# Patient Record
Sex: FEMALE | Race: BLACK OR AFRICAN AMERICAN | NOT HISPANIC OR LATINO | Employment: OTHER | URBAN - METROPOLITAN AREA
[De-identification: names, ages, dates, MRNs, and addresses within clinical notes are randomized per-mention and may not be internally consistent; named-entity substitution may affect disease eponyms.]

---

## 2017-01-06 ENCOUNTER — ANESTHESIA EVENT (OUTPATIENT)
Dept: SURGERY | Facility: HOSPITAL | Age: 82
DRG: 857 | End: 2017-01-06
Payer: MEDICARE

## 2017-01-06 ENCOUNTER — HOSPITAL ENCOUNTER (INPATIENT)
Facility: HOSPITAL | Age: 82
LOS: 3 days | Discharge: NURSING, REHAB OR LONG-TERM CARE | DRG: 857 | End: 2017-01-09
Attending: FAMILY MEDICINE | Admitting: FAMILY MEDICINE
Payer: MEDICARE

## 2017-01-06 DIAGNOSIS — S72.492A OTHER CLOSED FRACTURE OF DISTAL END OF LEFT FEMUR, INITIAL ENCOUNTER: ICD-10-CM

## 2017-01-06 DIAGNOSIS — T81.40XD POSTOPERATIVE INFECTION, SUBSEQUENT ENCOUNTER: ICD-10-CM

## 2017-01-06 DIAGNOSIS — I15.2 HYPERTENSION ASSOCIATED WITH DIABETES: ICD-10-CM

## 2017-01-06 DIAGNOSIS — T81.40XA POST OP INFECTION: Primary | ICD-10-CM

## 2017-01-06 DIAGNOSIS — N18.32 CHRONIC KIDNEY DISEASE (CKD) STAGE G3B/A1, MODERATELY DECREASED GLOMERULAR FILTRATION RATE (GFR) BETWEEN 30-44 ML/MIN/1.73 SQUARE METER AND ALBUMINURIA CREATININE RATIO LESS THAN 30 MG/G: ICD-10-CM

## 2017-01-06 DIAGNOSIS — T81.40XA POSTOPERATIVE INFECTION, INITIAL ENCOUNTER: ICD-10-CM

## 2017-01-06 DIAGNOSIS — I10 ESSENTIAL HYPERTENSION: ICD-10-CM

## 2017-01-06 DIAGNOSIS — I50.30 DIASTOLIC HEART FAILURE, UNSPECIFIED HEART FAILURE CHRONICITY: ICD-10-CM

## 2017-01-06 DIAGNOSIS — E11.59 HYPERTENSION ASSOCIATED WITH DIABETES: ICD-10-CM

## 2017-01-06 LAB
APTT BLDCRRT: 33.8 SEC
INR PPP: 1.1
PROTHROMBIN TIME: 11.7 SEC

## 2017-01-06 PROCEDURE — 85610 PROTHROMBIN TIME: CPT

## 2017-01-06 PROCEDURE — 11000001 HC ACUTE MED/SURG PRIVATE ROOM

## 2017-01-06 PROCEDURE — 85730 THROMBOPLASTIN TIME PARTIAL: CPT

## 2017-01-06 PROCEDURE — 25000003 PHARM REV CODE 250: Performed by: FAMILY MEDICINE

## 2017-01-06 RX ORDER — AMLODIPINE BESYLATE 5 MG/1
10 TABLET ORAL NIGHTLY
Status: DISCONTINUED | OUTPATIENT
Start: 2017-01-06 | End: 2017-01-09 | Stop reason: HOSPADM

## 2017-01-06 RX ORDER — IBUPROFEN 200 MG
24 TABLET ORAL
Status: DISCONTINUED | OUTPATIENT
Start: 2017-01-06 | End: 2017-01-09 | Stop reason: HOSPADM

## 2017-01-06 RX ORDER — PANTOPRAZOLE SODIUM 40 MG/1
40 TABLET, DELAYED RELEASE ORAL 2 TIMES DAILY
Status: DISCONTINUED | OUTPATIENT
Start: 2017-01-06 | End: 2017-01-09 | Stop reason: HOSPADM

## 2017-01-06 RX ORDER — CHOLECALCIFEROL (VITAMIN D3) 25 MCG
2000 TABLET ORAL DAILY
Status: DISCONTINUED | OUTPATIENT
Start: 2017-01-07 | End: 2017-01-09 | Stop reason: HOSPADM

## 2017-01-06 RX ORDER — MIRTAZAPINE 15 MG/1
15 TABLET, FILM COATED ORAL NIGHTLY
Status: DISCONTINUED | OUTPATIENT
Start: 2017-01-06 | End: 2017-01-09 | Stop reason: HOSPADM

## 2017-01-06 RX ORDER — ENOXAPARIN SODIUM 100 MG/ML
30 INJECTION SUBCUTANEOUS EVERY 24 HOURS
Status: DISCONTINUED | OUTPATIENT
Start: 2017-01-06 | End: 2017-01-09 | Stop reason: HOSPADM

## 2017-01-06 RX ORDER — FERROUS GLUCONATE 324(38)MG
325 TABLET ORAL
Status: DISCONTINUED | OUTPATIENT
Start: 2017-01-07 | End: 2017-01-09 | Stop reason: HOSPADM

## 2017-01-06 RX ORDER — ENOXAPARIN SODIUM 100 MG/ML
40 INJECTION SUBCUTANEOUS EVERY 24 HOURS
Status: DISCONTINUED | OUTPATIENT
Start: 2017-01-06 | End: 2017-01-06 | Stop reason: DRUGHIGH

## 2017-01-06 RX ORDER — ACETAMINOPHEN 325 MG/1
650 TABLET ORAL EVERY 8 HOURS PRN
Status: DISCONTINUED | OUTPATIENT
Start: 2017-01-06 | End: 2017-01-09 | Stop reason: HOSPADM

## 2017-01-06 RX ORDER — LUBIPROSTONE 24 UG/1
24 CAPSULE ORAL 2 TIMES DAILY WITH MEALS
Status: DISCONTINUED | OUTPATIENT
Start: 2017-01-06 | End: 2017-01-09 | Stop reason: HOSPADM

## 2017-01-06 RX ORDER — METOPROLOL TARTRATE 50 MG/1
50 TABLET ORAL 2 TIMES DAILY
Status: DISCONTINUED | OUTPATIENT
Start: 2017-01-06 | End: 2017-01-09 | Stop reason: HOSPADM

## 2017-01-06 RX ORDER — ALBUTEROL SULFATE 90 UG/1
2 AEROSOL, METERED RESPIRATORY (INHALATION) EVERY 4 HOURS PRN
Status: DISCONTINUED | OUTPATIENT
Start: 2017-01-06 | End: 2017-01-09 | Stop reason: HOSPADM

## 2017-01-06 RX ORDER — GLUCAGON 1 MG
1 KIT INJECTION
Status: DISCONTINUED | OUTPATIENT
Start: 2017-01-06 | End: 2017-01-09 | Stop reason: HOSPADM

## 2017-01-06 RX ORDER — IBUPROFEN 200 MG
16 TABLET ORAL
Status: DISCONTINUED | OUTPATIENT
Start: 2017-01-06 | End: 2017-01-09 | Stop reason: HOSPADM

## 2017-01-06 RX ORDER — ATORVASTATIN CALCIUM 40 MG/1
80 TABLET, FILM COATED ORAL DAILY
Status: DISCONTINUED | OUTPATIENT
Start: 2017-01-07 | End: 2017-01-09 | Stop reason: HOSPADM

## 2017-01-06 RX ORDER — CLOPIDOGREL BISULFATE 75 MG/1
75 TABLET ORAL DAILY
Status: DISCONTINUED | OUTPATIENT
Start: 2017-01-07 | End: 2017-01-09 | Stop reason: HOSPADM

## 2017-01-06 RX ORDER — SODIUM CHLORIDE 9 MG/ML
INJECTION, SOLUTION INTRAVENOUS CONTINUOUS
Status: ACTIVE | OUTPATIENT
Start: 2017-01-07 | End: 2017-01-07

## 2017-01-06 RX ORDER — AMOXICILLIN 250 MG
1 CAPSULE ORAL DAILY
Status: DISCONTINUED | OUTPATIENT
Start: 2017-01-07 | End: 2017-01-09 | Stop reason: HOSPADM

## 2017-01-06 RX ORDER — BENAZEPRIL HYDROCHLORIDE 20 MG/1
20 TABLET ORAL DAILY
Status: DISCONTINUED | OUTPATIENT
Start: 2017-01-07 | End: 2017-01-06

## 2017-01-06 RX ADMIN — PANTOPRAZOLE SODIUM 40 MG: 40 TABLET, DELAYED RELEASE ORAL at 08:01

## 2017-01-06 RX ADMIN — METOPROLOL TARTRATE 50 MG: 50 TABLET ORAL at 08:01

## 2017-01-06 RX ADMIN — MIRTAZAPINE 15 MG: 15 TABLET, FILM COATED ORAL at 08:01

## 2017-01-06 RX ADMIN — ACETAMINOPHEN 650 MG: 325 TABLET ORAL at 05:01

## 2017-01-06 NOTE — PROGRESS NOTES
01/06/17 1656   Vital Signs   Temp 98.6 °F (37 °C)   Temp src Oral   Pulse 69   Heart Rate Source Monitor   Resp 16   SpO2 99 %   O2 Device (Oxygen Therapy) room air   /67   BP Location Left arm   BP Method Automatic   Patient Position Lying       Patient is AAOx3, NAD. No complaints of nausea or vomiting.  Patient complains of pain in left leg.  Doctor was called to place orders.  Family is at the bedside.  Will continue to monitor.

## 2017-01-06 NOTE — PROGRESS NOTES
Estimated Creatinine Clearance: 27.1 mL/min (based on Cr of 1.6).  Lovenox 40 mg q24h renal dose adjusted to  Lovenox 30 mg q24h

## 2017-01-06 NOTE — H&P
[facility]  History & Physical    SUBJECTIVE:     Chief Complaint/Reason for Admission: Medical Management for Irrigation/Debridement    History of Present Illness:    Ms. Rosas is a 81 year old female with HTN, GERD, arthritis, Grade I diastolic heart failure, CKD Grade 3B who was transferred from [facility] to the floor for medical management and irrigation/debridement of wound infection s/p ORIF. Patient originally fell on 12/07 and received ORIF on 12/9. She was transferred to [place] on 12/12 and was subsequently found to have drainage, odor and erytehmia consistent with infection, was sent to [facility] on 12/26 and had an irrigation/debridement done at that time. Patient was found to have Staph, started on Vanc for six weeks and transferred to [facility] for long term antibiotics. After Ortho consult and evaluation, it was decided to admit the patient to the floor for washout/debridement in the morning. Patient reports pain in her left leg that is diffuse, non-radiating, constant and throbbing. It is somewhat relieved with Norco. Patient denies f/chills. No N/v/abdominal pain. Denies CP/SOB. No other complaints at this time.     Sees Dr. Dubois at the end of every month, but missed this past month due to LTAC placement.     PTA Medications   Medication Sig    albuterol 90 mcg/actuation inhaler Inhale 2 puffs into the lungs every 4 (four) hours as needed for Wheezing.    amlodipine (NORVASC) 10 MG tablet Take 10 mg by mouth every evening.    ascorbic acid, vitamin C, (VITAMIN C) 500 MG tablet Take 500 mg by mouth once daily.    atorvastatin (LIPITOR) 80 MG tablet Take 80 mg by mouth once daily.    benazepril (LOTENSIN) 20 MG tablet Take 20 mg by mouth once daily.    bisacodyl (DULCOLAX) 10 mg Supp Place 1 suppository (10 mg total) rectally daily as needed (Until bowel movement if patient has no bowel movement for 2 days).    clopidogrel (PLAVIX) 75 mg tablet Take 75 mg by mouth once daily.     ferrous gluconate (FERGON) 325 MG Tab Take 325 mg by mouth daily with breakfast.    hydrocodone-acetaminophen 5-325mg (NORCO) 5-325 mg per tablet Take 2 tablets by mouth every 4 (four) hours as needed.    lubiprostone (AMITIZA) 24 MCG Cap Take 24 mcg by mouth 2 (two) times daily with meals.    magnesium 200 mg Tab Take 400 mg by mouth 2 (two) times daily.    metoprolol tartrate (LOPRESSOR) 50 MG tablet Take 50 mg by mouth 2 (two) times daily.    mirtazapine (REMERON) 15 MG tablet Take 15 mg by mouth every evening.    pantoprazole (PROTONIX) 40 MG tablet Take 40 mg by mouth 2 (two) times daily.    prednisoLONE acetate (PRED FORTE) 1 % DrpS 1 drop 4 (four) times daily.    senna-docusate 8.6-50 mg (PERICOLACE) 8.6-50 mg per tablet Take 1 tablet by mouth once daily.    simethicone 180 mg Cap Take 180 mg by mouth 4 (four) times daily as needed (gas pain).    VANCOMYCIN HCL (VANCOMYCIN 1 G/250 ML D5W, READY TO MIX SYSTEM,) Inject 258.4 mLs (1,033.6 mg total) into the vein once daily.    vitamin D 1000 units Tab Take 370 mg by mouth once daily.       Review of patient's allergies indicates:   Allergen Reactions    Celebrex [celecoxib] Other (See Comments)     Pain down her spine       Past Medical History   Diagnosis Date    Arthritis     Elevated cholesterol     GERD (gastroesophageal reflux disease)     Hypertension      Past Surgical History   Procedure Laterality Date    Hysterectomy      Hernia repair      Blood clot removal       History reviewed. No pertinent family history.  Social History   Substance Use Topics    Smoking status: Never Smoker    Smokeless tobacco: None    Alcohol use No        Review of Systems:  Constitutional: no fever or chills  Eyes: no visual changes  ENT: no nasal congestion or sore throat  Respiratory: no cough or shortness of breath  Cardiovascular: no chest pain or palpitations  Gastrointestinal: no nausea or vomiting, no abdominal pain or change in bowel  habits  Genitourinary: no hematuria or dysuria  Musculoskeletal: no arthralgias or myalgias  Neurological: no seizures or tremors  Behavioral/Psych: no auditory or visual hallucinations  Endocrine: no heat or cold intolerance    OBJECTIVE:     Vital Signs (Most Recent):  Temp: 98.6 °F (37 °C) (01/06/17 1656)  Pulse: 69 (01/06/17 1656)  Resp: 16 (01/06/17 1656)  BP: 115/67 (01/06/17 1656)  SpO2: 99 % (01/06/17 1656)    Physical Exam:  General: well developed, well nourished  Eyes: conjunctivae/corneas clear. PERRL..  HENT: Head:normocephalic, atraumatic. Nose: Nares normal. Septum midline. Mucosa normal. No drainage or sinus tenderness., no discharge. Throat: lips, mucosa, and tongue normal; teeth and gums normal and no throat erythema.  Neck: supple, symmetrical, trachea midline, no JVD and thyroid not enlarged, symmetric, no tenderness/mass/nodules  Lungs:  clear to auscultation bilaterally and normal respiratory effort  Cardiovascular: Heart: regular rate and rhythm, S1, S2 normal, no murmur, click, rub or gallop. Chest Wall: no tenderness. Extremities: no cyanosis or edema, or clubbing. Pulses: 2+ and symmetric.  Abdomen/Rectal: Abdomen: soft, non-tender non-distented; bowel sounds normal; no masses,  no organomegaly. Rectal: normal tone, no masses or tenderness  Skin: Skin color, texture, turgor normal. No rashes or lesions  Musculoskeletal:no clubbing, cyanosis; Some pitting edema in bilateral LE's. Fixation in place over left lower extremity with dressings in place.   Neurologic: Normal strength and tone. No focal numbness or weakness  Psych/Behavioral:  Normal.    Laboratory:  CBC:   Recent Labs  Lab 01/06/17  0934   WBC 11.10   RBC 3.80*   HGB 11.1@RBC.*   HCT 36.0*   *   MCV 95   MCH 29.2   MCHC 30.8*     BMP:   Recent Labs  Lab 01/05/17  0650   GLU 72      K 4.7   *   CO2 25   BUN 45*   CREATININE 1.6*   CALCIUM 10.2   MG 1.7     CMP:   Recent Labs  Lab 01/05/17  0650   GLU 72   CALCIUM  10.2      K 4.7   CO2 25   *   BUN 45*   CREATININE 1.6*       Diagnostic Results:  No new imaging.     ASSESSMENT/PLAN:     Ms. Rosas is a 81 year old female with PMH HTN well-controlled on medications, grade I diastolic HF, CKD Grade 3B, GERD, and arthritis who presents for medical management for irrigation/debridement of wound infection s/p ORIF:    Wound Infection S/P ORIF  -NPO at midnight, hold Lovenox until after surgery  -Irrigation/debridement tomorrow around 12    HTN  -Continue Amlodipine.  -Hold Benazepril for SUMI risk during surgery.     Grade I Diastolic HF  -No systolic dysfunction, only minor diastolic failure  -Revised Cardiac Risk Index between 1-6% for major cardiac event based on patients history    CKD Grade 3B  -GFR has been stable around mid 30's placing her in grade 3B  -Cr<2.0, does not add to revised Cardiac Risk Index    GERD  -Continue PPI    Arthritis  -Tylenol PRN, patient non-ambulatory currently.     DVT Ppx: holding Lovenox for surgery tomorrow; not a candidate for SCD's due to fixation device.Start Lovenox after surgery.     Plan: monitor clinical stability and basic labs. F/U ortho recs s/p irrigation/debridement tomorrow.    1/6/2017 6:11 PM Kade Mejia M.D.

## 2017-01-06 NOTE — H&P
CC: L distal femur surgical site infection     HPI:  81yF w/ L distal femur fx s/p ORIF 12/9 complicated by a persistent surgical sit infection.  Underwent I&D on 12/26/16 followed by I&D, HWR, L knee spanning ex fix placement and abx bead placement on 12/27/16. Intraoperative cultures positive for MRSA.  She was placed on vancomycin and discharged to LTAC.  Over the past two days, she has experienced increasing purulent drainage from proximal extent of L thigh incision. Afebrile and vitals stable in LTAC.    PMH:        Past Medical History   Diagnosis Date    Arthritis      Elevated cholesterol      GERD (gastroesophageal reflux disease)      Hypertension           PSH:    has a past surgical history that includes Hysterectomy; Hernia repair; and blood clot removal.     SOCIAL:    reports that she has never smoked. She does not have any smokeless tobacco history on file. She reports that she does not drink alcohol or use illicit drugs.     MEDS:   No current facility-administered medications on file prior to encounter.              Current Outpatient Prescriptions on File Prior to Encounter   Medication Sig Dispense Refill    albuterol 90 mcg/actuation inhaler Inhale 2 puffs into the lungs every 4 (four) hours as needed for Wheezing.        amlodipine (NORVASC) 10 MG tablet Take 10 mg by mouth every evening.        atorvastatin (LIPITOR) 80 MG tablet Take 80 mg by mouth once daily.        benazepril (LOTENSIN) 20 MG tablet Take 20 mg by mouth once daily.        clopidogrel (PLAVIX) 75 mg tablet Take 75 mg by mouth once daily.        hydrocodone-acetaminophen 5-325mg (NORCO) 5-325 mg per tablet Take 2 tablets by mouth every 4 (four) hours as needed. 50 tablet 0    lubiprostone (AMITIZA) 24 MCG Cap Take 24 mcg by mouth 2 (two) times daily with meals.        metoprolol tartrate (LOPRESSOR) 50 MG tablet Take 50 mg by mouth 2 (two) times daily.        mirtazapine (REMERON) 15 MG tablet Take 15 mg by mouth  every evening.        pantoprazole (PROTONIX) 40 MG tablet Take 40 mg by mouth 2 (two) times daily.        simethicone 180 mg Cap Take 180 mg by mouth 4 (four) times daily as needed (gas pain).             ALLERGY:         Allergies as of 12/26/2016 - Braxton as Reviewed 12/26/2016   Allergen Reaction Noted    Celebrex [celecoxib] Other (See Comments) 12/08/2016         Temp:  [98.6 °F (37 °C)] 98.6 °F (37 °C)  Pulse:  [69] 69  Resp:  [16] 16  BP: (115)/(67) 115/67    PE:  NAD, A+Ox3  RRR  No increased WOB    LLE:  No significant erythema  Sutures in place  Purulent drainage saturating dressings  Purulent drainage expressible from proximal extent of wound  Ex fix in place, pin sites clean  Motor: + ehl, fhl, ta, gs  SILT: t/s/s/sp/dp  Bcr, wwp, 2+ DP    WBC 11.1  H/H 11.1/36  ESR >120  Cr 1.6  BUN 45  Vanc 19.3    Impression:  81yF w/ L distal femur surgical site infection    Plan:  - NPO/IVF at midnight  - To OR in morning, I&D, abx bead exchange  - Admitted to medicine, appreciate assistance with medical management  - Cont. IV vanc  - pain control  - NWB LLE     I agree with findings outlined by the resident. We will proceed with I&D, bead exchange today.

## 2017-01-06 NOTE — ANESTHESIA PREPROCEDURE EVALUATION
01/06/2017  Adilia Rosas is a 81 y.o., female for repeat I&D femur.    PRIOR ANES  2016-12-27 i/d leg and placement of abx spacer.  2016-12-26 I&D_Leg GA sevo&N2O NAAC   - IND: ent 75, prop 90; -90; MAINT: total phenylephrine 350   - A/W: easy mask, ETT 7.0 Mac#3,  Grade I, Atraumatic, 1 attempt  2019-12-09 ORIF_L_Femur GA Iso NAAC   - IND: fent 100, etom10+prop50, ->90; MAINT:  total phenylephrine 600   - A/W: no mask vent; ETT 7.5 to 21cm, Smith#2, 1 attempt atraumatic Grade I    Patient Active Problem List   Diagnosis    Closed fracture of distal end of left femur    Postoperative infection    Hypertension    Staphylococcus aureus infection    Hypomagnesemia    Hypophosphatemia    Anemia    Post op infection       Past Medical History   Diagnosis Date    Arthritis     Elevated cholesterol     GERD (gastroesophageal reflux disease)     Hypertension      *  CAD, MI 2006, medical management    Past Surgical History   Procedure Laterality Date    Hysterectomy      Hernia repair      Blood clot removal       Review of patient's allergies indicates:   Allergen Reactions    Celebrex [celecoxib] Other (See Comments)     Pain down her spine     ANES-RELATED MAR 2016-12-26  amlodipine pantoprazole pip-tazo 4.5g  benazepril  metoprolol  atorastatin  enoxaparin to srart 12/27      [facility] Anesthesia Evaluation    I have reviewed the Patient Summary Reports.    I have reviewed the Nursing Notes.   I have reviewed the Medications.     Review of Systems  Anesthesia Hx:  No problems with previous Anesthesia  Denies Family Hx of Anesthesia complications.   Denies Personal Hx of Anesthesia complications.   Social:  Non-Smoker, No Alcohol Use    Hematology/Oncology:     Oncology Normal   Hematology Comments: 2u prbc 1/5/16   EENT/Dental:EENT/Dental Normal   Cardiovascular:   Exercise tolerance:  good Past MI  ECG has been reviewed. Able to perform ADL and house work without chest pain ,dyspnea or dizziness   Pulmonary:   Asthma mild Reports no asthma attacks in several month-years   Renal/:   Chronic Renal Disease, CRI Baseline Cr. 1.7   Hepatic/GI:   GERD    Musculoskeletal:   Arthritis     Neurological:  Neurology Normal    Endocrine:  Endocrine Normal    Dermatological:  Skin Normal    Psych:  Psychiatric Normal         Wt Readings from Last 3 Encounters:   01/06/17 75 kg (165 lb 5.5 oz)   12/26/16 68.9 kg (152 lb)   12/08/16 72.9 kg (160 lb 11.5 oz)     Temp Readings from Last 3 Encounters:   01/06/17 37 °C (98.6 °F) (Oral)   12/29/16 36.7 °C (98.1 °F) (Oral)   12/12/16 37 °C (98.6 °F) (Oral)     BP Readings from Last 3 Encounters:   01/06/17 115/67   12/29/16 136/84   12/12/16 113/71     Pulse Readings from Last 3 Encounters:   01/06/17 69   12/29/16 81   12/12/16 98       Physical Exam  General:  Well nourished    Airway/Jaw/Neck:  Airway Findings: Mouth Opening: Normal Tongue: Normal  Mallampati: II  Improves to II with phonation.  TM Distance: Normal, at least 6 cm        Eyes/Ears/Nose:  EYES/EARS/NOSE FINDINGS: Normal   Dental:  Dental Findings:    Chest/Lungs:  Chest/Lungs Findings: Clear to auscultation, Normal Respiratory Rate     Heart/Vascular:  Heart Findings: Rate: Normal  Rhythm: Regular Rhythm  Heart Murmur  Systolic Heart Murmur Description: L Upper Sternal Border  Systolic Heart Murmur Grade: Grade III        Mental Status:  Mental Status Findings: Normal      Lab Results   Component Value Date    WBC 11.10 01/06/2017    HGB 11.1@RBC. (L) 01/06/2017    HCT 36.0 (L) 01/06/2017    MCV 95 01/06/2017     (H) 01/06/2017       Chemistry        Component Value Date/Time     01/05/2017 0650    K 4.7 01/05/2017 0650     (H) 01/05/2017 0650    CO2 25 01/05/2017 0650    BUN 45 (H) 01/05/2017 0650    CREATININE 1.6 (H) 01/05/2017 0650    GLU 72 01/05/2017 0650        Component  Value Date/Time    CALCIUM 10.2 01/05/2017 0650    ALKPHOS 94 12/29/2016 0557    AST 27 12/29/2016 0557    ALT <5 (L) 12/29/2016 0557    BILITOT 0.3 12/29/2016 0557        Lab Results   Component Value Date    ALBUMIN 1.6 (L) 12/29/2016     CXR 2016-12-07  Stable appearance of the chest a mild increase in interstitial lung markings.    EKG 2016-12-07  Sinus bradycardia with sinus arrhythmia  Possible Inferior infarct (cited on or before 11-MAY-2006)  Abnormal ECG  When compared with ECG of 19-SEP-2016 06:17,  Nonspecific T wave abnormality, improved in Anterior leads  Confirmed by Gus    ECHO 2016-09-19    1 - Mil d left atrial enlargement.     2 - Normal left ventricular systolic function (EF 55-60%).     3 - Normal right ventricular systolic function .     4 - The estimated PA systolic pressure is 38 mmHg.     5 - Trivial aortic regurgitation.     6 - Mild mitral regurgitation.     7 - Trivial tricuspid regurgitation.     8 - Grade 1 diastolic dysfunction .     Anesthesia Plan  Type of Anesthesia, risks & benefits discussed:  Anesthesia Type:  general  Patient's Preference:   Intra-op Monitoring Plan: arterial line and central line  Intra-op Monitoring Plan Comments:   Post Op Pain Control Plan:   Post Op Pain Control Plan Comments:   Induction:   IV  Beta Blocker:  Patient is on a Beta-Blocker and has received one dose within the past 24 hours (No further documentation required).       Informed Consent: Patient representative understands risks and agrees with Anesthesia plan.  Questions answered. Anesthesia consent signed with patient representative.  ASA Score: 3     Day of Surgery Review of History & Physical:            Ready For Surgery From Anesthesia Perspective.

## 2017-01-07 ENCOUNTER — ANESTHESIA (OUTPATIENT)
Dept: SURGERY | Facility: HOSPITAL | Age: 82
DRG: 857 | End: 2017-01-07
Payer: MEDICARE

## 2017-01-07 LAB
ALBUMIN SERPL BCP-MCNC: 1.7 G/DL
ALP SERPL-CCNC: 80 U/L
ALT SERPL W/O P-5'-P-CCNC: 10 U/L
ANION GAP SERPL CALC-SCNC: 5 MMOL/L
AST SERPL-CCNC: 26 U/L
BASOPHILS # BLD AUTO: 0.04 K/UL
BASOPHILS NFR BLD: 0.4 %
BILIRUB SERPL-MCNC: 0.4 MG/DL
BUN SERPL-MCNC: 37 MG/DL
CALCIUM SERPL-MCNC: 10.3 MG/DL
CHLORIDE SERPL-SCNC: 111 MMOL/L
CO2 SERPL-SCNC: 24 MMOL/L
CREAT SERPL-MCNC: 1.6 MG/DL
DIFFERENTIAL METHOD: ABNORMAL
EOSINOPHIL # BLD AUTO: 0.2 K/UL
EOSINOPHIL NFR BLD: 1.6 %
ERYTHROCYTE [DISTWIDTH] IN BLOOD BY AUTOMATED COUNT: 15.8 %
EST. GFR  (AFRICAN AMERICAN): 35 ML/MIN/1.73 M^2
EST. GFR  (NON AFRICAN AMERICAN): 30 ML/MIN/1.73 M^2
GLUCOSE SERPL-MCNC: 82 MG/DL
HCT VFR BLD AUTO: 32.7 %
HGB BLD-MCNC: 10.2 G/DL
LYMPHOCYTES # BLD AUTO: 1.4 K/UL
LYMPHOCYTES NFR BLD: 13.5 %
MAGNESIUM SERPL-MCNC: 1.8 MG/DL
MCH RBC QN AUTO: 29.4 PG
MCHC RBC AUTO-ENTMCNC: 31.2 %
MCV RBC AUTO: 94 FL
MONOCYTES # BLD AUTO: 0.7 K/UL
MONOCYTES NFR BLD: 7 %
NEUTROPHILS # BLD AUTO: 8.1 K/UL
NEUTROPHILS NFR BLD: 77.2 %
PHOSPHATE SERPL-MCNC: 3.1 MG/DL
PLATELET # BLD AUTO: 433 K/UL
PMV BLD AUTO: 9.8 FL
POCT GLUCOSE: 101 MG/DL (ref 70–110)
POTASSIUM SERPL-SCNC: 4.6 MMOL/L
PROT SERPL-MCNC: 5.7 G/DL
RBC # BLD AUTO: 3.47 M/UL
SODIUM SERPL-SCNC: 140 MMOL/L
VANCOMYCIN SERPL-MCNC: 25.9 UG/ML
WBC # BLD AUTO: 10.49 K/UL

## 2017-01-07 PROCEDURE — 25000003 PHARM REV CODE 250: Performed by: ANESTHESIOLOGY

## 2017-01-07 PROCEDURE — 3E0U029 INTRODUCTION OF OTHER ANTI-INFECTIVE INTO JOINTS, OPEN APPROACH: ICD-10-PCS | Performed by: ORTHOPAEDIC SURGERY

## 2017-01-07 PROCEDURE — 85025 COMPLETE CBC W/AUTO DIFF WBC: CPT

## 2017-01-07 PROCEDURE — 25000003 PHARM REV CODE 250: Performed by: STUDENT IN AN ORGANIZED HEALTH CARE EDUCATION/TRAINING PROGRAM

## 2017-01-07 PROCEDURE — 97161 PT EVAL LOW COMPLEX 20 MIN: CPT | Performed by: PHYSICAL THERAPIST

## 2017-01-07 PROCEDURE — 71000033 HC RECOVERY, INTIAL HOUR: Performed by: ORTHOPAEDIC SURGERY

## 2017-01-07 PROCEDURE — 97530 THERAPEUTIC ACTIVITIES: CPT | Performed by: PHYSICAL THERAPIST

## 2017-01-07 PROCEDURE — 11000001 HC ACUTE MED/SURG PRIVATE ROOM

## 2017-01-07 PROCEDURE — 80053 COMPREHEN METABOLIC PANEL: CPT

## 2017-01-07 PROCEDURE — 63600175 PHARM REV CODE 636 W HCPCS: Performed by: FAMILY MEDICINE

## 2017-01-07 PROCEDURE — 36000704 HC OR TIME LEV I 1ST 15 MIN: Performed by: ORTHOPAEDIC SURGERY

## 2017-01-07 PROCEDURE — 83735 ASSAY OF MAGNESIUM: CPT

## 2017-01-07 PROCEDURE — 63600175 PHARM REV CODE 636 W HCPCS: Performed by: STUDENT IN AN ORGANIZED HEALTH CARE EDUCATION/TRAINING PROGRAM

## 2017-01-07 PROCEDURE — 63600175 PHARM REV CODE 636 W HCPCS: Performed by: ANESTHESIOLOGY

## 2017-01-07 PROCEDURE — 36000705 HC OR TIME LEV I EA ADD 15 MIN: Performed by: ORTHOPAEDIC SURGERY

## 2017-01-07 PROCEDURE — 94761 N-INVAS EAR/PLS OXIMETRY MLT: CPT

## 2017-01-07 PROCEDURE — 25000003 PHARM REV CODE 250: Performed by: FAMILY MEDICINE

## 2017-01-07 PROCEDURE — 37000009 HC ANESTHESIA EA ADD 15 MINS: Performed by: ORTHOPAEDIC SURGERY

## 2017-01-07 PROCEDURE — G8980 MOBILITY D/C STATUS: HCPCS | Mod: CJ | Performed by: PHYSICAL THERAPIST

## 2017-01-07 PROCEDURE — G8979 MOBILITY GOAL STATUS: HCPCS | Mod: CK | Performed by: PHYSICAL THERAPIST

## 2017-01-07 PROCEDURE — 63600175 PHARM REV CODE 636 W HCPCS: Performed by: ORTHOPAEDIC SURGERY

## 2017-01-07 PROCEDURE — 37000008 HC ANESTHESIA 1ST 15 MINUTES: Performed by: ORTHOPAEDIC SURGERY

## 2017-01-07 PROCEDURE — C1713 ANCHOR/SCREW BN/BN,TIS/BN: HCPCS | Performed by: ORTHOPAEDIC SURGERY

## 2017-01-07 PROCEDURE — 0SPD08Z REMOVAL OF SPACER FROM LEFT KNEE JOINT, OPEN APPROACH: ICD-10-PCS | Performed by: ORTHOPAEDIC SURGERY

## 2017-01-07 PROCEDURE — 84100 ASSAY OF PHOSPHORUS: CPT

## 2017-01-07 PROCEDURE — 0JBM0ZZ EXCISION OF LEFT UPPER LEG SUBCUTANEOUS TISSUE AND FASCIA, OPEN APPROACH: ICD-10-PCS | Performed by: ORTHOPAEDIC SURGERY

## 2017-01-07 PROCEDURE — 80202 ASSAY OF VANCOMYCIN: CPT

## 2017-01-07 DEVICE — CEMENT BONE SIMPLE P INDIVID: Type: IMPLANTABLE DEVICE | Site: KNEE | Status: FUNCTIONAL

## 2017-01-07 RX ORDER — MEPERIDINE HYDROCHLORIDE 50 MG/ML
12.5 INJECTION INTRAMUSCULAR; INTRAVENOUS; SUBCUTANEOUS ONCE AS NEEDED
Status: ACTIVE | OUTPATIENT
Start: 2017-01-07 | End: 2017-01-07

## 2017-01-07 RX ORDER — HYDROMORPHONE HYDROCHLORIDE 2 MG/ML
0.2 INJECTION, SOLUTION INTRAMUSCULAR; INTRAVENOUS; SUBCUTANEOUS EVERY 5 MIN PRN
Status: DISCONTINUED | OUTPATIENT
Start: 2017-01-07 | End: 2017-01-09 | Stop reason: HOSPADM

## 2017-01-07 RX ORDER — OXYCODONE AND ACETAMINOPHEN 7.5; 325 MG/1; MG/1
1 TABLET ORAL EVERY 4 HOURS PRN
Status: DISCONTINUED | OUTPATIENT
Start: 2017-01-07 | End: 2017-01-09 | Stop reason: HOSPADM

## 2017-01-07 RX ORDER — FENTANYL CITRATE 50 UG/ML
INJECTION, SOLUTION INTRAMUSCULAR; INTRAVENOUS
Status: DISCONTINUED | OUTPATIENT
Start: 2017-01-07 | End: 2017-01-07

## 2017-01-07 RX ORDER — PROPOFOL 10 MG/ML
VIAL (ML) INTRAVENOUS
Status: DISCONTINUED | OUTPATIENT
Start: 2017-01-07 | End: 2017-01-07

## 2017-01-07 RX ORDER — HYDROCODONE BITARTRATE AND ACETAMINOPHEN 5; 325 MG/1; MG/1
1 TABLET ORAL
Status: DISCONTINUED | OUTPATIENT
Start: 2017-01-07 | End: 2017-01-09 | Stop reason: HOSPADM

## 2017-01-07 RX ORDER — OXYCODONE AND ACETAMINOPHEN 5; 325 MG/1; MG/1
1 TABLET ORAL EVERY 4 HOURS PRN
Status: DISCONTINUED | OUTPATIENT
Start: 2017-01-07 | End: 2017-01-09 | Stop reason: HOSPADM

## 2017-01-07 RX ORDER — DIPHENHYDRAMINE HYDROCHLORIDE 50 MG/ML
25 INJECTION INTRAMUSCULAR; INTRAVENOUS EVERY 6 HOURS PRN
Status: DISCONTINUED | OUTPATIENT
Start: 2017-01-07 | End: 2017-01-09 | Stop reason: HOSPADM

## 2017-01-07 RX ORDER — LIDOCAINE HYDROCHLORIDE 20 MG/ML
INJECTION, SOLUTION EPIDURAL; INFILTRATION; INTRACAUDAL; PERINEURAL
Status: DISCONTINUED | OUTPATIENT
Start: 2017-01-07 | End: 2017-01-07

## 2017-01-07 RX ORDER — MORPHINE SULFATE 2 MG/ML
4 INJECTION, SOLUTION INTRAMUSCULAR; INTRAVENOUS EVERY 4 HOURS PRN
Status: DISCONTINUED | OUTPATIENT
Start: 2017-01-07 | End: 2017-01-09 | Stop reason: HOSPADM

## 2017-01-07 RX ORDER — SUCCINYLCHOLINE CHLORIDE 20 MG/ML
INJECTION INTRAMUSCULAR; INTRAVENOUS
Status: DISCONTINUED | OUTPATIENT
Start: 2017-01-07 | End: 2017-01-07

## 2017-01-07 RX ORDER — SODIUM CHLORIDE 0.9 % (FLUSH) 0.9 %
3 SYRINGE (ML) INJECTION
Status: DISCONTINUED | OUTPATIENT
Start: 2017-01-07 | End: 2017-01-09 | Stop reason: HOSPADM

## 2017-01-07 RX ORDER — ONDANSETRON HYDROCHLORIDE 2 MG/ML
INJECTION, SOLUTION INTRAMUSCULAR; INTRAVENOUS
Status: DISCONTINUED | OUTPATIENT
Start: 2017-01-07 | End: 2017-01-07

## 2017-01-07 RX ORDER — SODIUM CHLORIDE 0.9 % (FLUSH) 0.9 %
3 SYRINGE (ML) INJECTION EVERY 8 HOURS
Status: DISCONTINUED | OUTPATIENT
Start: 2017-01-07 | End: 2017-01-09 | Stop reason: HOSPADM

## 2017-01-07 RX ORDER — VANCOMYCIN HYDROCHLORIDE 1 G/20ML
INJECTION, POWDER, LYOPHILIZED, FOR SOLUTION INTRAVENOUS
Status: DISCONTINUED | OUTPATIENT
Start: 2017-01-07 | End: 2017-01-07 | Stop reason: HOSPADM

## 2017-01-07 RX ORDER — ROCURONIUM BROMIDE 10 MG/ML
INJECTION, SOLUTION INTRAVENOUS
Status: DISCONTINUED | OUTPATIENT
Start: 2017-01-07 | End: 2017-01-07

## 2017-01-07 RX ORDER — GLYCOPYRROLATE 0.2 MG/ML
INJECTION INTRAMUSCULAR; INTRAVENOUS
Status: DISCONTINUED | OUTPATIENT
Start: 2017-01-07 | End: 2017-01-07

## 2017-01-07 RX ORDER — TOBRAMYCIN 1.2 G/30ML
INJECTION, POWDER, LYOPHILIZED, FOR SOLUTION INTRAVENOUS
Status: DISCONTINUED | OUTPATIENT
Start: 2017-01-07 | End: 2017-01-07 | Stop reason: HOSPADM

## 2017-01-07 RX ORDER — PHENYLEPHRINE HYDROCHLORIDE 10 MG/ML
INJECTION INTRAVENOUS
Status: DISCONTINUED | OUTPATIENT
Start: 2017-01-07 | End: 2017-01-07

## 2017-01-07 RX ADMIN — MORPHINE SULFATE 4 MG: 2 INJECTION, SOLUTION INTRAMUSCULAR; INTRAVENOUS at 03:01

## 2017-01-07 RX ADMIN — PANTOPRAZOLE SODIUM 40 MG: 40 TABLET, DELAYED RELEASE ORAL at 01:01

## 2017-01-07 RX ADMIN — FENTANYL CITRATE 25 MCG: 50 INJECTION, SOLUTION INTRAMUSCULAR; INTRAVENOUS at 10:01

## 2017-01-07 RX ADMIN — PANTOPRAZOLE SODIUM 40 MG: 40 TABLET, DELAYED RELEASE ORAL at 08:01

## 2017-01-07 RX ADMIN — FENTANYL CITRATE 50 MCG: 50 INJECTION, SOLUTION INTRAMUSCULAR; INTRAVENOUS at 09:01

## 2017-01-07 RX ADMIN — VANCOMYCIN HYDROCHLORIDE 1 G: 1 INJECTION, POWDER, LYOPHILIZED, FOR SOLUTION INTRAVENOUS at 09:01

## 2017-01-07 RX ADMIN — OXYCODONE AND ACETAMINOPHEN 1 TABLET: 7.5; 325 TABLET ORAL at 01:01

## 2017-01-07 RX ADMIN — HYDROMORPHONE HYDROCHLORIDE 0.2 MG: 2 INJECTION, SOLUTION INTRAMUSCULAR; INTRAVENOUS; SUBCUTANEOUS at 12:01

## 2017-01-07 RX ADMIN — MIRTAZAPINE 15 MG: 15 TABLET, FILM COATED ORAL at 08:01

## 2017-01-07 RX ADMIN — LIDOCAINE HYDROCHLORIDE 100 MG: 20 INJECTION, SOLUTION EPIDURAL; INFILTRATION; INTRACAUDAL; PERINEURAL at 09:01

## 2017-01-07 RX ADMIN — PROPOFOL 100 MG: 10 INJECTION, EMULSION INTRAVENOUS at 09:01

## 2017-01-07 RX ADMIN — PHENYLEPHRINE HYDROCHLORIDE 50 MCG: 10 INJECTION INTRAVENOUS at 10:01

## 2017-01-07 RX ADMIN — SODIUM CHLORIDE, PRESERVATIVE FREE 3 ML: 5 INJECTION INTRAVENOUS at 10:01

## 2017-01-07 RX ADMIN — PHENYLEPHRINE HYDROCHLORIDE 50 MCG: 10 INJECTION INTRAVENOUS at 11:01

## 2017-01-07 RX ADMIN — ATORVASTATIN CALCIUM 80 MG: 40 TABLET, FILM COATED ORAL at 01:01

## 2017-01-07 RX ADMIN — ONDANSETRON 4 MG: 2 INJECTION, SOLUTION INTRAMUSCULAR; INTRAVENOUS at 10:01

## 2017-01-07 RX ADMIN — STANDARDIZED SENNA CONCENTRATE AND DOCUSATE SODIUM 1 TABLET: 8.6; 5 TABLET, FILM COATED ORAL at 01:01

## 2017-01-07 RX ADMIN — ENOXAPARIN SODIUM 30 MG: 40 INJECTION, SOLUTION INTRAVENOUS; SUBCUTANEOUS at 01:01

## 2017-01-07 RX ADMIN — ROCURONIUM BROMIDE 10 MG: 10 INJECTION, SOLUTION INTRAVENOUS at 09:01

## 2017-01-07 RX ADMIN — FENTANYL CITRATE 25 MCG: 50 INJECTION, SOLUTION INTRAMUSCULAR; INTRAVENOUS at 11:01

## 2017-01-07 RX ADMIN — GLYCOPYRROLATE 0.8 MG: 0.2 INJECTION, SOLUTION INTRAMUSCULAR; INTRAVENOUS at 10:01

## 2017-01-07 RX ADMIN — SODIUM CHLORIDE, PRESERVATIVE FREE 3 ML: 5 INJECTION INTRAVENOUS at 02:01

## 2017-01-07 RX ADMIN — VITAMIN D, TAB 1000IU (100/BT) 2000 UNITS: 25 TAB at 01:01

## 2017-01-07 RX ADMIN — SUCCINYLCHOLINE CHLORIDE 100 MG: 20 INJECTION, SOLUTION INTRAMUSCULAR; INTRAVENOUS at 09:01

## 2017-01-07 RX ADMIN — METOPROLOL TARTRATE 50 MG: 50 TABLET ORAL at 01:01

## 2017-01-07 RX ADMIN — SODIUM CHLORIDE: 0.9 INJECTION, SOLUTION INTRAVENOUS at 12:01

## 2017-01-07 RX ADMIN — SODIUM CHLORIDE: 0.9 INJECTION, SOLUTION INTRAVENOUS at 09:01

## 2017-01-07 NOTE — ANESTHESIA RELEASE NOTE
"Anesthesia Release from PACU Note    Patient: Adilia Rosas    Procedure(s) Performed: Procedure(s) (LRB):  IRRIGATION AND DEBRIDEMENT LOWER EXTREMITY (Left)  PLACEMENT SPACER-ANTIBIOTIC (Left)    Anesthesia type: general    Post pain: Adequate analgesia    Post assessment: no apparent anesthetic complications, tolerated procedure well and no evidence of recall    Last Vitals:   Visit Vitals    /70 (BP Location: Left arm, Patient Position: Lying, BP Method: Automatic)    Pulse 71    Temp 36.4 °C (97.5 °F) (Oral)    Resp 16    Ht 5' 2" (1.575 m)    Wt 75 kg (165 lb 5.5 oz)    LMP  (LMP Unknown)    SpO2 97%    Breastfeeding No    BMI 30.24 kg/m2       Post vital signs: stable    Level of consciousness: awake, alert  and oriented    Nausea/Vomiting: no nausea/no vomiting    Complications: none    Airway Patency: patent    Respiratory: unassisted    Cardiovascular: stable and blood pressure at baseline    Hydration: euvolemic  "

## 2017-01-07 NOTE — PLAN OF CARE
"VSS. Denies pain or discomfort @ this time. "Ok to release to room", per Dr Cornelius. REport called to pt's nurse Bria Portillo RN, with time allotted for questions.  "

## 2017-01-07 NOTE — PLAN OF CARE
01/07/17 1637   Readmission Questionnaire   At the time of your discharge, did someone talk to you about what your health problems were? Yes   At the time of discharge, did someone talk to you about what to watch out for regarding worsening of your health problem? Yes   At the time of discharge, did someone talk to you about what to do if you experienced worsening of your health problem? Yes   At the time of discharge, did someone talk to you about when and where to follow up with a doctor after you left the hospital? Yes   What do you believe caused you to be sick enough to be re-admitted? post op wound infection   How often do you need to have someone help you when you read instructions, pamphlets, or other written material from your doctor or pharmacy? Often   Do you have problems taking your medications as prescribed? No   Do you have any problems affording any of  your prescribed medications? No   Do you have problems obtaining/receiving your medications? No   Does the patient have transportation to healthcare appointments? Yes   Lives With child(francy), adult   Living Arrangements house   Does the patient have family/friends to help with healtcare needs after discharge? yes   Does your caregiver provide all the help you need? Yes   Are you currently feeling confused? No   Are you currently having problems thinking? No   Are you currently having memory problems? No   Have you felt down, depressed, or hopeless? 0   Have you felt little interest or pleasure in doing things? 0   In the last 7 days, my sleep quality was: jordi Alvarez RN Transitional Navigator  [phone]

## 2017-01-07 NOTE — BRIEF OP NOTE
Brief Operative Note    Adilia Rossa    1/7/2016    Pre-op Diagnosis: L distal femur fracture complicated by surgical site infection       Post-op Diagnosis: L distal femur fracture complicated by surgical site infection    Procedure(s):   Irrigation and debridement with antibiotic bead exchange      Anesthesia: General    Surgeon(s):   MD Quentin Ruelas MD    Staff Attending:  Jose Antonio Franklin MD    Estimated Blood Loss: 25c           Drain: None            Total IV Fluids: 300 mL crystalloid           Specimens: None    Implants: 20 antibiotic impregnated cement beads    Complications: none    Findings: small quantity of purulent material distally, internal dehiscence           Disposition: awakened from anesthesia, extubated and taken to the recovery room in a stable condition, having suffered no apparent untoward event.           Condition: doing well without problems      Technique: See operative report    I agree with findings outlined by the resident.

## 2017-01-07 NOTE — TRANSFER OF CARE
"Anesthesia Transfer of Care Note    Patient: Adilia Rosas    Procedure(s) Performed: Procedure(s) (LRB):  IRRIGATION AND DEBRIDEMENT LOWER EXTREMITY (Left)  PLACEMENT SPACER-ANTIBIOTIC (Left)    Patient location: OPS    Anesthesia Type: general    Transport from OR: Transported from OR on 6-10 L/min O2 by face mask with adequate spontaneous ventilation    Post pain: adequate analgesia    Post assessment: no apparent anesthetic complications    Post vital signs: stable    Level of consciousness: awake, alert and oriented    Nausea/Vomiting: no nausea/vomiting    Complications: none          Last vitals:   Visit Vitals    /74    Pulse 82    Temp 36.6 °C (97.9 °F) (Oral)    Resp (!) 24    Ht 5' 2" (1.575 m)    Wt 75 kg (165 lb 5.5 oz)    LMP  (LMP Unknown)    SpO2 99%    Breastfeeding No    BMI 30.24 kg/m2     "

## 2017-01-07 NOTE — H&P
The patient has been examined and the H&P has been reviewed:  I concur with the findings and no changes have occurred since H&P was written.      Anesthesia/Surgery risks, benefits and alternative options discussed and understood by patient/family.    I agree with findings outlined by the resident.

## 2017-01-07 NOTE — OP NOTE
"1/7/2016     Orthopaedic Surgery Service      Attending Physician:   Jose Antonio Franklin MD      Assistants:   Quentin Kay MD      Pre-Op Diagnosis:   Wound Infection After ORIF Left Distal Femur Fracture     Post-Op Diagnosis:   Same      Procedure:   Irrigation and Debridement Left Thigh Wound  Antibiotic bead exchange     Anesthesia:   GETA      Implants:   1antibiotic bead strings - 20 beads     Estimated Blood Loss:   25cc     IVF:  300mL NS     Complications:   None      Indications:   The patient has a wound infection after ORIF of a distal femur fracture on 12/9/16. She had an irrigation and debridement 12/26, as well as an I&D with HWR, L knee spanning ex fix application and antibiotic bead placement. Over the past several days, increasing purulent drainage was noted from the wound despite IV abx therapy.  The patient has been indicated for repeat I&D of L thigh surgical site as well as antibiotic bead exchange. The risks, benefits, alternatives, were discussed with the patient and family who agreed to proceed.     Procedure Details:   The patient was taken to the operating room, identified as Adilia Rosas and the procedure verified. Following the successful induction of anesthesia the patient was placed supine on the standard OR table. Sutures were removed. The left limb was prepped and draped in the usual sterile fashion. A "time out" was held and the above information confirmed.    The incision was re-opened and the wound was explored. Internal dehiscence of the layered closure from the prior procedure was noted. The 2 antibiotic bead strings were identified and removed, all 23 antibiotic-impregnated cement beads were accounted for and removed.  A small quantity of purulent material was noted in the distal extent of the wound. There was a small about of necrotic-appearing tissue present in the wound.  No large collection of purulent fluid was identified. The lateral arthrotomy to the knee was noted and " explored, no purulent material in the knee joint was appreciated. A large cavitary defect was noted in the distal femoral fracture site.  The fracture site was thoroughly cleaned, no obvious infection of the bone was appreciable. Necrotic-appearing tissue was debrided from the site with the use of curettes and sharply with a 10 blade scalpel. The wound was then thoroughly irrigated with 3L of normal saline, including the L knee joint.  Antibiotic-impregnated beads were prepared on the back table using 3g of vancomycin as well as 3.6g of tobramycin.  20 beads were placed on a single PDS string which was then placed in the cleaned wound.    Next, layered closure of the wound was performed as follows: number 1 PDS closure of the IT band and capsular remnants, 2-0 PDS closure of the deep subcutanteous fat layer, 2-0 monocril closure for subcutaneous closure, and 2-0 nylon closure of the skin in interrupted, vertical mattress fashion.  The wound was dressed with xeroform, 4x4 gauze, ABD pads and metapore tape.  Pin sites were dressed with xeroform, 4x4 gauze, and kerlex.    Instrument, sponge, and needle counts were correct prior to wound closure and at the conclusion of the case.     Plan:  She will return to the floor to continue IV abx therapy. Wound will be careful monitored for increasing drainage.

## 2017-01-07 NOTE — PLAN OF CARE
Problem: Patient Care Overview  Goal: Plan of Care Review  Outcome: Ongoing (interventions implemented as appropriate)  No respiratory distress noted at this time. Will continue to monitor pt.

## 2017-01-07 NOTE — PLAN OF CARE
Problem: Patient Care Overview  Goal: Plan of Care Review  Outcome: Ongoing (interventions implemented as appropriate)  Patient is AAOx3, NAD noted, no complaints of nausea, vomiting, or SOB.  PRN pain medications given per MAR.  External fixator on left leg still in place.  Patient went for I&D today.  Family at the bedside.  PT at the bedside now working with patient.  Medications taken without difficulty.  NS infusing at 50cc/hr.  Bed is in lowest position, wheels locked, call light within reach.  Safety has been maintained.  Will continue to monitor.

## 2017-01-07 NOTE — ANESTHESIA POSTPROCEDURE EVALUATION
"Anesthesia Post Evaluation    Patient: Adilai Rosas    Procedure(s) Performed: Procedure(s) (LRB):  IRRIGATION AND DEBRIDEMENT LOWER EXTREMITY (Left)  PLACEMENT SPACER-ANTIBIOTIC (Left)    Final Anesthesia Type: general  Patient location during evaluation: PACU  Patient participation: Yes- Able to Participate  Level of consciousness: awake and alert  Post-procedure vital signs: reviewed and stable  Pain management: adequate  Airway patency: patent  PONV status at discharge: No PONV  Anesthetic complications: no      Cardiovascular status: hemodynamically stable  Respiratory status: unassisted  Hydration status: euvolemic  Follow-up not needed.        Visit Vitals    /70 (BP Location: Left arm, Patient Position: Lying, BP Method: Automatic)    Pulse 71    Temp 36.4 °C (97.5 °F) (Oral)    Resp 16    Ht 5' 2" (1.575 m)    Wt 75 kg (165 lb 5.5 oz)    LMP  (LMP Unknown)    SpO2 97%    Breastfeeding No    BMI 30.24 kg/m2       Pain/Kristie Score: Pain Assessment Performed: Yes (1/7/2017 12:25 PM)  Presence of Pain: denies (1/7/2017 12:25 PM)  Pain Rating Prior to Med Admin: 6 (1/7/2017  1:03 PM)  Kristie Score: 10 (1/7/2017 12:25 PM)      "

## 2017-01-07 NOTE — PROGRESS NOTES
Progress Note    Admit Date: 1/6/2017   LOS: 1 day     SUBJECTIVE:     NAEON. Patient currently in irrigation/debridement and abx bead exchange with ortho. Will follow-up with patient upon return.     Scheduled Meds:   amlodipine  10 mg Oral QHS    atorvastatin  80 mg Oral Daily    clopidogrel  75 mg Oral Daily    enoxaparin  30 mg Subcutaneous Daily    ferrous gluconate  324 mg Oral Daily with breakfast    lubiprostone  24 mcg Oral BID WM    metoprolol tartrate  50 mg Oral BID    mirtazapine  15 mg Oral QHS    pantoprazole  40 mg Oral BID    senna-docusate 8.6-50 mg  1 tablet Oral Daily    vancomycin 1 g in dextrose 5 % 250 mL IVPB (ready to mix system)  1 g Intravenous Daily    vitamin D  2,000 Units Oral Daily     Continuous Infusions:   sodium chloride 0.9% 50 mL/hr at 01/07/17 0045     PRN Meds:acetaminophen, albuterol, dextrose 50%, dextrose 50%, glucagon (human recombinant), glucose, glucose    Review of patient's allergies indicates:   Allergen Reactions    Celebrex [celecoxib] Other (See Comments)     Pain down her spine       OBJECTIVE:     Vital Signs (Most Recent)  Temp: 98.3 °F (36.8 °C) (01/07/17 0800)  Pulse: 65 (01/07/17 0800)  Resp: 16 (01/07/17 0800)  BP: 133/74 (01/07/17 0800)  SpO2: 95 % (01/07/17 0839)    Vital Signs Range (Last 24H):  Temp:  [97.6 °F (36.4 °C)-98.6 °F (37 °C)]   Pulse:  [62-83]   Resp:  [16-18]   BP: (111-139)/(61-76)   SpO2:  [95 %-99 %]     I & O (Last 24H):  Intake/Output Summary (Last 24 hours) at 01/07/17 0940  Last data filed at 01/07/17 0130   Gross per 24 hour   Intake                0 ml   Output              350 ml   Net             -350 ml     Physical Exam:  General: well developed, well nourished  Eyes: conjunctivae/corneas clear. PERRL..  HENT: Head:normocephalic, atraumatic. Nose: Nares normal. Septum midline. Mucosa normal. No drainage or sinus tenderness., no discharge. Throat: lips, mucosa, and tongue normal; teeth and gums normal and no throat  erythema.  Neck: supple, symmetrical, trachea midline, no JVD and thyroid not enlarged, symmetric, no tenderness/mass/nodules  Lungs: clear to auscultation bilaterally and normal respiratory effort  Cardiovascular: Heart: regular rate and rhythm, S1, S2 normal, no murmur, click, rub or gallop. Chest Wall: no tenderness. Extremities: no cyanosis or edema, or clubbing. Pulses: 2+ and symmetric.  Abdomen/Rectal: Abdomen: soft, non-tender non-distented; bowel sounds normal; no masses, no organomegaly. Rectal: normal tone, no masses or tenderness  Skin: Skin color, texture, turgor normal. No rashes or lesions  Musculoskeletal:no clubbing, cyanosis; Some pitting edema in bilateral LE's. Fixation in place over left lower extremity with dressings in place.   Neurologic: Normal strength and tone. No focal numbness or weakness  Psych/Behavioral: Normal.    Laboratory:  CBC:   Recent Labs  Lab 01/07/17  0602   WBC 10.49   RBC 3.47*   HGB 10.2*   HCT 32.7*   *   MCV 94   MCH 29.4   MCHC 31.2*     BMP:   Recent Labs  Lab 01/07/17  0602   GLU 82      K 4.6   *   CO2 24   BUN 37*   CREATININE 1.6*   CALCIUM 10.3   MG 1.8     CMP:   Recent Labs  Lab 01/07/17  0602   GLU 82   CALCIUM 10.3   ALBUMIN 1.7*   PROT 5.7*      K 4.6   CO2 24   *   BUN 37*   CREATININE 1.6*   ALKPHOS 80   ALT 10   AST 26   BILITOT 0.4     LFTs:   Recent Labs  Lab 01/07/17  0602   ALT 10   AST 26   ALKPHOS 80   BILITOT 0.4   PROT 5.7*   ALBUMIN 1.7*     Coagulation:   Recent Labs  Lab 01/06/17  1925   INR 1.1   APTT 33.8*         Diagnostic Results:  No new images.     ASSESSMENT/PLAN:     Ms. Rosas is a 81 year old female with PMH HTN well-controlled on medications, grade I diastolic HF, CKD Grade 3B, GERD, and arthritis who presents for medical management for irrigation/debridement of wound infection s/p ORIF:     Wound Infection S/P ORIF  -Irrigation/debridement with abx bead replacement currently.  -F/U with patient  afterward.      HTN  -Continue Amlodipine.  -Hold Benazepril for SUMI risk during surgery.      Grade I Diastolic HF  -No systolic dysfunction, only minor diastolic failure  -Revised Cardiac Risk Index between 1-6% for major cardiac event based on patients history     CKD Grade 3B  -GFR has been stable around mid 30's placing her in grade 3B  -Cr<2.0, does not add to revised Cardiac Risk Index     GERD  -Continue PPI     Arthritis  -Tylenol PRN, patient non-ambulatory currently.      DVT Ppx: holding Lovenox for surgery tomorrow; not a candidate for SCD's due to fixation device.Start Lovenox after surgery.      Plan: Labs stable, patient in surgery. F/U with patient s/p irrigation/debridement and abx bead replacement.     1/7/2017 9:48 AM Kade Mejia M.D.

## 2017-01-07 NOTE — PLAN OF CARE
Problem: Physical Therapy Goal  Goal: Physical Therapy Goal  1. Pt. Supine to sit with Mod I  2. Pt. Bed to/from w/c with NWB LLE with CG  3. Tolerate Sitting 1 1/2 hours  Outcome: Ongoing (interventions implemented as appropriate)  Pt would benefit from PT to improve functional mobility.

## 2017-01-07 NOTE — PLAN OF CARE
01/07/17 1631   Discharge Assessment   Assessment Type Discharge Planning Assessment   Assessment information obtained from? Medical Record   Prior to hospitilization cognitive status: Alert/Oriented   Prior to hospitalization functional status: Assistive Equipment   Current cognitive status: Unable to Assess  (pt out of rm for debridement of surgical wound)   Current Functional Status: Completely Dependent   Arrived From skilled nursing facility   Lives With child(francy), adult   Able to Return to Prior Arrangements unable to determine at this time (comments)   Is patient able to care for self after discharge? Unable to determine at this time (comments)   Does the patient have family/friends to help with healtcare needs after discharge? yes   How many people do you have in your home that can help with your care after discharge? 1   Patient's perception of discharge disposition skilled nursing facility   Readmission Within The Last 30 Days other (see comments)  (return from SNF with post op infection)   Patient currently being followed by outpatient case management? No   Patient currently receives home health services? No   Does the patient currently use HME? Yes   Patient currently receives private duty nursing? No   Patient currently receives any other outside agency services? No   Equipment Currently Used at Home walker, rolling;bedside commode   Do you have any problems affording any of your prescribed medications? No   Is the patient taking medications as prescribed? yes   Do you have any financial concerns preventing you from receiving the healthcare you need? No   Does the patient have transportation to healthcare appointments? Yes   Transportation Available family or friend will provide   On Dialysis? No   Does the patient receive services at the [facility]mad[facility]? No   Are there any open cases? No   Discharge Plan A Skilled Nursing Facility   Discharge Plan B Home Health   Patient/Family In Agreement With  Plan yes     Iqra Alvarez RN Transitional Navigator  [phone]

## 2017-01-07 NOTE — PLAN OF CARE
Problem: Patient Care Overview  Goal: Plan of Care Review  Outcome: Ongoing (interventions implemented as appropriate)  RA ST 97%; no distress noted.

## 2017-01-07 NOTE — PROGRESS NOTES
Interval:   NAEON.  Pain controlled.  NPO overnight.  Ready for surgery.  All questions addressed.    Vitals:  Temp:  [97.6 °F (36.4 °C)-98.6 °F (37 °C)] 97.9 °F (36.6 °C)  Pulse:  [62-83] 70  Resp:  [13-24] 13  BP: (109-139)/(60-76) 114/68    Scheduled Meds:    amlodipine  10 mg Oral QHS    atorvastatin  80 mg Oral Daily    clopidogrel  75 mg Oral Daily    enoxaparin  30 mg Subcutaneous Daily    ferrous gluconate  324 mg Oral Daily with breakfast    lubiprostone  24 mcg Oral BID WM    metoprolol tartrate  50 mg Oral BID    mirtazapine  15 mg Oral QHS    pantoprazole  40 mg Oral BID    senna-docusate 8.6-50 mg  1 tablet Oral Daily    sodium chloride 0.9%  3 mL Intravenous Q8H    vancomycin 1 g in dextrose 5 % 250 mL IVPB (ready to mix system)  1 g Intravenous Daily    vitamin D  2,000 Units Oral Daily     Continuous Infusions:    sodium chloride 0.9% 50 mL/hr at 01/07/17 0045     PRN Meds: acetaminophen, albuterol, dextrose 50%, dextrose 50%, diphenhydrAMINE, glucagon (human recombinant), glucose, glucose, hydrocodone-acetaminophen 5-325mg, HYDROmorphone, HYDROmorphone, meperidine, morphine, oxycodone-acetaminophen, oxycodone-acetaminophen, promethazine (PHENERGAN) IVPB, sodium chloride 0.9%    Diet: Diet Adult Regular    Trended Lab Data:    Recent Labs  Lab 01/02/17  0512 01/05/17  0650 01/06/17  0934 01/07/17  0602   WBC 10.16 9.89 11.10 10.49   HGB 8.1* 6.9* 11.1@RBC.* 10.2*   HCT 27.0* 22.7* 36.0* 32.7*    387* 363* 433*   MCV 94 95 95 94   RDW 16.6* 16.4* 16.2* 15.8*    140  --  140   K 5.4* 4.7  --  4.6   * 111*  --  111*   CO2 22* 25  --  24   BUN 51* 45*  --  37*   CREATININE 1.6* 1.6*  --  1.6*   GLU 87 72  --  82   PROT  --   --   --  5.7*   ALBUMIN  --   --   --  1.7*   BILITOT  --   --   --  0.4   AST  --   --   --  26   ALKPHOS  --   --   --  80   ALT  --   --   --  10       I/O last 3 completed shifts:  In: -   Out: 350 [Urine:350]    Exam:  NAD, A+Ox3  RRR  No  increased WOB    LLE:  Ex fix in place  Dressings, saturated with purulent fluid  Motor: + ehl, fhl, ta, gs  SILT: t/s/s/sp/dp  2+ DP/PT, bcr, wwp    Impression:  81yF with SSI after ORIF of L distal femur fracture.    Plan:  - To OR today for I&D, antibiotic bead exchange  - Pain control  - IV vanc  DVT/VTE prophylaxis: lonovex  Weight Bearing Status: NWB LLE  Dispo: continue inpatient care through weekend, will need to return to LTAC sometime next week

## 2017-01-07 NOTE — PROGRESS NOTES
[facility]  Post-op Note    Pain controlled.  Denies numbness/paresthesias.    Vitals:    01/07/17 1225   BP: 114/68   Pulse: 70   Resp: 13   Temp:      NAD, A+Ox3  RRR  No increased WOB    LLE:  Ex fix in place  Dressings c/d/i  Motor: + ehl, fhl, ta, gs  SILT: t/s/s/sp/dp  2+ DP/PT, bcr, wwp    A/P:  81yF with L distal femur fracture complicated by SSI  - pain control  - regular diet  - IV vanc, dosage per primary  - defer medical management to primary team  - NWB LLE, PT/OT  - DVT ppx - lovenox

## 2017-01-07 NOTE — PT/OT/SLP EVAL
Physical Therapy  Evaluation    Adilia Rosas   MRN: [id number]   Admitting Diagnosis: Post op infection    PT Received On: 17  PT Start Time: 1450     PT Stop Time: 1520    PT Total Time (min): 30 min       Billable Minutes:  Evaluation 15 and Therapeutic Activity 15    Diagnosis: Post op infection  The primary encounter diagnosis was Post op infection. A diagnosis of Other closed fracture of distal end of left femur, initial encounter was also pertinent to this visit.      Past Medical History   Diagnosis Date    Arthritis     Elevated cholesterol     GERD (gastroesophageal reflux disease)     Hypertension       Past Surgical History   Procedure Laterality Date    Hysterectomy      Hernia repair      Blood clot removal         Referring physician: Dr. Mustafa  Date referred to PT: 2017      General Precautions: Standard, fall  Orthopedic Precautions: LLE non weight bearing   Braces:         Do you have any cultural, spiritual, Uatsdin conflicts, given your current situation?: none    Patient History:  Lives With: child(francy), adult  Living Arrangements: house  Home Layout: Able to live on 1st floor  Transportation Available: family or friend will provide  Equipment Currently Used at Home: bedside commode, walker, rolling  DME owned (not currently used): rolling walker, standard walker, bedside commode and wheelchair    Previous Level of Function:  Ambulation Skills: needs device  Transfer Skills: needs device  ADL Skills: needs device  Work/Leisure Activity: needs device    Subjective:  Communicated with nurse prior to session.  Family in room   Chief Complaint: Just had surgery  Patient goals: spend 3 days here then go to LTAC    Pain Ratin/10      Location - Orientation:  (LLE)     Pain Addressed: Pre-medicate for activity, Reposition, Distraction, Nurse notified  Pain Rating Post-Intervention: 5/10    Objective:   Patient found with: peripheral IV     Cognitive Exam:  Oriented to: Person,  "Place, Time and Situation    Follows Commands/attention: Follows two-step commands  Communication: clear/fluent  Safety awareness/insight to disability: intact    Physical Exam:  Postural examination/scapula alignment: Rounded shoulder, Head forward and Posterior pelvic tilt    Skin integrity: Visible skin intact  Edema: Mild Left foot    Sensation:   Intact    Upper Extremity Range of Motion:  Right Upper Extremity: WFL  Left Upper Extremity: WFL    Upper Extremity Strength:  Right Upper Extremity: WFL  Left Upper Extremity: WFL    Lower Extremity Range of Motion:  Right Lower Extremity: WFL  Left Lower Extremity: NT    Lower Extremity Strength:  Right Lower Extremity: WFL except 3+/5 at hip  Left Lower Extremity: Not able to assess     Fine motor coordination:  Intact    Gross motor coordination: WFL    Functional Mobility:  Bed Mobility:  Rolling/Turning to Left: Minimum assistance (for LLE)  Rolling/Turning Right: Minimum assistance (for LLE)  Scooting/Bridging: Minimum Assistance (for LLE)  Supine to Sit: Minimum Assistance (for LLE)  Sit to Supine: Minimum Assistance (for LLE)    Transfers:       Gait:        Stairs:  NT    Balance:   Static Sit: GOOD-: Takes MODERATE challenges from all directions but inconsistently  Dynamic Sit: FAIR+: Maintains balance through MINIMAL excursions of active trunk motion    Therapeutic Activities and Exercises:  Pt sat at EOB 14 min at scooted 15" to HOB with Supervision.    AM-PAC 6 CLICK MOBILITY  How much help from another person does this patient currently need?   1 = Unable, Total/Dependent Assistance  2 = A lot, Maximum/Moderate Assistance  3 = A little, Minimum/Contact Guard/Supervision  4 = None, Modified [place]/Independent    Turning over in bed (including adjusting bedclothes, sheets and blankets)?: 3  Sitting down on and standing up from a chair with arms (e.g., wheelchair, bedside commode, etc.): 3  Moving from lying on back to sitting on the side of the " bed?: 3  Moving to and from a bed to a chair (including a wheelchair)?: 3  Need to walk in hospital room?: 2  Climbing 3-5 steps with a railing?: 2  Total Score: 16     AM-PAC Raw Score CMS G-Code Modifier Level of Impairment Assistance   6 % Total / Unable   7 - 9 CM 80 - 100% Maximal Assist   10 - 14 CL 60 - 80% Moderate Assist   15 - 19 CK 40 - 60% Moderate Assist   20 - 22 CJ 20 - 40% Minimal Assist   23 CI 1-20% SBA / CGA   24 CH 0% Independent/ Mod I     Patient left HOB elevated with all lines intact, call button in reach, bed alarm on, nurse notified and family present.    Assessment:   Adilia Rosas is a 81 y.o. female with a medical diagnosis of Post op infection.    Rehab identified problem list/impairments: Rehab identified problem list/impairments: weakness, gait instability, decreased ROM, impaired endurance, impaired balance, decreased lower extremity function, impaired self care skills, pain, impaired functional mobilty, decreased coordination    Rehab potential is fair.    Activity tolerance: Fair    Discharge recommendations: Discharge Facility/Level Of Care Needs: LTACH (long term acute care hospital)     Barriers to discharge: Barriers to Discharge: None    Equipment recommendations:       GOALS:   Physical Therapy Goals        Problem: Physical Therapy Goal    Goal Priority Disciplines Outcome Goal Variances Interventions   Physical Therapy Goal     PT/OT, PT Ongoing (interventions implemented as appropriate)     Description:  1.  Pt. Supine to sit with Mod I  2.  Pt. Bed to/from w/c with NWB LLE with CG  3.  Tolerate Sitting 1 1/2 hours              PLAN:    Patient to be seen daily to address the above listed problems via gait training, therapeutic activities, therapeutic exercises, neuromuscular re-education  Plan of Care expires: 02/07/17  Plan of Care reviewed with: patient    Functional Assessment Tool Used: [facility]  Score: 16  Functional Limitation: Mobility: Walking and moving  around  Mobility: Walking and Moving Around Goal Status (): CK  Mobility: Walking and Moving Around Discharge Status (): XAVIER Rose, PT  01/07/2017

## 2017-01-07 NOTE — PROGRESS NOTES
01/07/17 1301   Vital Signs   Temp 97.5 °F (36.4 °C)   Temp src Oral   Pulse 71   Heart Rate Source Monitor   Resp 16   SpO2 97 %   O2 Device (Oxygen Therapy) room air   /70   BP Location Left arm   BP Method Automatic   Patient Position Lying       Patient arrived back on floor from surgery.  VSS.  NAD noted.  Pain medication given.  Family at the bedside.  Will continue to monitor.

## 2017-01-08 LAB
ALBUMIN SERPL BCP-MCNC: 1.5 G/DL
ALP SERPL-CCNC: 67 U/L
ALT SERPL W/O P-5'-P-CCNC: 7 U/L
ANION GAP SERPL CALC-SCNC: 4 MMOL/L
ANISOCYTOSIS BLD QL SMEAR: SLIGHT
AST SERPL-CCNC: 20 U/L
BASOPHILS # BLD AUTO: 0.03 K/UL
BASOPHILS # BLD AUTO: ABNORMAL K/UL
BASOPHILS NFR BLD: 0 %
BASOPHILS NFR BLD: 0.3 %
BILIRUB SERPL-MCNC: 0.3 MG/DL
BUN SERPL-MCNC: 33 MG/DL
CALCIUM SERPL-MCNC: 9.4 MG/DL
CHLORIDE SERPL-SCNC: 112 MMOL/L
CO2 SERPL-SCNC: 24 MMOL/L
CREAT SERPL-MCNC: 1.7 MG/DL
CRP SERPL-MCNC: 90.2 MG/L
DIFFERENTIAL METHOD: ABNORMAL
DIFFERENTIAL METHOD: ABNORMAL
EOSINOPHIL # BLD AUTO: 0.2 K/UL
EOSINOPHIL # BLD AUTO: ABNORMAL K/UL
EOSINOPHIL NFR BLD: 1 %
EOSINOPHIL NFR BLD: 2.1 %
ERYTHROCYTE [DISTWIDTH] IN BLOOD BY AUTOMATED COUNT: 15.9 %
ERYTHROCYTE [DISTWIDTH] IN BLOOD BY AUTOMATED COUNT: 15.9 %
ERYTHROCYTE [SEDIMENTATION RATE] IN BLOOD BY WESTERGREN METHOD: 82 MM/HR
EST. GFR  (AFRICAN AMERICAN): 33 ML/MIN/1.73 M^2
EST. GFR  (NON AFRICAN AMERICAN): 28 ML/MIN/1.73 M^2
GLUCOSE SERPL-MCNC: 84 MG/DL
HCT VFR BLD AUTO: 26.8 %
HCT VFR BLD AUTO: 27.2 %
HGB BLD-MCNC: 8.2 G/DL
HGB BLD-MCNC: 8.4 G/DL
HYPOCHROMIA BLD QL SMEAR: ABNORMAL
LYMPHOCYTES # BLD AUTO: 1.9 K/UL
LYMPHOCYTES # BLD AUTO: ABNORMAL K/UL
LYMPHOCYTES NFR BLD: 14 %
LYMPHOCYTES NFR BLD: 18.3 %
MAGNESIUM SERPL-MCNC: 1.7 MG/DL
MCH RBC QN AUTO: 29.2 PG
MCH RBC QN AUTO: 29.4 PG
MCHC RBC AUTO-ENTMCNC: 30.6 %
MCHC RBC AUTO-ENTMCNC: 30.9 %
MCV RBC AUTO: 95 FL
MCV RBC AUTO: 95 FL
MONOCYTES # BLD AUTO: 0.9 K/UL
MONOCYTES # BLD AUTO: ABNORMAL K/UL
MONOCYTES NFR BLD: 8 %
MONOCYTES NFR BLD: 8.9 %
NEUTROPHILS # BLD AUTO: 7.4 K/UL
NEUTROPHILS NFR BLD: 70.4 %
NEUTROPHILS NFR BLD: 76 %
NEUTS BAND NFR BLD MANUAL: 1 %
OVALOCYTES BLD QL SMEAR: ABNORMAL
PHOSPHATE SERPL-MCNC: 3.2 MG/DL
PLATELET # BLD AUTO: 283 K/UL
PLATELET # BLD AUTO: 371 K/UL
PLATELET BLD QL SMEAR: ABNORMAL
PMV BLD AUTO: 9.5 FL
PMV BLD AUTO: 9.8 FL
POCT GLUCOSE: 95 MG/DL (ref 70–110)
POIKILOCYTOSIS BLD QL SMEAR: SLIGHT
POLYCHROMASIA BLD QL SMEAR: ABNORMAL
POTASSIUM SERPL-SCNC: 4.5 MMOL/L
PROT SERPL-MCNC: 4.8 G/DL
RBC # BLD AUTO: 2.81 M/UL
RBC # BLD AUTO: 2.86 M/UL
SODIUM SERPL-SCNC: 140 MMOL/L
WBC # BLD AUTO: 10.57 K/UL
WBC # BLD AUTO: 10.72 K/UL

## 2017-01-08 PROCEDURE — 97166 OT EVAL MOD COMPLEX 45 MIN: CPT

## 2017-01-08 PROCEDURE — 63600175 PHARM REV CODE 636 W HCPCS: Performed by: FAMILY MEDICINE

## 2017-01-08 PROCEDURE — 25000003 PHARM REV CODE 250: Performed by: FAMILY MEDICINE

## 2017-01-08 PROCEDURE — 85007 BL SMEAR W/DIFF WBC COUNT: CPT

## 2017-01-08 PROCEDURE — 85027 COMPLETE CBC AUTOMATED: CPT

## 2017-01-08 PROCEDURE — 85025 COMPLETE CBC W/AUTO DIFF WBC: CPT

## 2017-01-08 PROCEDURE — 83735 ASSAY OF MAGNESIUM: CPT

## 2017-01-08 PROCEDURE — 84100 ASSAY OF PHOSPHORUS: CPT

## 2017-01-08 PROCEDURE — 25000003 PHARM REV CODE 250: Performed by: ANESTHESIOLOGY

## 2017-01-08 PROCEDURE — G8987 SELF CARE CURRENT STATUS: HCPCS | Mod: CL

## 2017-01-08 PROCEDURE — 85652 RBC SED RATE AUTOMATED: CPT

## 2017-01-08 PROCEDURE — 11000001 HC ACUTE MED/SURG PRIVATE ROOM

## 2017-01-08 PROCEDURE — 86140 C-REACTIVE PROTEIN: CPT

## 2017-01-08 PROCEDURE — 97535 SELF CARE MNGMENT TRAINING: CPT

## 2017-01-08 PROCEDURE — 25000003 PHARM REV CODE 250: Performed by: STUDENT IN AN ORGANIZED HEALTH CARE EDUCATION/TRAINING PROGRAM

## 2017-01-08 PROCEDURE — G8988 SELF CARE GOAL STATUS: HCPCS | Mod: CJ

## 2017-01-08 PROCEDURE — 80053 COMPREHEN METABOLIC PANEL: CPT

## 2017-01-08 PROCEDURE — 94761 N-INVAS EAR/PLS OXIMETRY MLT: CPT

## 2017-01-08 RX ORDER — HYDROCORTISONE 25 MG/G
CREAM TOPICAL 2 TIMES DAILY
Status: DISCONTINUED | OUTPATIENT
Start: 2017-01-08 | End: 2017-01-09 | Stop reason: HOSPADM

## 2017-01-08 RX ADMIN — PANTOPRAZOLE SODIUM 40 MG: 40 TABLET, DELAYED RELEASE ORAL at 09:01

## 2017-01-08 RX ADMIN — OXYCODONE AND ACETAMINOPHEN 1 TABLET: 7.5; 325 TABLET ORAL at 12:01

## 2017-01-08 RX ADMIN — ENOXAPARIN SODIUM 30 MG: 40 INJECTION, SOLUTION INTRAVENOUS; SUBCUTANEOUS at 12:01

## 2017-01-08 RX ADMIN — HYDROCORTISONE: 25 CREAM TOPICAL at 09:01

## 2017-01-08 RX ADMIN — SODIUM CHLORIDE, PRESERVATIVE FREE 3 ML: 5 INJECTION INTRAVENOUS at 10:01

## 2017-01-08 RX ADMIN — OXYCODONE AND ACETAMINOPHEN 1 TABLET: 7.5; 325 TABLET ORAL at 05:01

## 2017-01-08 RX ADMIN — SODIUM CHLORIDE, PRESERVATIVE FREE 3 ML: 5 INJECTION INTRAVENOUS at 02:01

## 2017-01-08 RX ADMIN — METOPROLOL TARTRATE 50 MG: 50 TABLET ORAL at 09:01

## 2017-01-08 RX ADMIN — SODIUM CHLORIDE, PRESERVATIVE FREE 3 ML: 5 INJECTION INTRAVENOUS at 05:01

## 2017-01-08 RX ADMIN — FERROUS GLUCONATE 324 MG: 324 TABLET ORAL at 09:01

## 2017-01-08 RX ADMIN — ATORVASTATIN CALCIUM 80 MG: 40 TABLET, FILM COATED ORAL at 09:01

## 2017-01-08 RX ADMIN — MIRTAZAPINE 15 MG: 15 TABLET, FILM COATED ORAL at 09:01

## 2017-01-08 RX ADMIN — CLOPIDOGREL BISULFATE 75 MG: 75 TABLET ORAL at 09:01

## 2017-01-08 RX ADMIN — VITAMIN D, TAB 1000IU (100/BT) 2000 UNITS: 25 TAB at 09:01

## 2017-01-08 RX ADMIN — VANCOMYCIN HYDROCHLORIDE 1 G: 1 INJECTION, POWDER, LYOPHILIZED, FOR SOLUTION INTRAVENOUS at 09:01

## 2017-01-08 NOTE — PROGRESS NOTES
Notified Dr. Pritchard of patient's BP of 101/55.  MD verbalized understanding. Verbal telephone order received to hold BP meds tonight (amlodipine and metoprolol).  NAD noted. Will continue to monitor.

## 2017-01-08 NOTE — PT/OT/SLP PROGRESS
Physical Therapy      Adilia Rosas  MRN: [id number]    Patient not seen today secondary to Patient unwilling to participate (i would appreciate it if i could rest until tomorrow.). Will follow-up 1/9/17.    Manuel Cuevas, PT

## 2017-01-08 NOTE — PLAN OF CARE
Problem: Patient Care Overview  Goal: Plan of Care Review  Outcome: Ongoing (interventions implemented as appropriate)  No drainage to pin site  Anti foot drop boot maintained  Safety maintained  Contact isolation in progress   Afebrile     Left leg swelling continues  Pt self repositions  Pt requested any medication that causes bowel movements be held at present    Complied with pt wishes    Vancomycin held due to elevated level

## 2017-01-08 NOTE — PROGRESS NOTES
Progress Note    Admit Date: 1/6/2017   LOS: 2 days     SUBJECTIVE:     MAC.  Reports significant improvement in leg pain since going to the OR.    Scheduled Meds:   amlodipine  10 mg Oral QHS    atorvastatin  80 mg Oral Daily    clopidogrel  75 mg Oral Daily    enoxaparin  30 mg Subcutaneous Daily    ferrous gluconate  324 mg Oral Daily with breakfast    lubiprostone  24 mcg Oral BID WM    metoprolol tartrate  50 mg Oral BID    mirtazapine  15 mg Oral QHS    pantoprazole  40 mg Oral BID    senna-docusate 8.6-50 mg  1 tablet Oral Daily    sodium chloride 0.9%  3 mL Intravenous Q8H    vancomycin 1 g in dextrose 5 % 250 mL IVPB (ready to mix system)  1 g Intravenous Daily    vitamin D  2,000 Units Oral Daily     Continuous Infusions:     PRN Meds:acetaminophen, albuterol, dextrose 50%, dextrose 50%, diphenhydrAMINE, glucagon (human recombinant), glucose, glucose, hydrocodone-acetaminophen 5-325mg, HYDROmorphone, HYDROmorphone, morphine, oxycodone-acetaminophen, oxycodone-acetaminophen, promethazine (PHENERGAN) IVPB, sodium chloride 0.9%    Review of patient's allergies indicates:   Allergen Reactions    Celebrex [celecoxib] Other (See Comments)     Pain down her spine     General: No fever/chills or appetite changes  Cardio: denies chest pain/palpitations  Resp: denies shortness of breath  Abd: denies abdominal pain, nausea/vomiting  Extr: mild lower extremity pain   Neuro: denies confusion    OBJECTIVE:     Vital Signs (Most Recent)  Temp: 98 °F (36.7 °C) (01/08/17 0800)  Pulse: 70 (01/08/17 0800)  Resp: 17 (01/08/17 0800)  BP: (!) 109/59 (01/08/17 0800)  SpO2: 98 % (01/08/17 0856)    Vital Signs Range (Last 24H):  Temp:  [97.4 °F (36.3 °C)-98.4 °F (36.9 °C)]   Pulse:  [64-89]   Resp:  [13-24]   BP: ()/(52-74)   SpO2:  [94 %-99 %]     I & O (Last 24H):    Intake/Output Summary (Last 24 hours) at 01/08/17 0940  Last data filed at 01/08/17 0009   Gross per 24 hour   Intake           749.17 ml    Output               25 ml   Net           724.17 ml     Physical Exam:  General: well developed, well nourished, resting comfortably  Eyes: conjunctivae/corneas clear. PERRL..  Neck: supple, symmetrical, trachea midline, no JVD and thyroid not enlarged, symmetric, no tenderness/mass/nodules  Lungs: clear to auscultation bilaterally and normal respiratory effort  Cardiovascular: Heart: regular rate and rhythm, S1, S2 normal, no murmur, click, rub or gallop. Chest Wall: no tenderness. Extremities: no cyanosis or edema, or clubbing. Pulses: 2+ and symmetric.  Abdomen Abdomen: soft, non-tender non-distented; bowel sounds normal; no masses, no organomegaly.  Skin: Skin color, texture, turgor normal. No rashes or lesions  Musculoskeletal:no clubbing, cyanosis; Some pitting edema in bilateral LE's. Fixation in place over left lower extremity with dressings in place.   Neurologic: Normal strength and tone. No focal numbness or weakness  Psych/Behavioral: Normal.    Laboratory:  CBC:     Recent Labs  Lab 01/08/17  0520   WBC 10.72   RBC 2.86*   HGB 8.4*   HCT 27.2*   *   MCV 95   MCH 29.4   MCHC 30.9*     BMP:     Recent Labs  Lab 01/07/17  0602   GLU 82      K 4.6   *   CO2 24   BUN 37*   CREATININE 1.6*   CALCIUM 10.3   MG 1.8     CMP:     Recent Labs  Lab 01/07/17  0602   GLU 82   CALCIUM 10.3   ALBUMIN 1.7*   PROT 5.7*      K 4.6   CO2 24   *   BUN 37*   CREATININE 1.6*   ALKPHOS 80   ALT 10   AST 26   BILITOT 0.4     LFTs:     Recent Labs  Lab 01/07/17  0602   ALT 10   AST 26   ALKPHOS 80   BILITOT 0.4   PROT 5.7*   ALBUMIN 1.7*     Coagulation:     Recent Labs  Lab 01/06/17  1925   INR 1.1   APTT 33.8*     Awaiting AM labs.    ASSESSMENT/PLAN:     Ms. Rosas is a 81 year old female with PMH HTN well-controlled on medications, grade I diastolic HF, CKD Grade 3B, GERD, and arthritis who presents for medical management for irrigation/debridement of wound infection s/p ORIF:     Wound  Infection S/P ORIF  -left femur s/p wash out and abx spacers  -adequate pain control  -abx: vancomycin      HTN  -Continue Amlodipine, Lopressor  -Benazepril currently on hold     Grade I Diastolic HF  -No systolic dysfunction, only minor diastolic failure  -Revised Cardiac Risk Index between 1-6% for major cardiac event based on patients history     CKD Grade 3B  -GFR has been stable around mid 30's placing her in grade 3B  -Cr<2.0, does not add to revised Cardiac Risk Index     GERD  -Continue PPI     Arthritis  -Tylenol PRN, patient non-ambulatory currently.      Ppx:  Lovenox 30 mg  -pantoprazole     Dispo: F/u AM labs.    Jamaal Millan MD  [facility],  2  1/8/2017  9:51 AM

## 2017-01-08 NOTE — NURSING
Dr small notified  Stopped vancomycin in progress due to elevated vanc level noted on 01/07   Received approx 92 ml of 250 volume

## 2017-01-08 NOTE — PROGRESS NOTES
Interval:   Pt doing well.  Pain controlled.  No issues with SOB, CP, N/V, numbness/paresthesias.  No new complaints.    Vitals:  Temp:  [97.4 °F (36.3 °C)-98.4 °F (36.9 °C)] 98 °F (36.7 °C)  Pulse:  [64-89] 70  Resp:  [13-24] 17  BP: ()/(52-74) 109/59    Scheduled Meds:    amlodipine  10 mg Oral QHS    atorvastatin  80 mg Oral Daily    clopidogrel  75 mg Oral Daily    enoxaparin  30 mg Subcutaneous Daily    ferrous gluconate  324 mg Oral Daily with breakfast    lubiprostone  24 mcg Oral BID WM    metoprolol tartrate  50 mg Oral BID    mirtazapine  15 mg Oral QHS    pantoprazole  40 mg Oral BID    senna-docusate 8.6-50 mg  1 tablet Oral Daily    sodium chloride 0.9%  3 mL Intravenous Q8H    vancomycin 1 g in dextrose 5 % 250 mL IVPB (ready to mix system)  1 g Intravenous Daily    vitamin D  2,000 Units Oral Daily     Continuous Infusions:    PRN Meds: acetaminophen, albuterol, dextrose 50%, dextrose 50%, diphenhydrAMINE, glucagon (human recombinant), glucose, glucose, hydrocodone-acetaminophen 5-325mg, HYDROmorphone, HYDROmorphone, morphine, oxycodone-acetaminophen, oxycodone-acetaminophen, promethazine (PHENERGAN) IVPB, sodium chloride 0.9%    Diet: Diet Adult Regular    Trended Lab Data:    Recent Labs  Lab 01/02/17  0512 01/05/17  0650 01/06/17  0934 01/07/17  0602 01/08/17  0520   WBC 10.16 9.89 11.10 10.49 10.72   HGB 8.1* 6.9* 11.1@RBC.* 10.2* 8.4*   HCT 27.0* 22.7* 36.0* 32.7* 27.2*    387* 363* 433* 371*   MCV 94 95 95 94 95   RDW 16.6* 16.4* 16.2* 15.8* 15.9*    140  --  140  --    K 5.4* 4.7  --  4.6  --    * 111*  --  111*  --    CO2 22* 25  --  24  --    BUN 51* 45*  --  37*  --    CREATININE 1.6* 1.6*  --  1.6*  --    GLU 87 72  --  82  --    PROT  --   --   --  5.7*  --    ALBUMIN  --   --   --  1.7*  --    BILITOT  --   --   --  0.4  --    AST  --   --   --  26  --    ALKPHOS  --   --   --  80  --    ALT  --   --   --  10  --        I/O last 3 completed  shifts:  In: 749.2 [P.O.:120; I.V.:629.2]  Out: 375 [Urine:350; Blood:25]    Exam:  NAD, A+Ox3  RRR  No increased WOB    LLE:  Dressing c/d/i  Scan serosanguinous drainage on dressing  Motor: + ehl, fhl, ta, gs  SILT: t/s/s/sp/dp  2+ DP, bcr, wwp    Impression:   81yF with L distal femur fracture complicated by SSI    Plan:  - Incision looks good this morning.  - Ordered CRP, ESR  - pain control  - IV vanc, dosage per primary  - defer medical management to primary team  - NWB LLE, PT/OT  - DVT ppx - lovenox  - Ok from ortho standpoint to return to LTAC early this upcoming week.

## 2017-01-08 NOTE — PLAN OF CARE
Problem: Patient Care Overview  Goal: Plan of Care Review  Outcome: Ongoing (interventions implemented as appropriate)  No distress noted at this time.

## 2017-01-09 VITALS
HEIGHT: 62 IN | TEMPERATURE: 100 F | OXYGEN SATURATION: 96 % | HEART RATE: 76 BPM | RESPIRATION RATE: 16 BRPM | DIASTOLIC BLOOD PRESSURE: 55 MMHG | WEIGHT: 165.38 LBS | BODY MASS INDEX: 30.44 KG/M2 | SYSTOLIC BLOOD PRESSURE: 96 MMHG

## 2017-01-09 LAB
ALBUMIN SERPL BCP-MCNC: 1.5 G/DL
ALP SERPL-CCNC: 67 U/L
ALT SERPL W/O P-5'-P-CCNC: 7 U/L
ANION GAP SERPL CALC-SCNC: 5 MMOL/L
AST SERPL-CCNC: 21 U/L
BASOPHILS # BLD AUTO: 0.03 K/UL
BASOPHILS # BLD AUTO: 0.03 K/UL
BASOPHILS NFR BLD: 0.3 %
BASOPHILS NFR BLD: 0.3 %
BILIRUB SERPL-MCNC: 0.3 MG/DL
BUN SERPL-MCNC: 29 MG/DL
CALCIUM SERPL-MCNC: 9.2 MG/DL
CHLORIDE SERPL-SCNC: 113 MMOL/L
CO2 SERPL-SCNC: 22 MMOL/L
CREAT SERPL-MCNC: 1.7 MG/DL
DIFFERENTIAL METHOD: ABNORMAL
DIFFERENTIAL METHOD: ABNORMAL
EOSINOPHIL # BLD AUTO: 0.3 K/UL
EOSINOPHIL # BLD AUTO: 0.3 K/UL
EOSINOPHIL NFR BLD: 2.8 %
EOSINOPHIL NFR BLD: 2.8 %
ERYTHROCYTE [DISTWIDTH] IN BLOOD BY AUTOMATED COUNT: 16 %
ERYTHROCYTE [DISTWIDTH] IN BLOOD BY AUTOMATED COUNT: 16 %
EST. GFR  (AFRICAN AMERICAN): 33 ML/MIN/1.73 M^2
EST. GFR  (NON AFRICAN AMERICAN): 28 ML/MIN/1.73 M^2
GLUCOSE SERPL-MCNC: 71 MG/DL
HCT VFR BLD AUTO: 25.9 %
HCT VFR BLD AUTO: 25.9 %
HGB BLD-MCNC: 8 G/DL
HGB BLD-MCNC: 8 G/DL
LYMPHOCYTES # BLD AUTO: 2 K/UL
LYMPHOCYTES # BLD AUTO: 2 K/UL
LYMPHOCYTES NFR BLD: 19.9 %
LYMPHOCYTES NFR BLD: 19.9 %
MAGNESIUM SERPL-MCNC: 1.6 MG/DL
MCH RBC QN AUTO: 29.4 PG
MCH RBC QN AUTO: 29.4 PG
MCHC RBC AUTO-ENTMCNC: 30.9 %
MCHC RBC AUTO-ENTMCNC: 30.9 %
MCV RBC AUTO: 95 FL
MCV RBC AUTO: 95 FL
MONOCYTES # BLD AUTO: 1 K/UL
MONOCYTES # BLD AUTO: 1 K/UL
MONOCYTES NFR BLD: 9.8 %
MONOCYTES NFR BLD: 9.8 %
NEUTROPHILS # BLD AUTO: 6.6 K/UL
NEUTROPHILS # BLD AUTO: 6.6 K/UL
NEUTROPHILS NFR BLD: 66.8 %
NEUTROPHILS NFR BLD: 66.8 %
PHOSPHATE SERPL-MCNC: 3 MG/DL
PLATELET # BLD AUTO: 361 K/UL
PLATELET # BLD AUTO: 361 K/UL
PMV BLD AUTO: 9.7 FL
PMV BLD AUTO: 9.7 FL
POTASSIUM SERPL-SCNC: 4.3 MMOL/L
PROT SERPL-MCNC: 4.8 G/DL
RBC # BLD AUTO: 2.72 M/UL
RBC # BLD AUTO: 2.72 M/UL
SODIUM SERPL-SCNC: 140 MMOL/L
WBC # BLD AUTO: 9.85 K/UL
WBC # BLD AUTO: 9.85 K/UL

## 2017-01-09 PROCEDURE — 63600175 PHARM REV CODE 636 W HCPCS: Performed by: FAMILY MEDICINE

## 2017-01-09 PROCEDURE — 25000003 PHARM REV CODE 250: Performed by: STUDENT IN AN ORGANIZED HEALTH CARE EDUCATION/TRAINING PROGRAM

## 2017-01-09 PROCEDURE — 80053 COMPREHEN METABOLIC PANEL: CPT

## 2017-01-09 PROCEDURE — 97535 SELF CARE MNGMENT TRAINING: CPT

## 2017-01-09 PROCEDURE — 25000003 PHARM REV CODE 250: Performed by: ANESTHESIOLOGY

## 2017-01-09 PROCEDURE — 25000003 PHARM REV CODE 250: Performed by: FAMILY MEDICINE

## 2017-01-09 PROCEDURE — 94761 N-INVAS EAR/PLS OXIMETRY MLT: CPT

## 2017-01-09 PROCEDURE — 63600175 PHARM REV CODE 636 W HCPCS: Performed by: ANESTHESIOLOGY

## 2017-01-09 PROCEDURE — 85025 COMPLETE CBC W/AUTO DIFF WBC: CPT

## 2017-01-09 PROCEDURE — 84100 ASSAY OF PHOSPHORUS: CPT

## 2017-01-09 PROCEDURE — 83735 ASSAY OF MAGNESIUM: CPT

## 2017-01-09 RX ADMIN — STANDARDIZED SENNA CONCENTRATE AND DOCUSATE SODIUM 1 TABLET: 8.6; 5 TABLET, FILM COATED ORAL at 10:01

## 2017-01-09 RX ADMIN — HYDROCORTISONE: 25 CREAM TOPICAL at 10:01

## 2017-01-09 RX ADMIN — SODIUM CHLORIDE, PRESERVATIVE FREE 3 ML: 5 INJECTION INTRAVENOUS at 02:01

## 2017-01-09 RX ADMIN — OXYCODONE AND ACETAMINOPHEN 1 TABLET: 7.5; 325 TABLET ORAL at 10:01

## 2017-01-09 RX ADMIN — HYDROMORPHONE HYDROCHLORIDE 0.2 MG: 2 INJECTION, SOLUTION INTRAMUSCULAR; INTRAVENOUS; SUBCUTANEOUS at 02:01

## 2017-01-09 RX ADMIN — ATORVASTATIN CALCIUM 80 MG: 40 TABLET, FILM COATED ORAL at 10:01

## 2017-01-09 RX ADMIN — LUBIPROSTONE 24 MCG: 24 CAPSULE, GELATIN COATED ORAL at 10:01

## 2017-01-09 RX ADMIN — METOPROLOL TARTRATE 50 MG: 50 TABLET ORAL at 10:01

## 2017-01-09 RX ADMIN — PANTOPRAZOLE SODIUM 40 MG: 40 TABLET, DELAYED RELEASE ORAL at 10:01

## 2017-01-09 RX ADMIN — ENOXAPARIN SODIUM 30 MG: 40 INJECTION, SOLUTION INTRAVENOUS; SUBCUTANEOUS at 12:01

## 2017-01-09 RX ADMIN — CLOPIDOGREL BISULFATE 75 MG: 75 TABLET ORAL at 10:01

## 2017-01-09 RX ADMIN — VITAMIN D, TAB 1000IU (100/BT) 2000 UNITS: 25 TAB at 10:01

## 2017-01-09 RX ADMIN — FERROUS GLUCONATE 324 MG: 324 TABLET ORAL at 10:01

## 2017-01-09 RX ADMIN — SODIUM CHLORIDE, PRESERVATIVE FREE 3 ML: 5 INJECTION INTRAVENOUS at 05:01

## 2017-01-09 NOTE — SUBJECTIVE & OBJECTIVE
Past Medical History   Diagnosis Date    Arthritis     Elevated cholesterol     GERD (gastroesophageal reflux disease)     Hypertension        Past Surgical History   Procedure Laterality Date    Hysterectomy      Hernia repair      Blood clot removal         Review of patient's allergies indicates:   Allergen Reactions    Celebrex [celecoxib] Other (See Comments)     Pain down her spine       Medications:  Prescriptions Prior to Admission   Medication Sig    albuterol 90 mcg/actuation inhaler Inhale 2 puffs into the lungs every 4 (four) hours as needed for Wheezing.    amlodipine (NORVASC) 10 MG tablet Take 10 mg by mouth every evening.    ascorbic acid, vitamin C, (VITAMIN C) 500 MG tablet Take 500 mg by mouth once daily.    atorvastatin (LIPITOR) 80 MG tablet Take 80 mg by mouth once daily.    benazepril (LOTENSIN) 20 MG tablet Take 20 mg by mouth once daily.    bisacodyl (DULCOLAX) 10 mg Supp Place 1 suppository (10 mg total) rectally daily as needed (Until bowel movement if patient has no bowel movement for 2 days).    clopidogrel (PLAVIX) 75 mg tablet Take 75 mg by mouth once daily.    ferrous gluconate (FERGON) 325 MG Tab Take 325 mg by mouth daily with breakfast.    hydrocodone-acetaminophen 5-325mg (NORCO) 5-325 mg per tablet Take 2 tablets by mouth every 4 (four) hours as needed.    lubiprostone (AMITIZA) 24 MCG Cap Take 24 mcg by mouth 2 (two) times daily with meals.    magnesium 200 mg Tab Take 400 mg by mouth 2 (two) times daily.    metoprolol tartrate (LOPRESSOR) 50 MG tablet Take 50 mg by mouth 2 (two) times daily.    mirtazapine (REMERON) 15 MG tablet Take 15 mg by mouth every evening.    pantoprazole (PROTONIX) 40 MG tablet Take 40 mg by mouth 2 (two) times daily.    prednisoLONE acetate (PRED FORTE) 1 % DrpS 1 drop 4 (four) times daily.    senna-docusate 8.6-50 mg (PERICOLACE) 8.6-50 mg per tablet Take 1 tablet by mouth once daily.    simethicone 180 mg Cap Take 180 mg by  mouth 4 (four) times daily as needed (gas pain).    vitamin D 1000 units Tab Take 370 mg by mouth once daily.    [DISCONTINUED] VANCOMYCIN HCL (VANCOMYCIN 1 G/250 ML D5W, READY TO MIX SYSTEM,) Inject 258.4 mLs (1,033.6 mg total) into the vein once daily.     Antibiotics     Start     Stop Route Frequency Ordered    01/10/17 0900  vancomycin 1 g in dextrose 5 % 250 mL IVPB (ready to mix system)      -- IV Daily 01/08/17 1058        Antifungals     None        Antivirals     None           Immunization History   Administered Date(s) Administered    Influenza - High Dose 09/21/2016    PPD Test 12/10/2016       Family History     None        Social History     Social History    Marital status:      Spouse name: N/A    Number of children: N/A    Years of education: N/A     Social History Main Topics    Smoking status: Never Smoker    Smokeless tobacco: None    Alcohol use No    Drug use: No    Sexual activity: Not Currently     Other Topics Concern    None     Social History Narrative     Travel History:   Has patient traveled outside of the [place]?  No  Has patient traveled outside of [place]? No      Review of Systems   All other systems reviewed and are negative.    Objective:     Vital Signs (Most Recent):  Temp: 99.6 °F (37.6 °C) (01/09/17 1237)  Pulse: 76 (01/09/17 1237)  Resp: 16 (01/09/17 1237)  BP: (!) 96/55 (01/09/17 1237)  SpO2: 96 % (01/09/17 1147) Vital Signs (24h Range):  Temp:  [98.2 °F (36.8 °C)-99.6 °F (37.6 °C)] 99.6 °F (37.6 °C)  Pulse:  [71-76] 76  Resp:  [16-18] 16  BP: ()/(55-67) 96/55     Weight: 75 kg (165 lb 5.5 oz)  Body mass index is 30.24 kg/(m^2).    Estimated Creatinine Clearance: 25.5 mL/min (based on Cr of 1.7).    Physical Exam   Constitutional: She is oriented to person, place, and time. She appears well-developed and well-nourished. No distress.   HENT:   Head: Normocephalic and atraumatic.   Mouth/Throat: Oropharynx is clear and moist.   Eyes:  Conjunctivae and EOM are normal. Pupils are equal, round, and reactive to light.   Neck: Normal range of motion. Neck supple. No JVD present.   Cardiovascular: Normal rate, regular rhythm and normal heart sounds.    Faint pedal pulses   Pulmonary/Chest: Effort normal and breath sounds normal.   Abdominal: Soft. Bowel sounds are normal. She exhibits no distension and no mass. There is no tenderness.   Genitourinary:   Genitourinary Comments: No gardner   Musculoskeletal:   LLE 2-3+ edema; wound draining moderate amount of serosang fluid; ext fix thigh to tibia in place. RLE 2+; UE normal; RUE PICC in place   Lymphadenopathy:     She has no cervical adenopathy.   Neurological: She is alert and oriented to person, place, and time. She exhibits normal muscle tone. Coordination normal.   Skin:   As above   Psychiatric:   Cooperative and appropriate   Nursing note and vitals reviewed.    Significant Labs:   BMP:   Recent Labs  Lab 01/09/17  0500   GLU 71      K 4.3   *   CO2 22*   BUN 29*   CREATININE 1.7*   CALCIUM 9.2   MG 1.6     CBC:   Recent Labs  Lab 01/08/17  0520 01/08/17  1053 01/09/17  0500   WBC 10.72 10.57 9.85  9.85   HGB 8.4* 8.2* 8.0*  8.0*   HCT 27.2* 26.8* 25.9*  25.9*   * 283 361*  361*       Significant Imaging: I have reviewed all pertinent imaging results/findings within the past 24 hours.

## 2017-01-09 NOTE — PLAN OF CARE
Problem: Patient Care Overview  Goal: Plan of Care Review  Pt on prn therapy and doesn't require respiratory tx at this time .Pt on RA SPO2 95%.  No apparent distress.  Will continue to monitor.

## 2017-01-09 NOTE — PLAN OF CARE
Pt ready to transfer back ton [facility].Facility transfer orders sent to [facility].  Pt will be going to room 556. Okay for nurse to call report at 3pm to [phone].   Glenda , bedside nurse updated of above.     Family at  Bedside updated on d/c status, family and pt agree with d/c plan.         01/09/17 1445   Final Note   Assessment Type Final Discharge Note   Discharge Disposition Home   Discharge planning education complete? Yes   What phone number can be called within the next 1-3 days to see how you are doing after discharge? [phone]   Hospital Follow Up  Appt(s) scheduled? No  (pt transfering to [facility])   Discharge plans and expectations educations in teach back method with documentation complete? Yes   Offered [facility] -- Bedside Delivery? n/a   Discharge/Hospital Encounter Summary to (non-[facility]) PCP n/a   Referral to Outpatient Case Management complete? n/a   Referral to / orders for Home Health Complete? n/a   30 day supply of medicines given at discharge, if documented non-compliance / non-adherence? n/a   Any social issues identified prior to discharge? No   Did you assess the readiness or willingness of the family or caregiver to support self management of care? Yes   Right Care Referral Info   Post Acute Recommendation Other   Referral Type LTAC   Facility Name [facility]

## 2017-01-09 NOTE — ASSESSMENT & PLAN NOTE
MRSA infection of ORIF; s/p hardware removal and now with debridement and abx bead placement.    Rec:  ---cont vancomycin for total 6 weeks (start date 12/27/16); target trough 15  ---wound care  ---ortho to do abx bead exchange in future

## 2017-01-09 NOTE — PT/OT/SLP PROGRESS
Physical Therapy      Adilia Rosas  MRN: [id number]  Time 1340    Patient not seen today secondary to 2/2 pain rated ( 9/10) primary nurse notified Will follow-up as able.    Rylan Membreno, PT

## 2017-01-09 NOTE — PLAN OF CARE
[facility]    FACILITY TRANSFER ORDERS      Patient Name: Adilia Rosas  YOB: 1935    PCP: Ely Dubois MD   PCP Address: [address] [place] [address] [address] [facility] / [facility] *  PCP Phone Number: [phone]  PCP Fax: [phone]    Encounter Date: 01/09/2017    Admit to: [facility]    Vital Signs:  Routine    Diagnoses:   Active Hospital Problems    Diagnosis  POA    *Post op infection [T81.4XXA]  Yes    Fracture of femur [S72.90XA]  Unknown      Resolved Hospital Problems    Diagnosis Date Resolved POA   No resolved problems to display.       Allergies:  Review of patient's allergies indicates:   Allergen Reactions    Celebrex [celecoxib] Other (See Comments)     Pain down her spine       Diet: cardiac diet    Activities: Ambulate with assistance    CONSULTS:    [facility]  [facility]    Medications: Review discharge medications with patient and family and provide education.      Current Discharge Medication List      CONTINUE these medications which have CHANGED    Details   VANCOMYCIN HCL (VANCOMYCIN 1 G/250 ML D5W, READY TO MIX SYSTEM,) Inject 250 mLs (1 g total) into the vein once daily.         CONTINUE these medications which have NOT CHANGED    Details   albuterol 90 mcg/actuation inhaler Inhale 2 puffs into the lungs every 4 (four) hours as needed for Wheezing.      amlodipine (NORVASC) 10 MG tablet Take 10 mg by mouth every evening.      ascorbic acid, vitamin C, (VITAMIN C) 500 MG tablet Take 500 mg by mouth once daily.      atorvastatin (LIPITOR) 80 MG tablet Take 80 mg by mouth once daily.      benazepril (LOTENSIN) 20 MG tablet Take 20 mg by mouth once daily.      bisacodyl (DULCOLAX) 10 mg Supp Place 1 suppository (10 mg total) rectally daily as needed (Until bowel movement if patient has no bowel movement for 2 days).  Refills: 0      clopidogrel (PLAVIX) 75 mg tablet Take 75 mg by mouth once daily.      ferrous gluconate (FERGON) 325 MG  Tab Take 325 mg by mouth daily with breakfast.      hydrocodone-acetaminophen 5-325mg (NORCO) 5-325 mg per tablet Take 2 tablets by mouth every 4 (four) hours as needed.  Qty: 50 tablet, Refills: 0      lubiprostone (AMITIZA) 24 MCG Cap Take 24 mcg by mouth 2 (two) times daily with meals.      magnesium 200 mg Tab Take 400 mg by mouth 2 (two) times daily.      metoprolol tartrate (LOPRESSOR) 50 MG tablet Take 50 mg by mouth 2 (two) times daily.      mirtazapine (REMERON) 15 MG tablet Take 15 mg by mouth every evening.      pantoprazole (PROTONIX) 40 MG tablet Take 40 mg by mouth 2 (two) times daily.      prednisoLONE acetate (PRED FORTE) 1 % DrpS 1 drop 4 (four) times daily.      senna-docusate 8.6-50 mg (PERICOLACE) 8.6-50 mg per tablet Take 1 tablet by mouth once daily.      simethicone 180 mg Cap Take 180 mg by mouth 4 (four) times daily as needed (gas pain).      vitamin D 1000 units Tab Take 370 mg by mouth once daily.                  _________________________________  Tucker Palacios MD  01/09/2017

## 2017-01-09 NOTE — PT/OT/SLP EVAL
Occupational Therapy  Evaluation/Treatment    Adilia Rosas   MRN: [id number]   Admitting Diagnosis: Post op infection    OT Date of Treatment: 01/08/17   OT Start Time: 1628  OT Stop Time: 1659  OT Total Time (min): 31 min    Billable Minutes:  Evaluation 10  Self Care/Home Management 20    Diagnosis: Post op infection       Past Medical History   Diagnosis Date    Arthritis     Elevated cholesterol     GERD (gastroesophageal reflux disease)     Hypertension       Past Surgical History   Procedure Laterality Date    Hysterectomy      Hernia repair      Blood clot removal             General Precautions: Standard, fall, contact  Orthopedic Precautions: LUE non weight bearing  Braces:      Do you have any cultural, spiritual, Christianity conflicts, given your current situation?: None     Patient History:  Living Environment  Lives With: child(francy), adult  Living Arrangements: house  Home Accessibility: stairs within home  Home Layout: Able to live on 1st floor  Stair Railings at Home: outside, present at both sides  Transportation Available: family or friend will provide  Living Environment Comment: Pt. lives in 2 story condo with granddaughters with tub/shower combo, half bath downstairs.  Pt. has shower chair, RW, BSC.    Prior level of function:   Bed Mobility/Transfers: independent  Grooming: independent  Bathing: independent  Upper Body Dressing: independent  Lower Body Dressing: independent  Toileting: independent  Home Management Skills: independent  Homemaking Responsibilities: Yes  Meal Prep Responsibility: Primary  Laundry Responsibility: Primary  Cleaning Responsibility: Primary  Bill Paying/Finance Responsibility: Primary  Shopping Responsibility: Primary   Responsibility: Primary  Homemaking Comments: Pt. & family report independence with all home making tasks.  Driving License: Yes  Mode of Transportation: Car, Family  Occupation: Retired  IADL Comments: Pt. & family report independence with  all IADL tasks.     Dominant hand: right    Subjective:  Communicated with nursing prior to session.  Pt. Agreed to initial OT eval.    Chief Complaint: Pt. C/o 8/10 pain to LLE.  Patient/Family stated goals: Return to PLOF.    Pain Ratin/10  Location - Side: Left     Location: thigh     Pain Rating Post-Intervention: 8/10    Objective:  Patient found with:  (Ex fix to LLE femur area)    Cognitive Exam:  Oriented to: Person, Place, Time and Situation  Follows Commands/attention: Follows multistep  commands  Communication: clear/fluent  Memory:  No Deficits noted  Safety awareness/insight to disability: intact  Coping skills/emotional control: Appropriate to situation    Visual/perceptual:  Intact; wears glasses    Physical Exam:  Postural examination/scapula alignment: Rounded shoulder and Head forward  Skin integrity: Visible skin intact  Edema: Edema to L-thigh    Sensation:   Intact    Upper Extremity Range of Motion:  Right Upper Extremity: WFL  Left Upper Extremity: WFL    Upper Extremity Strength:  Right Upper Extremity: WFL  Left Upper Extremity: WFL   Strength: WFL    Fine motor coordination:   Intact    Gross motor coordination: WFL    Functional Mobility:  Bed Mobility:  Rolling/Turning to Left: Contact guard assistance  Rolling/Turning Right:  (for bed pan placement)  Scooting/Bridging: Contact Guard Assistance  Supine to Sit: Minimum Assistance (assist with LLE management; used bed rails)  Sit to Supine: Minimum Assistance (assist with LLE management; used bed rails)    Transfers:       Functional Ambulation: Did not occur this day.    Activities of Daily Living:     Feeding adaptive equipment: None     UE adaptive equipment: None     LE adaptive equipment: None  Grooming Position: EOB  Grooming Level of Assistance: Stand by assistance, Supervision  Toileting Where Assessed: Bed level (assisted Pt. onto bed pan via L-rolling)           Bathing adaptive equipment: no assistive device    Balance:  "  Static Sit: FAIR: Maintains without assist, but unable to take any challenges   Dynamic Sit: FAIR+: Maintains balance through MINIMAL excursions of active trunk motion  Static Stand: 0: Needs MAXIMAL assist to maintain   Dynamic stand: 0: N/A    Therapeutic Activities and Exercises:  Performed supine -> sit with Shannan for LLE management & sit -> supine modA for BLE management all with use of bed rails. Upward scooting in bed with supervision for positioning in bed. Performed G/H task seated EOB with set-up for all grooming items 2* limited mobility. To benefit from continued OT services to increase independence in self-care. OT to follow.     AM-PAC 6 CLICK ADL  How much help from another person does this patient currently need?  1 = Unable, Total/Dependent Assistance  2 = A lot, Maximum/Moderate Assistance  3 = A little, Minimum/Contact Guard/Supervision  4 = None, Modified [place]/Independent    Putting on and taking off regular lower body clothing? : 1  Bathing (including washing, rinsing, drying)?: 1  Toileting, which includes using toilet, bedpan, or urinal? : 2  Putting on and taking off regular upper body clothing?: 3  Taking care of personal grooming such as brushing teeth?: 3  Eating meals?: 4  Total Score: 14    AM-PAC Raw Score CMS "G-Code Modifier Level of Impairment Assistance   6 % Total / Unable   7 - 9 CM 80 - 100% Maximal Assist   10 - 14 CL 60 - 80% Moderate Assist   15 - 19 CK 40 - 60% Moderate Assist   20 - 22 CJ 20 - 40% Minimal Assist   23 CI 1-20% SBA / CGA   24 CH 0% Independent/ Mod I       Patient left supine with all lines intact, call button in reach, nursing notified and family present    Assessment:  Adilia Rosas is a 81 y.o. female with a medical diagnosis of Post op infection and presents with impaired balance/stability, pain, decreased endurance, orthopedic precautions all limiting independence in self-care tasks.    Rehab identified problem list/impairments: Rehab " identified problem list/impairments: weakness, impaired endurance, impaired self care skills, impaired functional mobilty, gait instability, impaired balance, pain    Rehab potential is good.    Activity tolerance: Good    Discharge recommendations: Discharge Facility/Level Of Care Needs: LTACH (long term acute care hospital)     Barriers to discharge: Barriers to Discharge: None    Equipment recommendations: none     GOALS:   Occupational Therapy Goals        Problem: Occupational Therapy Goal    Goal Priority Disciplines Outcome Interventions   Occupational Therapy Goal     OT, PT/OT Ongoing (interventions implemented as appropriate)    Description:  Goals to be met by: 02/08/2017     Patient will increase functional independence with ADLs by performing:    UE Dressing with Set-up Assistance.  LE Dressing with Minimal Assistance.  Grooming while seated at sink with Set-up Assistance.  Toileting from bedside commode with Minimal Assistance for hygiene and clothing management.   Bathing from  edge of bed with Stand-by Assistance.  Supine to sit with Stand-by Assistance.  Toilet transfer to bedside commode with Minimal Assistance.    Recs:  Pt. With no AD/DME needs at this time.  To benefit from LTAC hospital placement.                PLAN:  Patient to be seen 5 x/week to address the above listed problems via self-care/home management, therapeutic activities, therapeutic exercises  Plan of Care expires: 02/08/17  Plan of Care reviewed with: patient, daughter, grandchild(francy)    OT G-codes  Functional [facility] Used: [facility]  Score: 14  Functional Limitation: Self care  Self Care Current Status (): CL  Self Care Goal Status (): XAVIER Benitez OT  01/08/2017

## 2017-01-09 NOTE — DISCHARGE SUMMARY
[facility]  Discharge Summary      Admit Date: 1/6/2017    Discharge Date and Time: 01/09/16    Attending Physician: Oksana Sow M.D.    Reason for Admission: Medical management for ortho - repeat irrigation/washout and antibiotic bead replacement    Procedures Performed: Procedure(s) (LRB):  IRRIGATION AND DEBRIDEMENT LOWER EXTREMITY (Left)  PLACEMENT SPACER-ANTIBIOTIC (Left)    Hospital Course (synopsis of major diagnoses, care, treatment, and services provided during the course of the hospital stay):   Adilia Rosas is a 81 year old female with a past medical history of grade I diastolic HF, CKD 3B, HTN, and wound infection s/p ORIF with history of washouts. Transferred from LT. Patient underwent irrigation/ debridement with antibiotic bead replacement in the left femur due to cultures being positive for staph aureus. Patient remained afebrile and vitals stable. ID was consulted and will follow up with her at LTAC. Patient deemed stable for discharge and will return to LTAC facility.      Consults: orthopedic surgery    Significant Diagnostic Studies: Labs:   BMP:   Recent Labs  Lab 01/09/17  0500   GLU 71      K 4.3   *   CO2 22*   BUN 29*   CREATININE 1.7*   CALCIUM 9.2   MG 1.6   , CMP   Recent Labs  Lab 01/09/17  0500      K 4.3   *   CO2 22*   GLU 71   BUN 29*   CREATININE 1.7*   CALCIUM 9.2   PROT 4.8*   ALBUMIN 1.5*   BILITOT 0.3   ALKPHOS 67   AST 21   ALT 7*   ANIONGAP 5*   ESTGFRAFRICA 33*   EGFRNONAA 28*   , CBC   Recent Labs  Lab 01/09/17  0500   WBC 9.85  9.85   HGB 8.0*  8.0*   HCT 25.9*  25.9*   *  361*   , INR   Lab Results   Component Value Date    INR 1.1 01/06/2017    INR 1.0 12/07/2016    INR 2.0 (H) 03/07/2007   , Lipid Panel   Lab Results   Component Value Date    CHOL 129 11/29/2006    HDL 38.0 (L) 11/29/2006    LDLCALC 72.4 11/29/2006    TRIG 93 11/29/2006    CHOLHDL 29.5 11/29/2006     Final Diagnoses:    Principal Problem:  Postoperative infection   Secondary Diagnoses:   Active Hospital Problems    Diagnosis  POA    *Postoperative infection [T81.4XXA]  Yes    Fracture of femur [S72.90XA]  Yes    Post op infection [T81.4XXA]  Yes      Resolved Hospital Problems    Diagnosis Date Resolved POA   No resolved problems to display.       Discharged Condition: stable    Disposition: Home or Self Care    Follow Up/Patient Instructions:     Medications:  Reconciled Home Medications:   Discharge Medication List as of 1/9/2017  4:46 PM      CONTINUE these medications which have CHANGED    Details   VANCOMYCIN HCL (VANCOMYCIN 1 G/250 ML D5W, READY TO MIX SYSTEM,) Inject 250 mLs (1 g total) into the vein once daily., Starting 1/10/2017, Until Discontinued, No Print         CONTINUE these medications which have NOT CHANGED    Details   albuterol 90 mcg/actuation inhaler Inhale 2 puffs into the lungs every 4 (four) hours as needed for Wheezing., Until Discontinued, Historical Med      amlodipine (NORVASC) 10 MG tablet Take 10 mg by mouth every evening., Until Discontinued, Historical Med      ascorbic acid, vitamin C, (VITAMIN C) 500 MG tablet Take 500 mg by mouth once daily., Until Discontinued, Historical Med      atorvastatin (LIPITOR) 80 MG tablet Take 80 mg by mouth once daily., Until Discontinued, Historical Med      benazepril (LOTENSIN) 20 MG tablet Take 20 mg by mouth once daily., Until Discontinued, Historical Med      bisacodyl (DULCOLAX) 10 mg Supp Place 1 suppository (10 mg total) rectally daily as needed (Until bowel movement if patient has no bowel movement for 2 days)., Starting 12/29/2016, Until Discontinued, OTC      clopidogrel (PLAVIX) 75 mg tablet Take 75 mg by mouth once daily., Until Discontinued, Historical Med      ferrous gluconate (FERGON) 325 MG Tab Take 325 mg by mouth daily with breakfast., Until Discontinued, Historical Med      hydrocodone-acetaminophen 5-325mg (NORCO) 5-325 mg per tablet Take 2 tablets by mouth  every 4 (four) hours as needed., Starting 12/12/2016, Until Discontinued, Print      lubiprostone (AMITIZA) 24 MCG Cap Take 24 mcg by mouth 2 (two) times daily with meals., Until Discontinued, Historical Med      magnesium 200 mg Tab Take 400 mg by mouth 2 (two) times daily., Until Discontinued, Historical Med      metoprolol tartrate (LOPRESSOR) 50 MG tablet Take 50 mg by mouth 2 (two) times daily., Until Discontinued, Historical Med      mirtazapine (REMERON) 15 MG tablet Take 15 mg by mouth every evening., Until Discontinued, Historical Med      pantoprazole (PROTONIX) 40 MG tablet Take 40 mg by mouth 2 (two) times daily., Until Discontinued, Historical Med      prednisoLONE acetate (PRED FORTE) 1 % DrpS 1 drop 4 (four) times daily., Until Discontinued, Historical Med      senna-docusate 8.6-50 mg (PERICOLACE) 8.6-50 mg per tablet Take 1 tablet by mouth once daily., Until Discontinued, Historical Med      simethicone 180 mg Cap Take 180 mg by mouth 4 (four) times daily as needed (gas pain)., Until Discontinued, Historical Med      vitamin D 1000 units Tab Take 370 mg by mouth once daily., Until Discontinued, Historical Med             Discharge Procedure Orders  Diet general     Patient transferred back to [facility].     1/10/2017 6:18 PM Kade Mejia M.D.

## 2017-01-09 NOTE — PLAN OF CARE
Problem: Patient Care Overview  Goal: Plan of Care Review  Outcome: Ongoing (interventions implemented as appropriate)  Pt is AAOx3. No complaints of nausea, vomiting, or diarrhea. No complaints of pain. On a regular diet. Tele [id number] and running normal sinus rhythm. Repositioned from side to side in bed. Tolerated all medications well.

## 2017-01-09 NOTE — PROGRESS NOTES
Progress Note    Admit Date: 1/6/2017   LOS: 3 days     SUBJECTIVE:     CATINAEON. Denies CP/SOB. No N/V/abd pain. No other complaints at this time.    Scheduled Meds:   amlodipine  10 mg Oral QHS    atorvastatin  80 mg Oral Daily    clopidogrel  75 mg Oral Daily    enoxaparin  30 mg Subcutaneous Daily    ferrous gluconate  324 mg Oral Daily with breakfast    hydrocortisone   Rectal BID    lubiprostone  24 mcg Oral BID WM    metoprolol tartrate  50 mg Oral BID    mirtazapine  15 mg Oral QHS    pantoprazole  40 mg Oral BID    senna-docusate 8.6-50 mg  1 tablet Oral Daily    sodium chloride 0.9%  3 mL Intravenous Q8H    [START ON 1/10/2017] vancomycin 1 g in dextrose 5 % 250 mL IVPB (ready to mix system)  1 g Intravenous Daily    vitamin D  2,000 Units Oral Daily     Continuous Infusions:   PRN Meds:acetaminophen, albuterol, dextrose 50%, dextrose 50%, diphenhydrAMINE, glucagon (human recombinant), glucose, glucose, hydrocodone-acetaminophen 5-325mg, HYDROmorphone, HYDROmorphone, morphine, oxycodone-acetaminophen, oxycodone-acetaminophen, promethazine (PHENERGAN) IVPB, sodium chloride 0.9%    Review of patient's allergies indicates:   Allergen Reactions    Celebrex [celecoxib] Other (See Comments)     Pain down her spine       OBJECTIVE:     Vital Signs (Most Recent)  Temp: 99.6 °F (37.6 °C) (01/09/17 1237)  Pulse: 76 (01/09/17 1237)  Resp: 16 (01/09/17 1237)  BP: (!) 96/55 (01/09/17 1237)  SpO2: 96 % (01/09/17 1147)    Vital Signs Range (Last 24H):  Temp:  [97.1 °F (36.2 °C)-99.6 °F (37.6 °C)]   Pulse:  [68-76]   Resp:  [16-19]   BP: ()/(55-67)   SpO2:  [94 %-99 %]     I & O (Last 24H):  Intake/Output Summary (Last 24 hours) at 01/09/17 1337  Last data filed at 01/09/17 0540   Gross per 24 hour   Intake              500 ml   Output              500 ml   Net                0 ml     Physical Exam:  General: well developed, well nourished, resting comfortably  Eyes: conjunctivae/corneas clear.  PERRL..  Neck: supple, symmetrical, trachea midline, no JVD and thyroid not enlarged, symmetric, no tenderness/mass/nodules  Lungs: clear to auscultation bilaterally and normal respiratory effort  Cardiovascular: Heart: regular rate and rhythm, S1, S2 normal, no murmur, click, rub or gallop. Chest Wall: no tenderness. Extremities: no cyanosis or edema, or clubbing. Pulses: 2+ and symmetric.  Abdomen Abdomen: soft, non-tender non-distented; bowel sounds normal; no masses, no organomegaly.  Skin: Skin color, texture, turgor normal. No rashes or lesions  Musculoskeletal:no clubbing, cyanosis; Some pitting edema in bilateral LE's. Fixation in place over left lower extremity with dressings in place.   Neurologic: Normal strength and tone. No focal numbness or weakness  Psych/Behavioral: Normal.    Laboratory:  CBC:   Recent Labs  Lab 01/09/17  0500   WBC 9.85  9.85   RBC 2.72*  2.72*   HGB 8.0*  8.0*   HCT 25.9*  25.9*   *  361*   MCV 95  95   MCH 29.4  29.4   MCHC 30.9*  30.9*     BMP:   Recent Labs  Lab 01/09/17  0500   GLU 71      K 4.3   *   CO2 22*   BUN 29*   CREATININE 1.7*   CALCIUM 9.2   MG 1.6     CMP:   Recent Labs  Lab 01/09/17  0500   GLU 71   CALCIUM 9.2   ALBUMIN 1.5*   PROT 4.8*      K 4.3   CO2 22*   *   BUN 29*   CREATININE 1.7*   ALKPHOS 67   ALT 7*   AST 21   BILITOT 0.3     LFTs:   Recent Labs  Lab 01/09/17  0500   ALT 7*   AST 21   ALKPHOS 67   BILITOT 0.3   PROT 4.8*   ALBUMIN 1.5*     Coagulation:   Recent Labs  Lab 01/06/17  1925   INR 1.1   APTT 33.8*       Diagnostic Results:  No new images.     ASSESSMENT/PLAN:     Ms. Rosas is a 81 year old female with PMH HTN well-controlled on medications, grade I diastolic HF, CKD Grade 3B, GERD, and arthritis who presents for medical management for irrigation/debridement of wound infection s/p ORIF:      Wound Infection S/P ORIF  -left femur s/p wash out and abx spacers  -adequate pain control  -abx:  vancomycin      HTN  -Continue Amlodipine, Lopressor  -Benazepril currently on hold      Grade I Diastolic HF  -No systolic dysfunction, only minor diastolic failure  -Revised Cardiac Risk Index between 1-6% for major cardiac event based on patients history      CKD Grade 3B  -GFR has been stable around mid 30's placing her in grade 3B  -Cr<2.0, does not add to revised Cardiac Risk Index      GERD  -Continue PPI      Arthritis  -Tylenol PRN, patient non-ambulatory currently.       Ppx:  Lovenox 30 mg  -pantoprazole      Dispo: f/u on ID recs. Ortho cleared for LTAC return.     1/9/2017 1:41 PM Kade Mejia M.D.

## 2017-01-09 NOTE — PLAN OF CARE
Problem: Occupational Therapy Goal  Goal: Occupational Therapy Goal  Goals to be met by: 02/08/2017     Patient will increase functional independence with ADLs by performing:    UE Dressing with Set-up Assistance.  LE Dressing with Minimal Assistance.  Grooming while seated at sink with Set-up Assistance.  Toileting from bedside commode with Minimal Assistance for hygiene and clothing management.   Bathing from edge of bed with Stand-by Assistance.  Supine to sit with Stand-by Assistance.  Toilet transfer to bedside commode with Minimal Assistance.    Recs: Pt. With no AD/DME needs at this time. To benefit from LTAC hospital placement.  Outcome: Ongoing (interventions implemented as appropriate)  OT initial eval complete.  Performed supine -> sit with Shannan for LLE management & sit -> supine modA for BLE management all with use of bed rails.  Upward scooting in bed with supervision for positioning in bed.  Performed G/H task seated EOB with set-up for all grooming items 2* limited mobility. To benefit from continued OT services to increase independence in self-care.  OT to follow.

## 2017-01-09 NOTE — PLAN OF CARE
Problem: Patient Care Overview  Goal: Plan of Care Review  Outcome: Ongoing (interventions implemented as appropriate)  No distress noted at this time; will cont to monitor.

## 2017-01-09 NOTE — PROGRESS NOTES
[facility]    Interval:   NAEON.  Pain well controlled.  No issues with SOB, N/V, trouble with bowel/bladder habits, numbness/paresthesias.  Worked with OT.    Vitals:  Temp:  [97.1 °F (36.2 °C)-98.2 °F (36.8 °C)] 98.2 °F (36.8 °C)  Pulse:  [65-88] 71  Resp:  [17-19] 18  BP: ()/(56-67) 120/67    Scheduled Meds:    amlodipine  10 mg Oral QHS    atorvastatin  80 mg Oral Daily    clopidogrel  75 mg Oral Daily    enoxaparin  30 mg Subcutaneous Daily    ferrous gluconate  324 mg Oral Daily with breakfast    hydrocortisone   Rectal BID    lubiprostone  24 mcg Oral BID WM    metoprolol tartrate  50 mg Oral BID    mirtazapine  15 mg Oral QHS    pantoprazole  40 mg Oral BID    senna-docusate 8.6-50 mg  1 tablet Oral Daily    sodium chloride 0.9%  3 mL Intravenous Q8H    [START ON 1/10/2017] vancomycin 1 g in dextrose 5 % 250 mL IVPB (ready to mix system)  1 g Intravenous Daily    vitamin D  2,000 Units Oral Daily     Continuous Infusions:    PRN Meds: acetaminophen, albuterol, dextrose 50%, dextrose 50%, diphenhydrAMINE, glucagon (human recombinant), glucose, glucose, hydrocodone-acetaminophen 5-325mg, HYDROmorphone, HYDROmorphone, morphine, oxycodone-acetaminophen, oxycodone-acetaminophen, promethazine (PHENERGAN) IVPB, sodium chloride 0.9%    Diet: Diet Adult Regular    Trended Lab Data:    Recent Labs  Lab 01/05/17  0650  01/07/17  0602 01/08/17  0520 01/08/17  1053   WBC 9.89  < > 10.49 10.72 10.57   HGB 6.9*  < > 10.2* 8.4* 8.2*   HCT 22.7*  < > 32.7* 27.2* 26.8*   *  < > 433* 371* 283   MCV 95  < > 94 95 95   RDW 16.4*  < > 15.8* 15.9* 15.9*     --  140  --  140   K 4.7  --  4.6  --  4.5   *  --  111*  --  112*   CO2 25  --  24  --  24   BUN 45*  --  37*  --  33*   CREATININE 1.6*  --  1.6*  --  1.7*   GLU 72  --  82  --  84   PROT  --   --  5.7*  --  4.8*   ALBUMIN  --   --  1.7*  --  1.5*   BILITOT  --   --  0.4  --  0.3   AST  --   --  26  --  20   ALKPHOS  --   --  80  --  67    ALT  --   --  10  --  7*   < > = values in this interval not displayed.    ESR 82  CRP 90.2      I/O last 3 completed shifts:  In: 1341.2 [P.O.:270; I.V.:979.2; IV Piggyback:92]  Out: 125 [Urine:100; Blood:25]    Exam:  NAD, A+Ox3  RRR  No increased WOB    LLE:  Scant, dry drainage on dressings  Incision well approximated  Motor:+ ehl, fhl, ta, gs  SILT: t/s/s/sp/dp  2+ DP, bcr, wwp    Impression:   81yF with L distal femur fracture complicated by SSI     Plan:  - pain control  - IV vanc, dosage per primary  - defer medical management to primary team  - NWB LLE, PT/OT  - DVT ppx - lovenox  - Ok from ortho standpoint to return to LTAC  - Will continue to follow, no plans to take back to OR in near future  - Will discuss with attending, Dr. Franklin

## 2017-01-09 NOTE — CONSULTS
[facility]  Infectious Disease  Consult Note    Patient Name: Adilia Rosas  MRN: [id number]  Admission Date: 1/6/2017  Hospital Length of Stay: 3 days  Attending Physician: Vasyl Mustafa III, MD  Primary Care Provider: Ely Dubois MD     Isolation Status: No active isolations    Patient information was obtained from patient and past medical records.      Inpatient consult to Infectious Diseases  Consult performed by: EFREM GONZALES  Consult ordered by: CORTEZ BUCIO  Reason for consult: MRSA ORIF left femoral infection        Assessment/Plan:     * Postoperative infection  MRSA infection of ORIF; s/p hardware removal and now with debridement and abx bead placement.    Rec:  ---cont vancomycin for total 6 weeks (start date 12/27/16); target trough 15  ---wound care  ---ortho to do abx bead exchange in future      Thank you for your consult. I will sign off. Please contact us if you have any additional questions.    Subjective:     Principal Problem: Postoperative infection    HPI: 81 year old woman with HTN GERD arthritis CAD intestinal ischemia and recent femoral fracture with ORIF at the beginning of this month now presents with infection at the surgical site.     12/7/16 patient fell at daughter's house. Initially went to [facility] and found to have left closed distal femoral fracture. [facility].   12/9/16 ORIF Surgery was successful  12/12/16 patient discharged to [place] for SNF; sta[;es removed by Dr. Franklin  12/22/16 patient started having increasing drainage and pain in left leg;   12/23/16 patient started having chills although no doc temperature  12/26/16 [place] staff noted drainage with odor and erythema; [facility]; Seen by ortho and taken to OR. Hardware removed and ext fix placed. Cultures taken  12/27/16 Cultures positive for MRSA; Started on Vancomycin  12/29/16 Transferred to LTAC on vancomycin  1/6/17 [facility] back [facility]  for debridement because of increased purulent drainage from left thigh incision  1/7/17 debridement with Abx bead placement; OR findings include small quantity of purulent material distally and internal dehiscence  1/9/17 ready to go to LTAC      Past Medical History   Diagnosis Date    Arthritis     Elevated cholesterol     GERD (gastroesophageal reflux disease)     Hypertension        Past Surgical History   Procedure Laterality Date    Hysterectomy      Hernia repair      Blood clot removal         Review of patient's allergies indicates:   Allergen Reactions    Celebrex [celecoxib] Other (See Comments)     Pain down her spine       Medications:  Prescriptions Prior to Admission   Medication Sig    albuterol 90 mcg/actuation inhaler Inhale 2 puffs into the lungs every 4 (four) hours as needed for Wheezing.    amlodipine (NORVASC) 10 MG tablet Take 10 mg by mouth every evening.    ascorbic acid, vitamin C, (VITAMIN C) 500 MG tablet Take 500 mg by mouth once daily.    atorvastatin (LIPITOR) 80 MG tablet Take 80 mg by mouth once daily.    benazepril (LOTENSIN) 20 MG tablet Take 20 mg by mouth once daily.    bisacodyl (DULCOLAX) 10 mg Supp Place 1 suppository (10 mg total) rectally daily as needed (Until bowel movement if patient has no bowel movement for 2 days).    clopidogrel (PLAVIX) 75 mg tablet Take 75 mg by mouth once daily.    ferrous gluconate (FERGON) 325 MG Tab Take 325 mg by mouth daily with breakfast.    hydrocodone-acetaminophen 5-325mg (NORCO) 5-325 mg per tablet Take 2 tablets by mouth every 4 (four) hours as needed.    lubiprostone (AMITIZA) 24 MCG Cap Take 24 mcg by mouth 2 (two) times daily with meals.    magnesium 200 mg Tab Take 400 mg by mouth 2 (two) times daily.    metoprolol tartrate (LOPRESSOR) 50 MG tablet Take 50 mg by mouth 2 (two) times daily.    mirtazapine (REMERON) 15 MG tablet Take 15 mg by mouth every evening.    pantoprazole (PROTONIX) 40 MG tablet Take 40 mg  by mouth 2 (two) times daily.    prednisoLONE acetate (PRED FORTE) 1 % DrpS 1 drop 4 (four) times daily.    senna-docusate 8.6-50 mg (PERICOLACE) 8.6-50 mg per tablet Take 1 tablet by mouth once daily.    simethicone 180 mg Cap Take 180 mg by mouth 4 (four) times daily as needed (gas pain).    vitamin D 1000 units Tab Take 370 mg by mouth once daily.    [DISCONTINUED] VANCOMYCIN HCL (VANCOMYCIN 1 G/250 ML D5W, READY TO MIX SYSTEM,) Inject 258.4 mLs (1,033.6 mg total) into the vein once daily.     Antibiotics     Start     Stop Route Frequency Ordered    01/10/17 0900  vancomycin 1 g in dextrose 5 % 250 mL IVPB (ready to mix system)      -- IV Daily 01/08/17 1058        Antifungals     None        Antivirals     None           Immunization History   Administered Date(s) Administered    Influenza - High Dose 09/21/2016    PPD Test 12/10/2016       Family History     None        Social History     Social History    Marital status:      Spouse name: N/A    Number of children: N/A    Years of education: N/A     Social History Main Topics    Smoking status: Never Smoker    Smokeless tobacco: None    Alcohol use No    Drug use: No    Sexual activity: Not Currently     Other Topics Concern    None     Social History Narrative     Travel History:   Has patient traveled outside of the [place]?  No  Has patient traveled outside of [place]? No      Review of Systems   All other systems reviewed and are negative.    Objective:     Vital Signs (Most Recent):  Temp: 99.6 °F (37.6 °C) (01/09/17 1237)  Pulse: 76 (01/09/17 1237)  Resp: 16 (01/09/17 1237)  BP: (!) 96/55 (01/09/17 1237)  SpO2: 96 % (01/09/17 1147) Vital Signs (24h Range):  Temp:  [98.2 °F (36.8 °C)-99.6 °F (37.6 °C)] 99.6 °F (37.6 °C)  Pulse:  [71-76] 76  Resp:  [16-18] 16  BP: ()/(55-67) 96/55     Weight: 75 kg (165 lb 5.5 oz)  Body mass index is 30.24 kg/(m^2).    Estimated Creatinine Clearance: 25.5 mL/min (based on Cr of  1.7).    Physical Exam   Constitutional: She is oriented to person, place, and time. She appears well-developed and well-nourished. No distress.   HENT:   Head: Normocephalic and atraumatic.   Mouth/Throat: Oropharynx is clear and moist.   Eyes: Conjunctivae and EOM are normal. Pupils are equal, round, and reactive to light.   Neck: Normal range of motion. Neck supple. No JVD present.   Cardiovascular: Normal rate, regular rhythm and normal heart sounds.    Faint pedal pulses   Pulmonary/Chest: Effort normal and breath sounds normal.   Abdominal: Soft. Bowel sounds are normal. She exhibits no distension and no mass. There is no tenderness.   Genitourinary:   Genitourinary Comments: No gardner   Musculoskeletal:   LLE 2-3+ edema; wound draining moderate amount of serosang fluid; ext fix thigh to tibia in place. RLE 2+; UE normal; RUE PICC in place   Lymphadenopathy:     She has no cervical adenopathy.   Neurological: She is alert and oriented to person, place, and time. She exhibits normal muscle tone. Coordination normal.   Skin:   As above   Psychiatric:   Cooperative and appropriate   Nursing note and vitals reviewed.    Significant Labs:   BMP:   Recent Labs  Lab 01/09/17  0500   GLU 71      K 4.3   *   CO2 22*   BUN 29*   CREATININE 1.7*   CALCIUM 9.2   MG 1.6     CBC:   Recent Labs  Lab 01/08/17  0520 01/08/17  1053 01/09/17  0500   WBC 10.72 10.57 9.85  9.85   HGB 8.4* 8.2* 8.0*  8.0*   HCT 27.2* 26.8* 25.9*  25.9*   * 283 361*  361*       Significant Imaging: I have reviewed all pertinent imaging results/findings within the past 24 hours.        Rico Sheikh MD  Infectious Disease  [facility]

## 2017-01-10 PROBLEM — M19.90 ARTHRITIS: Status: ACTIVE | Noted: 2017-01-10

## 2017-01-10 PROBLEM — K21.9 GASTROESOPHAGEAL REFLUX DISEASE: Status: ACTIVE | Noted: 2017-01-10

## 2017-01-10 NOTE — PLAN OF CARE
Problem: Occupational Therapy Goal  Goal: Occupational Therapy Goal  Goals to be met by: 02/08/2017     Patient will increase functional independence with ADLs by performing:    UE Dressing with Set-up Assistance. Not met  LE Dressing with Minimal Assistance. Not met  Grooming while seated at sink with Set-up Assistance. Not met  Toileting from bedside commode with Minimal Assistance for hygiene and clothing management. Not met  Bathing from edge of bed with Stand-by Assistance.  Supine to sit with Stand-by Assistance. Not met  Toilet transfer to bedside commode with Minimal Assistance. Not met    Pt. DC to L:TAC   Outcome: Unable to achieve outcome(s) by discharge  Pt. Did not meet OT goals 2/2 limited by pain; LLE external fixator in leg; weakness.  Bed mobility min assist; spong bath mod assist; UB dressing setup;. Pt DC to LTAC.  DC OT.

## 2017-01-10 NOTE — PROGRESS NOTES
Occupational Therapy   Treatment/Discharge Summary    Adilia Rosas   MRN: [id number]          01/09/17 1459   General   OT Date of Treatment 01/09/17   OT Start Time 1256   OT Stop Time 1325   OT Total Time (min) 29 min   Patient found with (LLE external fixator)   Precautions   General Precautions fall;contact   Orthopedic Precautions  LLE non weight bearing   Braces (LLE ext fixator)   Pain/Comfort   Pain Rating no pain   Location - Side Left   Location - Orientation lateral   Location thigh   Pain Addressed Pre-medicate for activity;Reposition;Nurse notified   Pain Rating Post-Intervention 8/10   Bed Mobility   Scooting/Bridging Minimum Assistance  (LLE support)   Supine to Sit Minimum Assistance  (for LLE assist over EOB)   Sit to Supine Minimum Assistance  (for LLE assist into bed with ext fixator)   Transfers   Sit <> Stand Assistance Activity did not occur   ADL's   UE Dressing Level of Assistance Set-up Assistance   LE Dressing Level of Assistance Total assistance  (R sock; multiple attempts to reach R foot seated EOB)   Grooming Position EOB   Bathing Where Assessed Edge of bed   Bathing Level of Assistance Moderate assistance  (for BLE lower legs; buttocks and periarea)   AM-PAC: How much help from another person does the patient currently need?   Putting on and taking off regular lower body clothing?  1   Bathing (including washing, rinsing, drying)? 3   Toileting, which includes using toilet, bedpan, or urinal?  2   Putting on and taking off regular upper body clothing? 3   Taking care of personal grooming such as brushing teeth? 3   Eating meals? 4   Total Score 16   Assessment   Rehab identified problem list/impairments weakness;impaired endurance;impaired self care skills;impaired functional mobilty;impaired balance;decreased lower extremity function;decreased safety awareness;orthopedic precautions;impaired skin;edema   Barriers to Discharge None   Discharge Recommendations   Equipment Needed After  Discharge none   Discharge Facility/Level Of Care Needs LTACH (long term acute care hospital)   Occupational Therapy Follow-up   OT Follow-up? No-pt DC to LTAC today   Plan of Care Review   Plan Of Care Reviewed With patient;family

## 2017-01-13 PROBLEM — S72.90XA FRACTURE OF FEMUR: Status: ACTIVE | Noted: 2017-01-13

## 2017-01-13 NOTE — PHYSICIAN QUERY
"PT Name: Adilia Rosas  MR #: [id number]  Physician Query Form - Procedure Clarification   Reviewer  [email or web address]      This form is a permanent document in the medical record.     Query Date: January 13, 2017  By submitting this query, we are merely seeking further clarification of documentation. Please utilize your independent clinical judgment when addressing the question(s) below.    The Medical record reflects the following:   Indicators     Supporting Clinical Findings Location in Medical Record   x Documentation of "Debridement"   Irrigation and Debridement Left Thigh Wound     -appearing tissue was debrided from the site with the use of curettes and sharply with a 10 blade scalpel. Op note 1/7    Documentation of "I & D"      EBL =      Other:       Excisional debridement is a surgical removal of all non vitalized tissue, necrosis or slough. The use of a sharp instrument does not always indicate that an excisional debridement was performed.  Non excisional debridement is the scraping away or removal of loose tissue fragments.    Provider, please specify the type of procedure(s) performed:    [ X ] Excisional Debridement (Specify site, type, depth of tissue removal, instrument, size of wound and EBL)      *Site: (Specify) _____Left thight_____________________________________________      *Depth of tissue excised:      [X  ] Skin/Subcutaneous  [ X ] Soft tissue  [  ] Fascia  [  ] Muscle  [  ] Bone     *Instrument Used:      [ X ] Scalpel  [  ] Scissors   [ X ] Curet   [  ] Other (Specify) __________________     *Size of wound after debridement : (Specify) ____6cm__________________________________     *EBL (Specify) _____100cc_____________________________________________    [  ] Non Excisional Debridement    [  ] Incision and Drainage    [  ] Other Procedure (Specify) ________________________________    [  ] Clinically Undetermined            "

## 2017-01-14 PROBLEM — I50.30 DIASTOLIC CONGESTIVE HEART FAILURE, NYHA CLASS 1: Status: ACTIVE | Noted: 2017-01-14

## 2017-02-07 PROBLEM — N18.32 CHRONIC KIDNEY DISEASE (CKD) STAGE G3B/A1, MODERATELY DECREASED GLOMERULAR FILTRATION RATE (GFR) BETWEEN 30-44 ML/MIN/1.73 SQUARE METER AND ALBUMINURIA CREATININE RATIO LESS THAN 30 MG/G: Status: ACTIVE | Noted: 2017-02-07

## 2017-02-07 PROBLEM — I50.30 DIASTOLIC HEART FAILURE: Status: ACTIVE | Noted: 2017-02-07

## 2017-03-08 ENCOUNTER — HOSPITAL ENCOUNTER (OUTPATIENT)
Dept: PREADMISSION TESTING | Facility: HOSPITAL | Age: 82
Discharge: HOME | End: 2017-03-08
Attending: ORTHOPAEDIC SURGERY
Payer: MEDICARE

## 2017-03-08 ENCOUNTER — ANESTHESIA EVENT (OUTPATIENT)
Dept: SURGERY | Facility: HOSPITAL | Age: 82
End: 2017-03-08
Payer: MEDICARE

## 2017-03-08 DIAGNOSIS — Z01.818 PRE-OP TESTING: Primary | ICD-10-CM

## 2017-03-08 RX ORDER — SODIUM CHLORIDE 9 MG/ML
INJECTION, SOLUTION INTRAVENOUS CONTINUOUS
Status: CANCELLED | OUTPATIENT
Start: 2017-03-08

## 2017-03-08 RX ORDER — LIDOCAINE HYDROCHLORIDE 10 MG/ML
1 INJECTION, SOLUTION EPIDURAL; INFILTRATION; INTRACAUDAL; PERINEURAL ONCE
Status: CANCELLED | OUTPATIENT
Start: 2017-03-08 | End: 2017-03-08

## 2017-03-08 NOTE — PRE-PROCEDURE INSTRUCTIONS
Pt arrived via EMS on stretcher accompanied by [facility] ambulance service.  EMS unable to stay with her for visit and unable to get her to lab for pre op labs.  GRACY Khalil, notified and saw pt for preop visit but all labs will be done the day of surgery.  Pt states she was recently seen by her PCP and testing was done.  Last clinic note and testing was requested by Dr. Dubois's office to be faxed to [phone]

## 2017-03-08 NOTE — DISCHARGE INSTRUCTIONS
Your surgery is scheduled for 3/10/17.    Please report to Outpatient Surgery Intake Office on the 2nd FLOOR at 5:30a.m.          INSTRUCTIONS IMPORTANT!!!  ¨ Do not eat or drink after 12 midnight-including water. OK to brush teeth, no   gum, candy or mints!    ¨ Take only these medicines with a small swallow of water-morning of surgery: Blood pressure medications      ____  Do not wear makeup, including mascara.  ____  No powder, lotions or creams to surgical area.  ____  Please remove all jewelry, including piercings and leave at home.  ____  No money or valuables needed. Please leave at home.  ____  Please bring any documents given by your doctor.  ____  If going home the same day, arrange for a ride home. You will not be able to             drive if Anesthesia was used.  ____  Children under 18 years require a parent / guardian present the entire time             they are in surgery / recovery.  ____  Wear loose fitting clothing. Allow for dressings, bandages.  ____  Stop Aspirin, Ibuprofen, Motrin and Aleve at least 3-5 days before surgery, unless otherwise instructed by your doctor, or the nurse.   You MAY use Tylenol/acetaminophen until day of surgery.  ____  Wash the surgical area with Hibiclens the night before surgery, and again the             morning of surgery.  Be sure to rinse hibiclens off completely (if instructed by   nurse).  ____  If you take diabetic medication, do not take am of surgery unless instructed by Doctor.  ____  Call MD for temperature above 101 degrees.  ____ Stop taking any Fish Oil supplement or any Vitamins that contain Vitamin E at least 5 days prior to surgery.  ____ Do Not wear your contact lenses the day of your procedure.  You may wear your glasses.        I have read or had read and explained to me, and understand the above information.  Additional comments or instructions:  For additional questions call [phone]     Take a Hibiclens shower twice a day for 3 days prior to  surgery, including the morning of surgery.   Gargle with Listerine twice a day for 2 days prior to surgery, including the morning of surgery.      Pre-Op Bathing Instructions    Before surgery, you can play an important role in your own health.    Because skin is not sterile, we need to be sure that your skin is as free of germs as possible. By following the instructions below, you can reduce the number of germs on your skin before surgery.    IMPORTANT: You will need to shower with a special soap called Hibiclens*, available at any pharmacy.  If you are allergic to Chlorhexidine (the antiseptic in Hibiclens), use an antibacterial soap such as Dial Soap for your preoperative shower.  You will shower with Hibiclens both the night before your surgery and the morning of your surgery.  Do not use Hibiclens on the head, face or genitals to avoid injury to those areas.    STEP #1: THE NIGHT BEFORE YOUR SURGERY     1. Do not shave the area of your body where your surgery will be performed.  2. Shower and wash your hair and body as usual with your normal soap and shampoo.  3. Rinse your hair and body thoroughly after you shower to remove all soap residue.  4. With your hand, apply one packet of Hibiclens soap to the surgical site.   5. Wash the site gently for five (5) minutes. Do not scrub your skin too hard.   6. Do not wash with your regular soap after Hibiclens is used.  7. Rinse your body thoroughly.  8. Pat yourself dry with a clean, soft towel.  9. Do not use lotion, cream, or powder.  10. Wear clean clothes.    STEP #2: THE MORNING OF YOUR SURGERY     1. Repeat Step #1.    * Not to be used by people allergic to Chlorhexidine.        Anesthesia: General Anesthesia  Youre due to have surgery. During surgery, youll be given medication called anesthesia. (It is also called anesthetic.) This will keep you comfortable and pain-free. Your anesthesia provider will use general anesthesia. This sheet tells you more about  it.  What is general anesthesia?     You are watched continuously during your procedure by the anesthesia provider   General anesthesia puts you into a state like deep sleep. It goes into the bloodstream (IV anesthetics), into the lungs (gas anesthetics), or both. You feel nothing during the procedure. You will not remember it. During the procedure, the anesthesia provider monitors you continuously. He or she checks your heart rate and rhythm, blood pressure, breathing, and blood oxygen.  · IV Anesthetics. IV anesthetics are given through an IV line in your arm. Theyre often given first. This is so you are asleep before a gas anesthetic is started. Some kinds of IV anesthetics relieve pain. Others relax you. Your doctor will decide which kind is best in your case.  · Gas Anesthetics. Gas anesthetics are breathed into the lungs. They are often used to keep you asleep. They can be given through a facemask or a tube placed in your larynx or trachea (breathing tube).  ¨ If you have a facemask, your anesthesia provider will most likely place it over your nose and mouth while youre still awake. Youll breathe oxygen through the mask as your IV anesthetic is started. Gas anesthetic may be added through the mask.  ¨ If you have a tube in the larynx or trachea, it will be inserted into your throat after youre asleep.  Anesthesia tools and medications  You will likely have:  · IV anesthetics. These are put into an IV line into your bloodstream.  · Gas anesthetics. You breathe these anesthetics into your lungs, where they pass into your bloodstream.  · Pulse oximeter. This is a small clip that is attached to the end of your finger. This measures your blood oxygen level.  · Electrocardiography leads (electrodes). These are small sticky pads that are placed on your chest. They record your heart rate and rhythm.  · Blood pressure cuff. This reads your blood pressure.  Risks and possible complications  General anesthesia has  some risks. These include:  · Breathing problems  · Nausea and vomiting  · Sore throat or hoarseness (usually temporary)  · Allergic reaction to the anesthetic  · Irregular heartbeat (rare)  · Cardiac arrest (rare)   Anesthesia safety  · Follow all instructions you are given for how long not to eat or drink before your procedure.  · Be sure your doctor knows what medications and drugs you take. This includes over-the-counter medications, herbs, supplements, alcohol or other drugs. You will be asked when those were last taken.  · Have an adult family member or friend drive you home after the procedure.  · For the first 24 hours after your surgery:  ¨ Do not drive or use heavy equipment.  ¨ Have a trusted family member or spouse make important decisions or sign documents.  ¨ Avoid alcohol.  ¨ Have a responsible adult stay with you. He or she can watch for problems and help keep you safe.  Date Last Reviewed: 10/16/2014  © 8160-1510 [employer]. [address] [place] [address], [place] [address]. All rights reserved. This information is not intended as a substitute for professional medical care. Always follow your healthcare professional's instructions.

## 2017-03-08 NOTE — ANESTHESIA PREPROCEDURE EVALUATION
03/08/2017     Adilia Rosas is a 81 y.o., female presented today via EMS.  Pt is scheduled for removal of left external fixation device and application of long cast under MAC/gen on 3/10/2017.    L distal femur fx s/p ORIF 12/9/2016 complicated by surgical site infection (MRSA) and subsequent I&D. Pt d/c from [facility] 1/09/2017.  Per outside PCP note, pt treated for RUE DVT at [facility] 2/15/2017.  Pt states she has been on Xarelto, but states has been off since 3/03/2017. Report requested from pt PCP.    Outside BMP 1/24/2017 reviewed and in pt chart:  Cr 1.9 (baseline Cr 1.3-2.0)  Outside PCP note 2/24/2017 in pt chart.      Past Surgical History:   Procedure Laterality Date    HERNIA REPAIR      HYSTERECTOMY      ORIF FEMUR FRACTURE Left 12/2016          [facility] Anesthesia Evaluation    I have reviewed the Patient Summary Reports.    I have reviewed the Nursing Notes.   I have reviewed the Medications.     Review of Systems  Anesthesia Hx:  No problems with previous Anesthesia  History of prior surgery of interest to airway management or planning: Previous anesthesia: General Denies Family Hx of Anesthesia complications.   Denies Personal Hx of Anesthesia complications.   Social:  Non-Smoker, No Alcohol Use    Hematology/Oncology:        Hematology Comments: On on xarelto for RUE thrombophlebitis. Pt states she stopped 3/03/2017 per PCP. Pt will resume in post-op phase per PCP         EENT/Dental:EENT/Dental Normal   Cardiovascular:   Exercise tolerance: good Hypertension Past MI (MI 2006, medical management) CAD   CHF hyperlipidemia ECG has been reviewed.  Deep Venous Thrombosis (DVT) (on eliquis, but it is currenlty on hold for procedure since 3/03/2017), Hx of DVT, recent DVT (<6 months), right upper extremity    Pulmonary:   Asthma mild and asymptomatic Denies Shortness of breath.    Renal/:    Chronic Renal Disease, CRI Baseline Cr. 1.7   Hepatic/GI:   GERD, well controlled    Musculoskeletal:   Arthritis  External fixation device to LLE; pt denies pain   Neurological:  Neurology Normal    Endocrine:  Endocrine Normal          BP Readings from Last 3 Encounters:   01/09/17 (!) 96/55   12/29/16 136/84   12/12/16 113/71         Physical Exam  General:  Malnutrition Hypoalbuminemia with baseline Alb 1.5 - 1.8    Airway/Jaw/Neck:  Airway Findings: Mouth Opening: Normal Tongue: Normal  General Airway Assessment: Adult  Mallampati: II  Improves to II with phonation.  TM Distance: Normal, at least 6 cm        Eyes/Ears/Nose:  EYES/EARS/NOSE FINDINGS: Normal   Dental:  Dental Findings: Periodontal disease, Severe    Chest/Lungs:  Chest/Lungs Clear    Heart/Vascular:  Heart Findings: Normal Heart murmur: negative    Abdomen:  Abdomen Findings: Normal    Musculoskeletal:  Musculoskeletal Findings: (external fixation device LLE, neurovasc intact with good pedal pulse)    Skin:  Skin Findings: (dry)  Decreased Turgor     Mental Status:  Mental Status Findings: Normal      EKG 2016-12-07  Sinus bradycardia with sinus arrhythmia  Possible Inferior infarct (cited on or before 11-MAY-2006)  Abnormal ECG  When compared with ECG of 19-SEP-2016 06:17,  Nonspecific T wave abnormality, improved in Anterior leads  Confirmed by Gus ALBA, [facility] ([id number]) on 12/8/2016 6:53:18 PM    ECHO 2016-09-19    1 - Mil d left atrial enlargement.     2 - Normal left ventricular systolic function (EF 55-60%).     3 - Normal right ventricular systolic function .     4 - The estimated PA systolic pressure is 38 mmHg.     5 - Trivial aortic regurgitation.     6 - Mild mitral regurgitation.     7 - Trivial tricuspid regurgitation.     8 - Grade 1 diastolic dysfunction .   This document has been electronically    SIGNED BY: Luke Hackett MD On: 09/19/2016 13:28    Anesthesia Plan  Type of Anesthesia, risks & benefits discussed:  Anesthesia  Type:  general, MAC  Patient's Preference:   Intra-op Monitoring Plan: arterial line and central line  Intra-op Monitoring Plan Comments:   Post Op Pain Control Plan:   Post Op Pain Control Plan Comments:   Induction:   IV  Beta Blocker:  Patient is on a Beta-Blocker and has received one dose within the past 24 hours (No further documentation required).       Informed Consent: Patient understands risks and agrees with Anesthesia plan.  Questions answered.   ASA Score: 3     Day of Surgery Review of History & Physical:        Anesthesia Plan Notes: Anesthesia consent will be obtained prior to procedure on 3/10/2017.    Outside BMP 1/24/2017 reviewed and in pt chart:  Cr 1.9 (baseline Cr 1.3-2.0)  Outside PCP note 2/24/2017 in pt chart.        Ready For Surgery From Anesthesia Perspective.

## 2017-03-08 NOTE — IP AVS SNAPSHOT
[facility]  [address]  [place] [address]  Phone: [phone]           Patient Discharge Instructions    Our goal is to set you up for success. This packet includes information on your condition, medications, and your home care. It will help you to care for yourself so you don't get sicker.     Please ask your nurse if you have any questions.        There are many details to remember when preparing for your surgery. Here is what you will need to do, please ask your nurse if there are more specific instructions and if you have any questions:    1. 24 hours before procedure Do not smoke or drink alcoholic beverages 24 hours prior to your procedure    2. Eating before procedure Do not eat or drink anything 8 hours before your procedure - this includes gum, mints, and candy.     3. Day of procedure Please remove all jewelry for the procedure. If you wear contact lenses, dentures, hearing aids or glasses, bring a container to put them in during your surgery and give to a family member for safekeeping.  If your doctor has scheduled you for an overnight stay, bring a small overnight bag with any personal items that you need.    4. After procedure Make arrangements in advance for transportation home by a responsible adult. It is not safe to drive a vehicle during the 24 hours following surgery.     PLEASE NOTE: You may be contacted the day before your surgery to confirm your surgery date and arrival time. The Surgery schedule has many variables which may affect the time of your surgery case. Family members should be available if your surgery time changes.                ** Verify the list of medication(s) below is accurate and up to date. Carry this with you in case of emergency. If your medications have changed, please notify your healthcare provider.             Medication List      TAKE these medications        Additional Info                      albuterol 90 mcg/actuation inhaler   Refills:  0    Dose:  2 puff    Instructions:  Inhale 2 puffs into the lungs every 4 (four) hours as needed for Wheezing.     Begin Date    AM    Noon    PM    Bedtime       amlodipine 10 MG tablet   Commonly known as:  NORVASC   Refills:  0   Dose:  10 mg    Instructions:  Take 10 mg by mouth every evening.     Begin Date    AM    Noon    PM    Bedtime       atorvastatin 80 MG tablet   Commonly known as:  LIPITOR   Refills:  0   Dose:  80 mg    Instructions:  Take 80 mg by mouth once daily.     Begin Date    AM    Noon    PM    Bedtime       benazepril 20 MG tablet   Commonly known as:  LOTENSIN   Refills:  0   Dose:  20 mg    Instructions:  Take 20 mg by mouth once daily.     Begin Date    AM    Noon    PM    Bedtime       bisacodyl 10 mg Supp   Commonly known as:  DULCOLAX   Refills:  0   Dose:  10 mg    Instructions:  Place 1 suppository (10 mg total) rectally daily as needed (Until bowel movement if patient has no bowel movement for 2 days).     Begin Date    AM    Noon    PM    Bedtime       clopidogrel 75 mg tablet   Commonly known as:  PLAVIX   Refills:  0   Dose:  75 mg    Instructions:  Take 75 mg by mouth once daily.     Begin Date    AM    Noon    PM    Bedtime       ferrous gluconate 325 MG Tab   Commonly known as:  FERGON   Refills:  0   Dose:  325 mg    Instructions:  Take 325 mg by mouth daily with breakfast.     Begin Date    AM    Noon    PM    Bedtime       hydrocodone-acetaminophen 5-325mg 5-325 mg per tablet   Commonly known as:  NORCO   Quantity:  50 tablet   Refills:  0   Dose:  2 tablet    Instructions:  Take 2 tablets by mouth every 4 (four) hours as needed.     Begin Date    AM    Noon    PM    Bedtime       lubiprostone 24 MCG Cap   Commonly known as:  AMITIZA   Refills:  0   Dose:  24 mcg    Instructions:  Take 24 mcg by mouth 2 (two) times daily with meals.     Begin Date    AM    Noon    PM    Bedtime       magnesium 200 mg Tab   Refills:  0   Dose:  400 mg    Instructions:  Take 400 mg by mouth 2  (two) times daily.     Begin Date    AM    Noon    PM    Bedtime       metoprolol tartrate 50 MG tablet   Commonly known as:  LOPRESSOR   Refills:  0   Dose:  50 mg    Instructions:  Take 50 mg by mouth 2 (two) times daily.     Begin Date    AM    Noon    PM    Bedtime       mirtazapine 15 MG tablet   Commonly known as:  REMERON   Refills:  0   Dose:  15 mg    Instructions:  Take 15 mg by mouth every evening.     Begin Date    AM    Noon    PM    Bedtime       pantoprazole 40 MG tablet   Commonly known as:  PROTONIX   Refills:  0   Dose:  40 mg    Instructions:  Take 40 mg by mouth 2 (two) times daily.     Begin Date    AM    Noon    PM    Bedtime       prednisoLONE acetate 1 % Drps   Commonly known as:  PRED FORTE   Refills:  0   Dose:  1 drop    Instructions:  1 drop 4 (four) times daily.     Begin Date    AM    Noon    PM    Bedtime       senna-docusate 8.6-50 mg 8.6-50 mg per tablet   Commonly known as:  PERICOLACE   Refills:  0   Dose:  1 tablet    Instructions:  Take 1 tablet by mouth once daily.     Begin Date    AM    Noon    PM    Bedtime       simethicone 180 mg Cap   Refills:  0   Dose:  180 mg    Instructions:  Take 180 mg by mouth 4 (four) times daily as needed (gas pain).     Begin Date    AM    Noon    PM    Bedtime       VANCOMYCIN 1 G/250 ML D5W (READY TO MIX SYSTEM)   Refills:  0   Dose:  1 g   Indications:  Bone/Joint    Instructions:  Inject 250 mLs (1 g total) into the vein once daily.     Begin Date    AM    Noon    PM    Bedtime       VITAMIN C 500 MG tablet   Refills:  0   Dose:  500 mg   Generic drug:  ascorbic acid (vitamin C)    Instructions:  Take 500 mg by mouth once daily.     Begin Date    AM    Noon    PM    Bedtime       vitamin D 1000 units Tab   Refills:  0   Dose:  370 mg    Instructions:  Take 370 mg by mouth once daily.     Begin Date    AM    Noon    PM    Bedtime                  Please bring to all follow up appointments:    1. A copy of your discharge instructions.  2. All  medicines you are currently taking in their original bottles.  3. Identification and insurance card.    Please arrive 15 minutes ahead of scheduled appointment time.    Please call 24 hours in advance if you must reschedule your appointment and/or time.        Your Future Surgeries/Procedures     Mar 10, 2017   Surgery with Jose Antonio Franklin MD   [facility]    [address] [place] [address]   [phone]                  Discharge Instructions       Your surgery is scheduled for 3/10/17.    Please report to Outpatient Surgery Intake Office on the 2nd FLOOR at 5:30a.m.          INSTRUCTIONS IMPORTANT!!!  ¨ Do not eat or drink after 12 midnight-including water. OK to brush teeth, no   gum, candy or mints!    ¨ Take only these medicines with a small swallow of water-morning of surgery: Blood pressure medications      ____  Do not wear makeup, including mascara.  ____  No powder, lotions or creams to surgical area.  ____  Please remove all jewelry, including piercings and leave at home.  ____  No money or valuables needed. Please leave at home.  ____  Please bring any documents given by your doctor.  ____  If going home the same day, arrange for a ride home. You will not be able to             drive if Anesthesia was used.  ____  Children under 18 years require a parent / guardian present the entire time             they are in surgery / recovery.  ____  Wear loose fitting clothing. Allow for dressings, bandages.  ____  Stop Aspirin, Ibuprofen, Motrin and Aleve at least 3-5 days before surgery, unless otherwise instructed by your doctor, or the nurse.   You MAY use Tylenol/acetaminophen until day of surgery.  ____  Wash the surgical area with Hibiclens the night before surgery, and again the             morning of surgery.  Be sure to rinse hibiclens off completely (if instructed by   nurse).  ____  If you take diabetic medication, do not take am of surgery unless  instructed by Doctor.  ____  Call MD for temperature above 101 degrees.  ____ Stop taking any Fish Oil supplement or any Vitamins that contain Vitamin E at least 5 days prior to surgery.  ____ Do Not wear your contact lenses the day of your procedure.  You may wear your glasses.        I have read or had read and explained to me, and understand the above information.  Additional comments or instructions:  For additional questions call [phone]     Take a Hibiclens shower twice a day for 3 days prior to surgery, including the morning of surgery.   Gargle with Listerine twice a day for 2 days prior to surgery, including the morning of surgery.      Pre-Op Bathing Instructions    Before surgery, you can play an important role in your own health.    Because skin is not sterile, we need to be sure that your skin is as free of germs as possible. By following the instructions below, you can reduce the number of germs on your skin before surgery.    IMPORTANT: You will need to shower with a special soap called Hibiclens*, available at any pharmacy.  If you are allergic to Chlorhexidine (the antiseptic in Hibiclens), use an antibacterial soap such as Dial Soap for your preoperative shower.  You will shower with Hibiclens both the night before your surgery and the morning of your surgery.  Do not use Hibiclens on the head, face or genitals to avoid injury to those areas.    STEP #1: THE NIGHT BEFORE YOUR SURGERY     1. Do not shave the area of your body where your surgery will be performed.  2. Shower and wash your hair and body as usual with your normal soap and shampoo.  3. Rinse your hair and body thoroughly after you shower to remove all soap residue.  4. With your hand, apply one packet of Hibiclens soap to the surgical site.   5. Wash the site gently for five (5) minutes. Do not scrub your skin too hard.   6. Do not wash with your regular soap after Hibiclens is used.  7. Rinse your body thoroughly.  8. Pat yourself  dry with a clean, soft towel.  9. Do not use lotion, cream, or powder.  10. Wear clean clothes.    STEP #2: THE MORNING OF YOUR SURGERY     1. Repeat Step #1.    * Not to be used by people allergic to Chlorhexidine.        Anesthesia: General Anesthesia  Youre due to have surgery. During surgery, youll be given medication called anesthesia. (It is also called anesthetic.) This will keep you comfortable and pain-free. Your anesthesia provider will use general anesthesia. This sheet tells you more about it.  What is general anesthesia?     You are watched continuously during your procedure by the anesthesia provider   General anesthesia puts you into a state like deep sleep. It goes into the bloodstream (IV anesthetics), into the lungs (gas anesthetics), or both. You feel nothing during the procedure. You will not remember it. During the procedure, the anesthesia provider monitors you continuously. He or she checks your heart rate and rhythm, blood pressure, breathing, and blood oxygen.  · IV Anesthetics. IV anesthetics are given through an IV line in your arm. Theyre often given first. This is so you are asleep before a gas anesthetic is started. Some kinds of IV anesthetics relieve pain. Others relax you. Your doctor will decide which kind is best in your case.  · Gas Anesthetics. Gas anesthetics are breathed into the lungs. They are often used to keep you asleep. They can be given through a facemask or a tube placed in your larynx or trachea (breathing tube).  ¨ If you have a facemask, your anesthesia provider will most likely place it over your nose and mouth while youre still awake. Youll breathe oxygen through the mask as your IV anesthetic is started. Gas anesthetic may be added through the mask.  ¨ If you have a tube in the larynx or trachea, it will be inserted into your throat after youre asleep.  Anesthesia tools and medications  You will likely have:  · IV anesthetics. These are put into an IV line  into your bloodstream.  · Gas anesthetics. You breathe these anesthetics into your lungs, where they pass into your bloodstream.  · Pulse oximeter. This is a small clip that is attached to the end of your finger. This measures your blood oxygen level.  · Electrocardiography leads (electrodes). These are small sticky pads that are placed on your chest. They record your heart rate and rhythm.  · Blood pressure cuff. This reads your blood pressure.  Risks and possible complications  General anesthesia has some risks. These include:  · Breathing problems  · Nausea and vomiting  · Sore throat or hoarseness (usually temporary)  · Allergic reaction to the anesthetic  · Irregular heartbeat (rare)  · Cardiac arrest (rare)   Anesthesia safety  · Follow all instructions you are given for how long not to eat or drink before your procedure.  · Be sure your doctor knows what medications and drugs you take. This includes over-the-counter medications, herbs, supplements, alcohol or other drugs. You will be asked when those were last taken.  · Have an adult family member or friend drive you home after the procedure.  · For the first 24 hours after your surgery:  ¨ Do not drive or use heavy equipment.  ¨ Have a trusted family member or spouse make important decisions or sign documents.  ¨ Avoid alcohol.  ¨ Have a responsible adult stay with you. He or she can watch for problems and help keep you safe.  Date Last Reviewed: 10/16/2014  © 1862-2091 [employer]. [address] [place] [address]. All rights reserved. This information is not intended as a substitute for professional medical care. Always follow your healthcare professional's instructions.            Admission Information     Date & Time Provider Department CSN    3/8/2017 10:00 AM Jose Antonio Franklin MD [facility] [id number]      Care Providers     Provider Role Specialty Primary office phone    Jose Antonio Franklin MD Attending  Provider Orthopedic Surgery [phone]      Your Vitals Were     Last Period                   (LMP Unknown)           Recent Lab Values     No lab values to display.      Allergies as of 3/8/2017        Reactions    Celebrex [Celecoxib] Other (See Comments)    Pain down her spine      [facility]     [facility] Nurse Care Line - 24/7 Assistance  Unless otherwise directed by your provider, please contact [facility], our nurse care line that is available for 24/7 assistance.     Registered nurses in the [facility] provide clinical advisement, health education, appointment booking, and other advisory services.  Call for this free service at [phone].        Advance Directives     An advance directive is a document which, in the event you are no longer able to make decisions for yourself, tells your healthcare team what kind of treatment you do or do not want to receive, or who you would like to make those decisions for you.  If you do not currently have an advance directive, [facility] encourages you to create one.  For more information call:  [phone]), [phone] ([phone]),  or log on to [email or web address].        Language Assistance Services     ATTENTION: Language assistance services are available, free of charge. Please call [phone].      ATENCIÓN: Si habla español, tiene a finley disposición servicios gratuitos de asistencia lingüística. Llame al [phone].     CHÚ Ý: N?u b?n nói Ti?ng Vi?t, có các d?ch v? h? tr? ngôn ng? mi?n phí dành cho b?n. G?i s? [phone].        Heart Failure Education       Heart Failure: Being Active  You have a condition called heart failure. Being active doesnt mean that you have to wear yourself out. Even a little movement each day helps to strengthen your heart. If you cant get out to exercise, you can do simple stretching and strengthening exercises at home. These are good ways to keep you well-conditioned  and prevent you and your heart from becoming excessively weak.    Ideas to get you started  · Add a little movement to things you do now. Walk to mail letters. Park your car at the far end of the parking lot and walk to the store. Walk up a flight of stairs instead of taking the elevator.  · Choose activities you enjoy. You might walk, swim, or ride an exercise bike. Things like gardening and washing the car count, too. Other possibilities include: washing dishes, walking the dog, walking around the mall, and doing aerobic activities with friends.  · Join a group exercise program at a [facility] or [facility], a senior center, or a community center. Or look into a hospital cardiac rehabilitation program. Ask your doctor if you qualify.  Tips to keep you going  · Get up and get dressed each day. Go to a coffee shop and read a newspaper or go somewhere that you'll be in the presence of other active people. Youll feel more like being active.  · Make a plan. Choose one or more activities that you enjoy and that you can easily do. Then plan to do at least one each day. You might write your plan on a calendar.  · Go with a friend or a group if you like company. This can help you feel supported and stay motivated, too.  · Plan social events that you enjoy. This will keep you mentally engaged as well as physically motivated to do things you find pleasure in.  For your safety  · Talk with your healthcare provider before starting an exercise program.  · Exercise indoors when its too hot or too cold outside, or when the air quality is poor. Try walking at a shopping mall.  · Wear socks and sturdy shoes to maintain your balance and prevent falls.  · Start slowly. Do a few minutes several times a day at first. Increase your time and speed little by little.  · Stop and rest whenever you feel tired or get short of breath.  · Dont push yourself on days when you dont feel well.  Date Last Reviewed: 3/20/2016  © 9538-4799 [employer]. [address] [place] [address]. All rights reserved. This information is not intended as a substitute for professional medical care. Always follow your healthcare professional's instructions.              Heart Failure: Evaluating Your Heart  You have a condition called heart failure. To evaluate your condition, your doctor will examine you, ask questions, and do some tests. Along with looking for signs of heart failure, the doctor looks for any other health problems that may have led to heart failure. The results of your evaluation will help your doctor form a treatment plan.  Health history and physical exam  Your visit will start with a health history. Tell the doctor about any symptoms youve noticed and about all medicines you take. Then youll have a physical exam. This includes listening to your heartbeat and breathing. Youll also be checked for swelling (edema) in your legs and neck. When you have fluid buildup or fluid in the lungs, it may be called congestive heart failure.  Diagnosing heart failure     During an echocardiogram, sound waves bounce off the heart. These are converted into a picture on the screen.   The following may be done to help your doctor form a diagnosis:  · X-rays show the size and shape of your heart. These pictures can also show fluid in your lungs.  · An electrocardiogram (ECG or EKG) shows the pattern of your heartbeat. Small pads (electrodes) are placed on your chest, arms, and legs. Wires connect the pads to the ECG machine, which records your hearts electrical signals. This can give the doctor information about heart function.  · An echocardiogram uses ultrasound waves to show the structure and movement of your heart muscle. This shows how well the heart pumps. It also shows the thickness of the heart walls, and if the heart is enlarged. It is one of the most useful, non-invasive tests as it provides information about the heart's general function.  This helps your doctor make treatment decisions.  · Lab tests evaluate small amounts of blood or urine for signs of problems. A BNP lab test can help diagnose and evaluate heart failure. BNP stands for B-type natriuretic peptide. The ventricles secrete more BNP when heart failure worsens. Lab tests can also provide information about metabolic dysfunction or heart dysfunction.  Your treatment plan  Based on the results of your evaluation and tests, your doctor will develop a treatment plan. This plan is designed to relieve some of your heart failure symptoms and help make you more comfortable. Your treatment plan may include:  · Medicine to help your heart work better and improve your quality of life  · Changes in what you eat and drink to help prevent fluid from backing up in your body  · Daily monitoring of your weight and heart failure symptoms to see how well your treatment plan is working  · Exercise to help you stay healthy  · Help with quitting smoking  · Emotional and psychological support to help adjust to the changes  · Referrals to other specialists to make sure you are being treated comprehensively  Date Last Reviewed: 3/21/2016  © 3372-6462 [employer]. [address] [place] [address]. All rights reserved. This information is not intended as a substitute for professional medical care. Always follow your healthcare professional's instructions.              Heart Failure: Making Changes to Your Diet  You have a condition called heart failure. When you have heart failure, excess fluid is more likely to build up in your body because your heart isn't working well. This makes the heart work harder to pump blood. Fluid buildup causes symptoms such as shortness of breath and swelling (edema). This is often referred to as congestive heart failure or CHF. Controlling the amount of salt (sodium) you eat may help stop fluid from building up. Your doctor may also tell you to reduce the amount  of fluid you drink.  Reading food labels    Your healthcare provider will tell you how much sodium you can eat each day. Read food labels to keep track. Keep in mind that certain foods are high in salt. These include canned, frozen, and processed foods. Check the amount of sodium in each serving. Watch out for high-sodium ingredients. These include MSG (monosodium glutamate), baking soda, and sodium phosphate.   Eating less salt  Give yourself time to get used to eating less salt. It may take a little while. Here are some tips to help:  · Take the saltshaker off the table. Replace it with salt-free herb mixes and spices.  · Eat fresh or plain frozen vegetables. These have much less salt than canned vegetables.  · Choose low-sodium snacks like sodium-free pretzels, crackers, or air-popped popcorn.  · Dont add salt to your food when youre cooking. Instead, season your foods with pepper, lemon, garlic, or onion.  · When you eat out, ask that your food be cooked without added salt.  · Avoid eating fried foods as these often have a great deal of salt.  If youre told to limit fluids  You may need to limit how much fluid you have to help prevent swelling. This includes anything that is liquid at room temperature, such as ice cream and soup. If your doctor tells you to limit fluid, try these tips:  · Measure drinks in a measuring cup before you drink them. This will help you meet daily goals.  · Chill drinks to make them more refreshing.  · Suck on frozen lemon wedges to quench thirst.  · Only drink when youre thirsty.  · Chew sugarless gum or suck on hard candy to keep your mouth moist.  · Weigh yourself daily to know if your body's fluid content is rising.  My sodium goal  Your healthcare provider may give you a sodium goal to meet each day. This includes sodium found in food as well as salt that you add. My goal is to eat no more than ___________ mg of sodium per day.     When to call your doctor  Call your doctor  right away if you have any symptoms of worsening heart failure. These can include:  · Sudden weight gain  · Increased swelling of your legs or ankles  · Trouble breathing when youre resting or at night  · Increase in the number of pillows you have to sleep on  · Chest pain, pressure, discomfort, or pain in the jaw, neck, or back   Date Last Reviewed: 3/21/2016  © 0172-7126 [employer]. [address] [place] [address], [place] [address]. All rights reserved. This information is not intended as a substitute for professional medical care. Always follow your healthcare professional's instructions.              Heart Failure: Medicines to Help Your Heart    You have a condition called heart failure (also known as congestive heart failure, or CHF). Your doctor will likely prescribe medicines for heart failure and any underlying health problems you have. Most heart failure patients take one or more types of medicinen. Your healthcare provider will work to find the combination of medicines that works best for you.  Heart failure medicines  Here are the most common heart failure medicines:  · ACE inhibitors lower blood pressure and decrease strain on the heart. This makes it easier for the heart to pump. Angiotensin receptor blockers have similar effects. These are prescribed for some patients instead of ACE inhibitors.  · Beta-blockers relieve stress on the heart. They also improve symptoms. They may also improve the heart's pumping action over time.  · Diuretics (also called water pills) help rid your body of excess water. This can help rid your body of swelling (edema). Having less fluid to pump means your heart doesnt have to work as hard. Some diuretics make your body lose a mineral called potassium. Your doctor will tell you if you need to take supplements or eat more foods high in potassium.  · Digoxin helps your heart pump with more strength. This helps your heart pump more blood with each beat. So, more  oxygen-rich blood travels to the rest of the body.  · Aldosterone antagonists help alter hormones and decrease strain on the heart.  · Hydralazine and nitrates are two separate medicines used together to treat heart failure. They may come in one combination pill. They lower blood pressure and decrease how hard the heart has to pump.  Medicines for related conditions  Controlling other heart problems helps keep heart failure under control, too. Depending on other heart problems you have, medicines may be prescribed to:  · Lower blood pressure (antihypertensives).  · Lower cholesterol levels (statins).  · Prevent blood clots (anticoagulants or aspirin).  · Keep the heartbeat steady (antiarrhythmics).  Date Last Reviewed: 3/5/2016  © 1702-2874 [employer]. [address] [place] [address], [place] [address]. All rights reserved. This information is not intended as a substitute for professional medical care. Always follow your healthcare professional's instructions.              Heart Failure: Procedures That May Help    The heart is a muscle that pumps oxygen-rich blood to all parts of the body. When you have heart failure, the heart is not able to pump as well as it should. Blood and fluid may back up into the lungs (congestive heart failure), and some parts of the body dont get enough oxygen-rich blood to work normally. These problems lead to the symptoms of heart failure.     Certain procedures may help the heart pump better in some cases of heart failure. Some procedures are done to treat health problems that may have caused the heart failure such as coronary artery disease or heart rhythm problems. For more serious heart failure, other options are available.  Treating artery and valve problems  If you have coronary artery disease or valve disease, procedures may be done to improve blood flow. This helps the heart pump better, which can improve heart failure symptoms. First, your doctor may do a cardiac  catheterization to help detect clogged blood vessels or valve damage. During this procedure, a  thin tube (catheter) in inserted into a blood vessel and guided to the heart. There a dye is injected and a special type of X-ray (angiogram) is taken of the blood vessels. Procedures to open a blocked artery or fix damaged valves can also be done using catheterization.  · Angioplasty uses a balloon-tipped instrument at the end of the catheter. The balloon is inflated to widen the narrowed artery. In many cases, a stent is expanded to further support the narrowed artery. A stent is a metal mesh tube.  · Valve surgery repairs or replacement of faulty valves can also be done during catheterization so blood can flow properly through the chambers of the heart.  Bypass surgery is another option to help treat blocked arteries. It uses a healthy blood vessel from elsewhere in the body. The healthy blood vessel is attached above and below the blocked area so that blood can flow around the blocked artery.  Treating heart rhythm problems  A device may be placed in the chest to help a weak heart maintain a healthy, heartbeat so the heart can pump more effectively:  · Pacemaker. A pacemaker is an implanted device that regulates your heartbeat electronically. It monitors your heart's rhythm and generates a painless electric impulse that helps the heart beat in a regular rhythm. A pacemaker is programmed to meet your specific heart rhythm needs.  · Biventricular pacing/cardiac resynchronization therapy. A type of pacemaker that paces both pumping chambers of the heart at the same time to coordinate contractions and to improve the heart's function. Some people with heart failure are candidates for this therapy.  · Implantable cardioverter defibrillator. A device similar to a pacemaker that senses when the heart is beating too fast and delivers an electrical shock to convert the fast rhythm to a normal rhythm. This can be a life saving  device.  In severe cases  In more serious cases of heart failure when other treatments no longer work, other options may include:  · Ventricular assist devices (VADs). These are mechanical devices used to take over the pumping function for one or both of the heart's ventricles, or pumping chambers. A VAD may be necessary when heart failure progresses to the point that medicines and other treatments no longer help. In some cases, a VAD may be used as a bridge to transplant.  · Heart transplant. This is replacing the diseased heart with a healthy one from a donor. This is an option for a few people who are very sick. A heart transplant is very serious and not an option for all patients. Your doctor can tell you more.  Date Last Reviewed: 3/20/2016  © 5159-1784 [employer]. [address] [place] [address], [place] [address]. All rights reserved. This information is not intended as a substitute for professional medical care. Always follow your healthcare professional's instructions.              Heart Failure: Tracking Your Weight  You have a condition called heart failure. When you have heart failure, a sudden weight gain or a steady rise in weight is a warning sign that your body is retaining too much water and salt. This could mean your heart failure is getting worse. If left untreated, it can cause problems for your lungs and result in shortness of breath. Weighing yourself each day is the best way to know if youre retaining water. If your weight goes up quickly, call your doctor. You will be given instructions on how to get rid of the excess water. You will likely need medicines and to avoid salt. This will help your heart work better.  Call your doctor if you gain more than 2 pounds in 1 day, more than 5 pounds in 1 week, or whatever weight gain you were told to report by your doctor. This is often a sign of worsening heart failure and needs to be evaluated and treated. Your doctor will tell you what to do  next.   Tips for weighing yourself    · Weigh yourself at the same time each morning, wearing the same clothes. Weigh yourself after urinating and before eating.  · Use the same scale each day. Make sure the numbers are easy to read. Put the scale on a flat, hard surface -- not on a rug or carpet.  · Do not stop weighing yourself. If you forget one day, weigh again the next morning.  How to use your weight chart  · Keep your weight chart near the scale. Write your weight on the chart as soon as you get off the scale.  · Fill in the month and the start date on the chart. Then write down your weight each day. Your chart will look like this:    · If you miss a day, leave the space blank. Weigh yourself the next day and write your weight in the next space.  · Take your weight chart with you when you go to see your doctor.  Date Last Reviewed: 3/20/2016  © 9780-9166 [employer]. [address] [place] [address]. All rights reserved. This information is not intended as a substitute for professional medical care. Always follow your healthcare professional's instructions.              Heart Failure: Warning Signs of a Flare-Up  You have a condition called heart failure. Once you have heart failure, flare-ups can happen. Below are signs that can mean your heart failure is getting worse. If you notice any of these warning signs, call your healthcare provider.  Swelling    · Your feet, ankles, or lower legs get puffier.  · You notice skin changes on your lower legs.  · Your shoes feel too tight.  · Your clothes are tighter in the waist.  · You have trouble getting rings on or off your fingers.  Shortness of breath  · You have to breathe harder even when youre doing your normal activities or when youre resting.  · You are short of breath walking up stairs or even short distances.  · You wake up at night short of breath or coughing.  · You need to use more pillows or sit up to sleep.  · You wake up tired  or restless.  Other warning signs  · You feel weaker, dizzy, or more tired.  · You have chest pain or changes in your heartbeat.  · You have a cough that wont go away.  · You cant remember things or dont feel like eating.  Tracking your weight  Gaining weight is often the first warning sign that heart failure is getting worse. Gaining even a few pounds can be a sign that your body is retaining excess water and salt. Weighing yourself each day in the morning after you urinate and before you eat, is the best way to know if you're retaining water. Get a scale that is easy to read and make sure you wear the same clothes and use the same scale every time you weigh. Your healthcare provider will show you how to track your weight. Call your doctor if you gain more than 2 pounds in 1 day, 5 pounds in 1 week, or whatever weight gain you were told to report by your doctor. This is often a sign of worsening heart failure and needs to be evaluated and treated before it compromises your breathing. Your doctor will tell you what to do next.    Date Last Reviewed: 3/15/2016  © 5564-0907 [employer]. [address] [place] [address]. All rights reserved. This information is not intended as a substitute for professional medical care. Always follow your healthcare professional's instructions.              Chronic [facility] Disease Education             [facility] Sign-Up     Activating your [facility] account is as easy as 1-2-3!     1) Visit [email or web address], select Sign Up Now, enter this activation code and your date of birth, then select Next.  [email or web address]  Expires: 4/22/2017 10:15 AM      2) Create a username and password to use when you visit [facility] in the future and select a security question in case you lose your password and select Next.    3) Enter your e-mail address and click Sign Up!    Additional Information  If you have questions, please e-mail [email or web address] or call [phone] to  talk to our [facility] staff. Remember, [facility] is NOT to be used for urgent needs. For medical emergencies, dial 911.          [facility] complies with applicable Federal civil rights laws and does not discriminate on the basis of race, color, national origin, age, disability, or sex.

## 2017-03-10 ENCOUNTER — SURGERY (OUTPATIENT)
Age: 82
End: 2017-03-10

## 2017-03-10 ENCOUNTER — ANESTHESIA (OUTPATIENT)
Dept: SURGERY | Facility: HOSPITAL | Age: 82
End: 2017-03-10
Payer: MEDICARE

## 2017-03-10 ENCOUNTER — HOSPITAL ENCOUNTER (OUTPATIENT)
Facility: HOSPITAL | Age: 82
Discharge: HOME | End: 2017-03-10
Attending: ORTHOPAEDIC SURGERY | Admitting: ORTHOPAEDIC SURGERY
Payer: MEDICARE

## 2017-03-10 DIAGNOSIS — S72.492A OTHER CLOSED FRACTURE OF DISTAL END OF LEFT FEMUR, INITIAL ENCOUNTER: Primary | ICD-10-CM

## 2017-03-10 DIAGNOSIS — Z01.818 PRE-OP TESTING: ICD-10-CM

## 2017-03-10 LAB
ANION GAP SERPL CALC-SCNC: 5 MMOL/L
BASOPHILS # BLD AUTO: 0.01 K/UL
BASOPHILS NFR BLD: 0.1 %
BUN SERPL-MCNC: 27 MG/DL
CALCIUM SERPL-MCNC: 10.8 MG/DL
CHLORIDE SERPL-SCNC: 109 MMOL/L
CO2 SERPL-SCNC: 24 MMOL/L
CREAT SERPL-MCNC: 1.9 MG/DL
CRP SERPL-MCNC: 28 MG/L
DIFFERENTIAL METHOD: ABNORMAL
EOSINOPHIL # BLD AUTO: 0.3 K/UL
EOSINOPHIL NFR BLD: 4.1 %
ERYTHROCYTE [DISTWIDTH] IN BLOOD BY AUTOMATED COUNT: 16.1 %
ERYTHROCYTE [SEDIMENTATION RATE] IN BLOOD BY WESTERGREN METHOD: 98 MM/HR
EST. GFR  (AFRICAN AMERICAN): 28 ML/MIN/1.73 M^2
EST. GFR  (NON AFRICAN AMERICAN): 25 ML/MIN/1.73 M^2
GLUCOSE SERPL-MCNC: 86 MG/DL
HCT VFR BLD AUTO: 29.8 %
HGB BLD-MCNC: 9.3 G/DL
LYMPHOCYTES # BLD AUTO: 2.4 K/UL
LYMPHOCYTES NFR BLD: 29.1 %
MCH RBC QN AUTO: 29.2 PG
MCHC RBC AUTO-ENTMCNC: 31.2 %
MCV RBC AUTO: 93 FL
MONOCYTES # BLD AUTO: 0.7 K/UL
MONOCYTES NFR BLD: 8.5 %
NEUTROPHILS # BLD AUTO: 4.8 K/UL
NEUTROPHILS NFR BLD: 58.1 %
PLATELET # BLD AUTO: 404 K/UL
PMV BLD AUTO: 9.7 FL
POTASSIUM SERPL-SCNC: 4.2 MMOL/L
RBC # BLD AUTO: 3.19 M/UL
SODIUM SERPL-SCNC: 138 MMOL/L
WBC # BLD AUTO: 8.31 K/UL

## 2017-03-10 PROCEDURE — 36415 COLL VENOUS BLD VENIPUNCTURE: CPT

## 2017-03-10 PROCEDURE — 25000003 PHARM REV CODE 250: Performed by: NURSE ANESTHETIST, CERTIFIED REGISTERED

## 2017-03-10 PROCEDURE — 80048 BASIC METABOLIC PNL TOTAL CA: CPT

## 2017-03-10 PROCEDURE — 71000015 HC POSTOP RECOV 1ST HR: Performed by: ORTHOPAEDIC SURGERY

## 2017-03-10 PROCEDURE — 37000009 HC ANESTHESIA EA ADD 15 MINS: Performed by: ORTHOPAEDIC SURGERY

## 2017-03-10 PROCEDURE — 85025 COMPLETE CBC W/AUTO DIFF WBC: CPT

## 2017-03-10 PROCEDURE — 86140 C-REACTIVE PROTEIN: CPT

## 2017-03-10 PROCEDURE — 25000003 PHARM REV CODE 250: Performed by: NURSE PRACTITIONER

## 2017-03-10 PROCEDURE — 36000706: Performed by: ORTHOPAEDIC SURGERY

## 2017-03-10 PROCEDURE — 36000707: Performed by: ORTHOPAEDIC SURGERY

## 2017-03-10 PROCEDURE — 37000008 HC ANESTHESIA 1ST 15 MINUTES: Performed by: ORTHOPAEDIC SURGERY

## 2017-03-10 PROCEDURE — 71000016 HC POSTOP RECOV ADDL HR: Performed by: ORTHOPAEDIC SURGERY

## 2017-03-10 PROCEDURE — 63600175 PHARM REV CODE 636 W HCPCS: Performed by: NURSE ANESTHETIST, CERTIFIED REGISTERED

## 2017-03-10 PROCEDURE — 85652 RBC SED RATE AUTOMATED: CPT

## 2017-03-10 RX ORDER — LIDOCAINE HYDROCHLORIDE 10 MG/ML
1 INJECTION, SOLUTION EPIDURAL; INFILTRATION; INTRACAUDAL; PERINEURAL ONCE
Status: DISCONTINUED | OUTPATIENT
Start: 2017-03-10 | End: 2017-03-10 | Stop reason: HOSPADM

## 2017-03-10 RX ORDER — LIDOCAINE HCL/PF 100 MG/5ML
SYRINGE (ML) INTRAVENOUS
Status: DISCONTINUED | OUTPATIENT
Start: 2017-03-10 | End: 2017-03-10

## 2017-03-10 RX ORDER — PROPOFOL 10 MG/ML
VIAL (ML) INTRAVENOUS CONTINUOUS PRN
Status: DISCONTINUED | OUTPATIENT
Start: 2017-03-10 | End: 2017-03-10

## 2017-03-10 RX ORDER — SODIUM CHLORIDE 9 MG/ML
INJECTION, SOLUTION INTRAVENOUS CONTINUOUS
Status: DISCONTINUED | OUTPATIENT
Start: 2017-03-10 | End: 2017-03-10 | Stop reason: HOSPADM

## 2017-03-10 RX ORDER — FENTANYL CITRATE 50 UG/ML
INJECTION, SOLUTION INTRAMUSCULAR; INTRAVENOUS
Status: DISCONTINUED | OUTPATIENT
Start: 2017-03-10 | End: 2017-03-10

## 2017-03-10 RX ORDER — OXYCODONE AND ACETAMINOPHEN 5; 325 MG/1; MG/1
1 TABLET ORAL EVERY 4 HOURS PRN
Qty: 30 TABLET | Refills: 0 | Status: ON HOLD | OUTPATIENT
Start: 2017-03-10 | End: 2018-11-12

## 2017-03-10 RX ORDER — PROPOFOL 10 MG/ML
VIAL (ML) INTRAVENOUS
Status: DISCONTINUED | OUTPATIENT
Start: 2017-03-10 | End: 2017-03-10

## 2017-03-10 RX ADMIN — SODIUM CHLORIDE 10 ML/HR: 0.9 INJECTION, SOLUTION INTRAVENOUS at 07:03

## 2017-03-10 RX ADMIN — PROPOFOL 10 MG: 10 INJECTION, EMULSION INTRAVENOUS at 07:03

## 2017-03-10 RX ADMIN — PROPOFOL 50 MCG/KG/MIN: 10 INJECTION, EMULSION INTRAVENOUS at 07:03

## 2017-03-10 RX ADMIN — FENTANYL CITRATE 50 MCG: 50 INJECTION, SOLUTION INTRAMUSCULAR; INTRAVENOUS at 07:03

## 2017-03-10 RX ADMIN — LIDOCAINE HYDROCHLORIDE 100 MG: 20 INJECTION, SOLUTION INTRAVENOUS at 07:03

## 2017-03-10 RX ADMIN — PROPOFOL 20 MG: 10 INJECTION, EMULSION INTRAVENOUS at 07:03

## 2017-03-10 NOTE — IP AVS SNAPSHOT
[facility]  [address]  [place] [address]  Phone: [phone]           Patient Discharge Instructions     Our goal is to set you up for success. This packet includes information on your condition, medications, and your home care. It will help you to care for yourself so you don't get sicker and need to go back to the hospital.     Please ask your nurse if you have any questions.        There are many details to remember when preparing to leave the hospital. Here is what you will need to do:    1. Take your medicine. If you are prescribed medications, review your Medication List in the following pages. You may have new medications to  at the pharmacy and others that you'll need to stop taking. Review the instructions for how and when to take your medications. Talk with your doctor or nurses if you are unsure of what to do.     2. Go to your follow-up appointments. Specific follow-up information is listed in the following pages. Your may be contacted by a transition nurse or clinical provider about future appointments. Be sure we have all of the phone numbers to reach you, if needed. Please contact your provider's office if you are unable to make an appointment.     3. Watch for warning signs. Your doctor or nurse will give you detailed warning signs to watch for and when to call for assistance. These instructions may also include educational information about your condition. If you experience any of warning signs to your health, call your doctor.               ** Verify the list of medication(s) below is accurate and up to date. Carry this with you in case of emergency. If your medications have changed, please notify your healthcare provider.             Medication List      START taking these medications        Additional Info                      oxycodone-acetaminophen 5-325 mg per tablet   Commonly known as:  PERCOCET   Quantity:  30 tablet   Refills:  0   Dose:  1 tablet     Instructions:  Take 1 tablet by mouth every 4 (four) hours as needed for Pain.     Begin Date    AM    Noon    PM    Bedtime         CONTINUE taking these medications        Additional Info                      albuterol 90 mcg/actuation inhaler   Refills:  0   Dose:  2 puff    Instructions:  Inhale 2 puffs into the lungs every 4 (four) hours as needed for Wheezing.     Begin Date    AM    Noon    PM    Bedtime       amlodipine 10 MG tablet   Commonly known as:  NORVASC   Refills:  0   Dose:  10 mg    Instructions:  Take 10 mg by mouth every evening.     Begin Date    AM    Noon    PM    Bedtime       atorvastatin 80 MG tablet   Commonly known as:  LIPITOR   Refills:  0   Dose:  80 mg    Instructions:  Take 80 mg by mouth once daily.     Begin Date    AM    Noon    PM    Bedtime       benazepril 20 MG tablet   Commonly known as:  LOTENSIN   Refills:  0   Dose:  20 mg    Instructions:  Take 20 mg by mouth once daily.     Begin Date    AM    Noon    PM    Bedtime       bisacodyl 10 mg Supp   Commonly known as:  DULCOLAX   Refills:  0   Dose:  10 mg    Instructions:  Place 1 suppository (10 mg total) rectally daily as needed (Until bowel movement if patient has no bowel movement for 2 days).     Begin Date    AM    Noon    PM    Bedtime       clopidogrel 75 mg tablet   Commonly known as:  PLAVIX   Refills:  0   Dose:  75 mg    Instructions:  Take 75 mg by mouth once daily.     Begin Date    AM    Noon    PM    Bedtime       ferrous gluconate 325 MG Tab   Commonly known as:  FERGON   Refills:  0   Dose:  325 mg    Instructions:  Take 325 mg by mouth daily with breakfast.     Begin Date    AM    Noon    PM    Bedtime       hydrocodone-acetaminophen 5-325mg 5-325 mg per tablet   Commonly known as:  NORCO   Quantity:  50 tablet   Refills:  0   Dose:  2 tablet    Instructions:  Take 2 tablets by mouth every 4 (four) hours as needed.     Begin Date    AM    Noon    PM    Bedtime       lubiprostone 24 MCG Cap   Commonly known  as:  AMITIZA   Refills:  0   Dose:  24 mcg    Instructions:  Take 24 mcg by mouth 2 (two) times daily with meals.     Begin Date    AM    Noon    PM    Bedtime       magnesium 200 mg Tab   Refills:  0   Dose:  400 mg    Instructions:  Take 400 mg by mouth 2 (two) times daily.     Begin Date    AM    Noon    PM    Bedtime       metoprolol tartrate 50 MG tablet   Commonly known as:  LOPRESSOR   Refills:  0   Dose:  50 mg    Instructions:  Take 50 mg by mouth 2 (two) times daily.     Begin Date    AM    Noon    PM    Bedtime       mirtazapine 15 MG tablet   Commonly known as:  REMERON   Refills:  0   Dose:  15 mg    Instructions:  Take 15 mg by mouth every evening.     Begin Date    AM    Noon    PM    Bedtime       pantoprazole 40 MG tablet   Commonly known as:  PROTONIX   Refills:  0   Dose:  40 mg    Instructions:  Take 40 mg by mouth 2 (two) times daily.     Begin Date    AM    Noon    PM    Bedtime       prednisoLONE acetate 1 % Drps   Commonly known as:  PRED FORTE   Refills:  0   Dose:  1 drop    Instructions:  1 drop 4 (four) times daily.     Begin Date    AM    Noon    PM    Bedtime       senna-docusate 8.6-50 mg 8.6-50 mg per tablet   Commonly known as:  PERICOLACE   Refills:  0   Dose:  1 tablet    Instructions:  Take 1 tablet by mouth once daily.     Begin Date    AM    Noon    PM    Bedtime       VITAMIN C 500 MG tablet   Refills:  0   Dose:  500 mg   Generic drug:  ascorbic acid (vitamin C)    Instructions:  Take 500 mg by mouth once daily.     Begin Date    AM    Noon    PM    Bedtime       vitamin D 1000 units Tab   Refills:  0   Dose:  370 mg    Instructions:  Take 370 mg by mouth once daily.     Begin Date    AM    Noon    PM    Bedtime            Where to Get Your Medications      You can get these medications from any pharmacy     Bring a paper prescription for each of these medications     oxycodone-acetaminophen 5-325 mg per tablet                  Please bring to all follow up  appointments:    1. A copy of your discharge instructions.  2. All medicines you are currently taking in their original bottles.  3. Identification and insurance card.    Please arrive 15 minutes ahead of scheduled appointment time.    Please call 24 hours in advance if you must reschedule your appointment and/or time.        Follow-up Information     Follow up with Jose Antonio Franklin MD In 2 weeks.    Specialty:  Orthopedic Surgery    Contact information:    [address]  [address]  [place] [id number] [phone]          Discharge Instructions     Future Orders    Diet general     Questions:    Total calories:      Fat restriction, if any:      Protein restriction, if any:      Na restriction, if any:      Fluid restriction:      Additional restrictions:      Diet general     Questions:    Total calories:      Fat restriction, if any:      Protein restriction, if any:      Na restriction, if any:      Fluid restriction:      Additional restrictions:      Weight bearing restrictions (specify)     Comments:    nonweight bearing left lower extremity        Discharge Instructions       DIET: You may resume your home diet. If nausea is present, increase your diet gradually with fluids and bland foods    ACTIVITY LEVEL: If you have received sedation or an anesthetic, you may feel sleepy for several hours. Rest until you are more awake. Gradually resume your normal activities    Medications: Pain medication should be taken only if needed and as directed. If antibiotics are prescribed, the medication should be taken until completed. You will be given an updated list of you medications.    No driving, alcoholic beverages or signing legal documents for next 24 hours or while taking pain medication.       CALL THE DOCTOR:    For any obvious bleeding (some dried blood over the incision is normal).      Redness, swelling, foul smell around incision or fever over 101.   Shortness of breath, Coughing up Bloody sputum, Pains  "or Swelling in your Calves .   Persistent pain or nausea not relieved by medication.    If any unusual problems or difficulties occur contact your doctor. If you cannot contact your doctor but feel your signs and symptoms warrant a physicians attention return to the emergency room.          Primary Diagnosis     Your primary diagnosis was:  Broken Leg      Admission Information     Date & Time Provider Department CSN    3/10/2017  6:05 AM Jose Antonio Franklin MD [facility] [id number]      Care Providers     Provider Role Specialty Primary office phone    Jose Antonio Franklin MD Attending Provider Orthopedic Surgery [phone]    Jose Antonio Franklin MD Surgeon  Orthopedic Surgery [phone]      Your Vitals Were     BP Pulse Temp Resp Height Weight    113/67 64 97.9 °F (36.6 °C) (Oral) 18 5' 2" (1.575 m) 68.9 kg (152 lb)    Last Period SpO2 BMI          (LMP Unknown) 96% 27.8 kg/m2        Recent Lab Values     No lab values to display.      Pending Labs     Order Current Status    Sedimentation rate, manual In process      Allergies as of 3/10/2017        Reactions    Celebrex [Celecoxib] Other (See Comments)    Pain down her spine      [facility]     [facility] Nurse Care Line - 24/7 Assistance  Unless otherwise directed by your provider, please contact [facility], our nurse care line that is available for 24/7 assistance.     Registered nurses in the [facility] provide clinical advisement, health education, appointment booking, and other advisory services.  Call for this free service at [phone].        Advance Directives     An advance directive is a document which, in the event you are no longer able to make decisions for yourself, tells your healthcare team what kind of treatment you do or do not want to receive, or who you would like to make those decisions for you.  If you do not currently have an advance directive, [facility] encourages you to create one.  " For more information call:  [phone]), [phone] ([phone]),  or log on to [email or web address].        Language Assistance Services     ATTENTION: Language assistance services are available, free of charge. Please call [phone].      ATENCIÓN: Si habla gerson, tiene a finley disposición servicios gratuitos de asistencia lingüística. Llame al [phone].     [facility] Ý: N?u b?n nói Ti?ng Vi?t, có các d?ch v? h? tr? ngôn ng? mi?n phí dành cho b?n. G?i s? [phone].        Heart Failure Education       Heart Failure: Being Active  You have a condition called heart failure. Being active doesnt mean that you have to wear yourself out. Even a little movement each day helps to strengthen your heart. If you cant get out to exercise, you can do simple stretching and strengthening exercises at home. These are good ways to keep you well-conditioned and prevent you and your heart from becoming excessively weak.    Ideas to get you started  · Add a little movement to things you do now. Walk to mail letters. Park your car at the far end of the parking lot and walk to the store. Walk up a flight of stairs instead of taking the elevator.  · Choose activities you enjoy. You might walk, swim, or ride an exercise bike. Things like gardening and washing the car count, too. Other possibilities include: washing dishes, walking the dog, walking around the mall, and doing aerobic activities with friends.  · Join a group exercise program at a [facility] or [facility], a senior center, or a community center. Or look into a hospital cardiac rehabilitation program. Ask your doctor if you qualify.  Tips to keep you going  · Get up and get dressed each day. Go to a coffee shop and read a newspaper or go somewhere that you'll be in the presence of other active people. Youll feel more like being active.  · Make a plan. Choose one or more activities that you enjoy and that you can easily do. Then plan to do at least one  each day. You might write your plan on a calendar.  · Go with a friend or a group if you like company. This can help you feel supported and stay motivated, too.  · Plan social events that you enjoy. This will keep you mentally engaged as well as physically motivated to do things you find pleasure in.  For your safety  · Talk with your healthcare provider before starting an exercise program.  · Exercise indoors when its too hot or too cold outside, or when the air quality is poor. Try walking at a shopping mall.  · Wear socks and sturdy shoes to maintain your balance and prevent falls.  · Start slowly. Do a few minutes several times a day at first. Increase your time and speed little by little.  · Stop and rest whenever you feel tired or get short of breath.  · Dont push yourself on days when you dont feel well.  Date Last Reviewed: 3/20/2016  © 0881-4232 [employer]. [address] [place] [address]. All rights reserved. This information is not intended as a substitute for professional medical care. Always follow your healthcare professional's instructions.              Heart Failure: Evaluating Your Heart  You have a condition called heart failure. To evaluate your condition, your doctor will examine you, ask questions, and do some tests. Along with looking for signs of heart failure, the doctor looks for any other health problems that may have led to heart failure. The results of your evaluation will help your doctor form a treatment plan.  Health history and physical exam  Your visit will start with a health history. Tell the doctor about any symptoms youve noticed and about all medicines you take. Then youll have a physical exam. This includes listening to your heartbeat and breathing. Youll also be checked for swelling (edema) in your legs and neck. When you have fluid buildup or fluid in the lungs, it may be called congestive heart failure.  Diagnosing heart failure     During an  echocardiogram, sound waves bounce off the heart. These are converted into a picture on the screen.   The following may be done to help your doctor form a diagnosis:  · X-rays show the size and shape of your heart. These pictures can also show fluid in your lungs.  · An electrocardiogram (ECG or EKG) shows the pattern of your heartbeat. Small pads (electrodes) are placed on your chest, arms, and legs. Wires connect the pads to the ECG machine, which records your hearts electrical signals. This can give the doctor information about heart function.  · An echocardiogram uses ultrasound waves to show the structure and movement of your heart muscle. This shows how well the heart pumps. It also shows the thickness of the heart walls, and if the heart is enlarged. It is one of the most useful, non-invasive tests as it provides information about the heart's general function. This helps your doctor make treatment decisions.  · Lab tests evaluate small amounts of blood or urine for signs of problems. A BNP lab test can help diagnose and evaluate heart failure. BNP stands for B-type natriuretic peptide. The ventricles secrete more BNP when heart failure worsens. Lab tests can also provide information about metabolic dysfunction or heart dysfunction.  Your treatment plan  Based on the results of your evaluation and tests, your doctor will develop a treatment plan. This plan is designed to relieve some of your heart failure symptoms and help make you more comfortable. Your treatment plan may include:  · Medicine to help your heart work better and improve your quality of life  · Changes in what you eat and drink to help prevent fluid from backing up in your body  · Daily monitoring of your weight and heart failure symptoms to see how well your treatment plan is working  · Exercise to help you stay healthy  · Help with quitting smoking  · Emotional and psychological support to help adjust to the changes  · Referrals to other  specialists to make sure you are being treated comprehensively  Date Last Reviewed: 3/21/2016  © 2801-4701 [employer]. [address] [place] [address], [place] [address]. All rights reserved. This information is not intended as a substitute for professional medical care. Always follow your healthcare professional's instructions.              Heart Failure: Making Changes to Your Diet  You have a condition called heart failure. When you have heart failure, excess fluid is more likely to build up in your body because your heart isn't working well. This makes the heart work harder to pump blood. Fluid buildup causes symptoms such as shortness of breath and swelling (edema). This is often referred to as congestive heart failure or CHF. Controlling the amount of salt (sodium) you eat may help stop fluid from building up. Your doctor may also tell you to reduce the amount of fluid you drink.  Reading food labels    Your healthcare provider will tell you how much sodium you can eat each day. Read food labels to keep track. Keep in mind that certain foods are high in salt. These include canned, frozen, and processed foods. Check the amount of sodium in each serving. Watch out for high-sodium ingredients. These include MSG (monosodium glutamate), baking soda, and sodium phosphate.   Eating less salt  Give yourself time to get used to eating less salt. It may take a little while. Here are some tips to help:  · Take the saltshaker off the table. Replace it with salt-free herb mixes and spices.  · Eat fresh or plain frozen vegetables. These have much less salt than canned vegetables.  · Choose low-sodium snacks like sodium-free pretzels, crackers, or air-popped popcorn.  · Dont add salt to your food when youre cooking. Instead, season your foods with pepper, lemon, garlic, or onion.  · When you eat out, ask that your food be cooked without added salt.  · Avoid eating fried foods as these often have a great deal of  salt.  If youre told to limit fluids  You may need to limit how much fluid you have to help prevent swelling. This includes anything that is liquid at room temperature, such as ice cream and soup. If your doctor tells you to limit fluid, try these tips:  · Measure drinks in a measuring cup before you drink them. This will help you meet daily goals.  · Chill drinks to make them more refreshing.  · Suck on frozen lemon wedges to quench thirst.  · Only drink when youre thirsty.  · Chew sugarless gum or suck on hard candy to keep your mouth moist.  · Weigh yourself daily to know if your body's fluid content is rising.  My sodium goal  Your healthcare provider may give you a sodium goal to meet each day. This includes sodium found in food as well as salt that you add. My goal is to eat no more than ___________ mg of sodium per day.     When to call your doctor  Call your doctor right away if you have any symptoms of worsening heart failure. These can include:  · Sudden weight gain  · Increased swelling of your legs or ankles  · Trouble breathing when youre resting or at night  · Increase in the number of pillows you have to sleep on  · Chest pain, pressure, discomfort, or pain in the jaw, neck, or back   Date Last Reviewed: 3/21/2016  © 8588-5923 [employer]. [address] [place] [address], [place] [address]. All rights reserved. This information is not intended as a substitute for professional medical care. Always follow your healthcare professional's instructions.              Heart Failure: Medicines to Help Your Heart    You have a condition called heart failure (also known as congestive heart failure, or CHF). Your doctor will likely prescribe medicines for heart failure and any underlying health problems you have. Most heart failure patients take one or more types of medicinen. Your healthcare provider will work to find the combination of medicines that works best for you.  Heart failure medicines  Here  are the most common heart failure medicines:  · ACE inhibitors lower blood pressure and decrease strain on the heart. This makes it easier for the heart to pump. Angiotensin receptor blockers have similar effects. These are prescribed for some patients instead of ACE inhibitors.  · Beta-blockers relieve stress on the heart. They also improve symptoms. They may also improve the heart's pumping action over time.  · Diuretics (also called water pills) help rid your body of excess water. This can help rid your body of swelling (edema). Having less fluid to pump means your heart doesnt have to work as hard. Some diuretics make your body lose a mineral called potassium. Your doctor will tell you if you need to take supplements or eat more foods high in potassium.  · Digoxin helps your heart pump with more strength. This helps your heart pump more blood with each beat. So, more oxygen-rich blood travels to the rest of the body.  · Aldosterone antagonists help alter hormones and decrease strain on the heart.  · Hydralazine and nitrates are two separate medicines used together to treat heart failure. They may come in one combination pill. They lower blood pressure and decrease how hard the heart has to pump.  Medicines for related conditions  Controlling other heart problems helps keep heart failure under control, too. Depending on other heart problems you have, medicines may be prescribed to:  · Lower blood pressure (antihypertensives).  · Lower cholesterol levels (statins).  · Prevent blood clots (anticoagulants or aspirin).  · Keep the heartbeat steady (antiarrhythmics).  Date Last Reviewed: 3/5/2016  © 5481-8176 [employer]. [address] [place] [address], [place] [address]. All rights reserved. This information is not intended as a substitute for professional medical care. Always follow your healthcare professional's instructions.              Heart Failure: Procedures That May Help    The heart is a muscle  that pumps oxygen-rich blood to all parts of the body. When you have heart failure, the heart is not able to pump as well as it should. Blood and fluid may back up into the lungs (congestive heart failure), and some parts of the body dont get enough oxygen-rich blood to work normally. These problems lead to the symptoms of heart failure.     Certain procedures may help the heart pump better in some cases of heart failure. Some procedures are done to treat health problems that may have caused the heart failure such as coronary artery disease or heart rhythm problems. For more serious heart failure, other options are available.  Treating artery and valve problems  If you have coronary artery disease or valve disease, procedures may be done to improve blood flow. This helps the heart pump better, which can improve heart failure symptoms. First, your doctor may do a cardiac catheterization to help detect clogged blood vessels or valve damage. During this procedure, a  thin tube (catheter) in inserted into a blood vessel and guided to the heart. There a dye is injected and a special type of X-ray (angiogram) is taken of the blood vessels. Procedures to open a blocked artery or fix damaged valves can also be done using catheterization.  · Angioplasty uses a balloon-tipped instrument at the end of the catheter. The balloon is inflated to widen the narrowed artery. In many cases, a stent is expanded to further support the narrowed artery. A stent is a metal mesh tube.  · Valve surgery repairs or replacement of faulty valves can also be done during catheterization so blood can flow properly through the chambers of the heart.  Bypass surgery is another option to help treat blocked arteries. It uses a healthy blood vessel from elsewhere in the body. The healthy blood vessel is attached above and below the blocked area so that blood can flow around the blocked artery.  Treating heart rhythm problems  A device may be placed in  the chest to help a weak heart maintain a healthy, heartbeat so the heart can pump more effectively:  · Pacemaker. A pacemaker is an implanted device that regulates your heartbeat electronically. It monitors your heart's rhythm and generates a painless electric impulse that helps the heart beat in a regular rhythm. A pacemaker is programmed to meet your specific heart rhythm needs.  · Biventricular pacing/cardiac resynchronization therapy. A type of pacemaker that paces both pumping chambers of the heart at the same time to coordinate contractions and to improve the heart's function. Some people with heart failure are candidates for this therapy.  · Implantable cardioverter defibrillator. A device similar to a pacemaker that senses when the heart is beating too fast and delivers an electrical shock to convert the fast rhythm to a normal rhythm. This can be a life saving device.  In severe cases  In more serious cases of heart failure when other treatments no longer work, other options may include:  · Ventricular assist devices (VADs). These are mechanical devices used to take over the pumping function for one or both of the heart's ventricles, or pumping chambers. A VAD may be necessary when heart failure progresses to the point that medicines and other treatments no longer help. In some cases, a VAD may be used as a bridge to transplant.  · Heart transplant. This is replacing the diseased heart with a healthy one from a donor. This is an option for a few people who are very sick. A heart transplant is very serious and not an option for all patients. Your doctor can tell you more.  Date Last Reviewed: 3/20/2016  © 7576-8152 [employer]. [address] [place] [address], [place] [address]. All rights reserved. This information is not intended as a substitute for professional medical care. Always follow your healthcare professional's instructions.              Heart Failure: Tracking Your Weight  You have a  condition called heart failure. When you have heart failure, a sudden weight gain or a steady rise in weight is a warning sign that your body is retaining too much water and salt. This could mean your heart failure is getting worse. If left untreated, it can cause problems for your lungs and result in shortness of breath. Weighing yourself each day is the best way to know if youre retaining water. If your weight goes up quickly, call your doctor. You will be given instructions on how to get rid of the excess water. You will likely need medicines and to avoid salt. This will help your heart work better.  Call your doctor if you gain more than 2 pounds in 1 day, more than 5 pounds in 1 week, or whatever weight gain you were told to report by your doctor. This is often a sign of worsening heart failure and needs to be evaluated and treated. Your doctor will tell you what to do next.   Tips for weighing yourself    · Weigh yourself at the same time each morning, wearing the same clothes. Weigh yourself after urinating and before eating.  · Use the same scale each day. Make sure the numbers are easy to read. Put the scale on a flat, hard surface -- not on a rug or carpet.  · Do not stop weighing yourself. If you forget one day, weigh again the next morning.  How to use your weight chart  · Keep your weight chart near the scale. Write your weight on the chart as soon as you get off the scale.  · Fill in the month and the start date on the chart. Then write down your weight each day. Your chart will look like this:    · If you miss a day, leave the space blank. Weigh yourself the next day and write your weight in the next space.  · Take your weight chart with you when you go to see your doctor.  Date Last Reviewed: 3/20/2016  © 0692-7340 [employer]. [address] [place] [address], [place] [address]. All rights reserved. This information is not intended as a substitute for professional medical care. Always follow  your healthcare professional's instructions.              Heart Failure: Warning Signs of a Flare-Up  You have a condition called heart failure. Once you have heart failure, flare-ups can happen. Below are signs that can mean your heart failure is getting worse. If you notice any of these warning signs, call your healthcare provider.  Swelling    · Your feet, ankles, or lower legs get puffier.  · You notice skin changes on your lower legs.  · Your shoes feel too tight.  · Your clothes are tighter in the waist.  · You have trouble getting rings on or off your fingers.  Shortness of breath  · You have to breathe harder even when youre doing your normal activities or when youre resting.  · You are short of breath walking up stairs or even short distances.  · You wake up at night short of breath or coughing.  · You need to use more pillows or sit up to sleep.  · You wake up tired or restless.  Other warning signs  · You feel weaker, dizzy, or more tired.  · You have chest pain or changes in your heartbeat.  · You have a cough that wont go away.  · You cant remember things or dont feel like eating.  Tracking your weight  Gaining weight is often the first warning sign that heart failure is getting worse. Gaining even a few pounds can be a sign that your body is retaining excess water and salt. Weighing yourself each day in the morning after you urinate and before you eat, is the best way to know if you're retaining water. Get a scale that is easy to read and make sure you wear the same clothes and use the same scale every time you weigh. Your healthcare provider will show you how to track your weight. Call your doctor if you gain more than 2 pounds in 1 day, 5 pounds in 1 week, or whatever weight gain you were told to report by your doctor. This is often a sign of worsening heart failure and needs to be evaluated and treated before it compromises your breathing. Your doctor will tell you what to do next.    Date Last  Reviewed: 3/15/2016  © 5712-9687 [employer]. [address] [place] [address], [place] [address]. All rights reserved. This information is not intended as a substitute for professional medical care. Always follow your healthcare professional's instructions.              Chronic Kindey Disease Education             [facility] Sign-Up     Activating your [facility] account is as easy as 1-2-3!     1) Visit [email or web address], select Sign Up Now, enter this activation code and your date of birth, then select Next.  [email or web address]  Expires: 4/22/2017 10:15 AM      2) Create a username and password to use when you visit [facility] in the future and select a security question in case you lose your password and select Next.    3) Enter your e-mail address and click Sign Up!    Additional Information  If you have questions, please e-mail [email or web address] or call [phone] to talk to our [facility] staff. Remember, [facility] is NOT to be used for urgent needs. For medical emergencies, dial 911.          [facility] complies with applicable Federal civil rights laws and does not discriminate on the basis of race, color, national origin, age, disability, or sex.

## 2017-03-10 NOTE — ANESTHESIA POSTPROCEDURE EVALUATION
"Anesthesia Post Evaluation    Patient: Adilia Rosas    Procedure(s) Performed: Procedure(s) (LRB):  REMOVAL OF EXTERNAL FIXATION DEVICE (Left)  APPLICATION-CAST LONG LEG (Left)  REMOVAL-SUTURE (Left)    Final Anesthesia Type: MAC  Patient location during evaluation: OPS  Patient participation: Yes- Able to Participate  Level of consciousness: awake and alert and oriented  Post-procedure vital signs: reviewed and stable  Pain management: adequate  Airway patency: patent  PONV status at discharge: No PONV  Anesthetic complications: no      Cardiovascular status: blood pressure returned to baseline and hemodynamically stable  Respiratory status: unassisted  Hydration status: euvolemic  Follow-up not needed.        Visit Vitals    BP (!) 159/76    Pulse 65    Temp 36.6 °C (97.9 °F) (Oral)    Resp 20    Ht 5' 2" (1.575 m)    Wt 68.9 kg (152 lb)    LMP  (LMP Unknown)    SpO2 (!) 94%    Breastfeeding No    BMI 27.8 kg/m2       Pain/Kristie Score: Pain Assessment Performed: Yes (3/10/2017  6:35 AM)  Presence of Pain: denies (3/10/2017  6:35 AM)      "

## 2017-03-10 NOTE — DISCHARGE SUMMARY
Discharge Note     SUMMARY      Admit Date: 3/10/2017     Discharge Date and Time:  03/10/2017 8:11 AM     Hospital Course (synopsis of major diagnoses, care, treatment, and services provided during the course of the hospital stay):   81yF wit L distal femur fracture in ex fix, admitted on [facility] and taken to OR by Dr. Franklin for Ex fix removal, exam under anesthesia, and long leg cast application. Tolerated procedure well, no complications, discharged with proper instructions.      Final Diagnosis: Post-Op Diagnosis Codes:  * Closed fracture of distal end of left femur [S72.402A]     Disposition: Home or Self Care     Follow Up/Patient Instructions:      Medications:  Reconciled Home Medications:   Current Discharge Medication List           START taking these medications     Details   oxycodone-acetaminophen (PERCOCET) 5-325 mg per tablet Take 1 tablet by mouth every 4 (four) hours as needed for Pain.  Qty: 30 tablet, Refills: 0               CONTINUE these medications which have NOT CHANGED     Details   amlodipine (NORVASC) 10 MG tablet Take 10 mg by mouth every evening.       ascorbic acid, vitamin C, (VITAMIN C) 500 MG tablet Take 500 mg by mouth once daily.       atorvastatin (LIPITOR) 80 MG tablet Take 80 mg by mouth once daily.       benazepril (LOTENSIN) 20 MG tablet Take 20 mg by mouth once daily.       bisacodyl (DULCOLAX) 10 mg Supp Place 1 suppository (10 mg total) rectally daily as needed (Until bowel movement if patient has no bowel movement for 2 days).  Refills: 0       ferrous gluconate (FERGON) 325 MG Tab Take 325 mg by mouth daily with breakfast.       hydrocodone-acetaminophen 5-325mg (NORCO) 5-325 mg per tablet Take 2 tablets by mouth every 4 (four) hours as needed.  Qty: 50 tablet, Refills: 0       magnesium 200 mg Tab Take 400 mg by mouth 2 (two) times daily.       metoprolol tartrate (LOPRESSOR) 50 MG tablet Take 50 mg by mouth 2 (two) times daily.       pantoprazole (PROTONIX) 40 MG tablet  Take 40 mg by mouth 2 (two) times daily.       prednisoLONE acetate (PRED FORTE) 1 % DrpS 1 drop 4 (four) times daily.       senna-docusate 8.6-50 mg (PERICOLACE) 8.6-50 mg per tablet Take 1 tablet by mouth once daily.       vitamin D 1000 units Tab Take 370 mg by mouth once daily.       albuterol 90 mcg/actuation inhaler Inhale 2 puffs into the lungs every 4 (four) hours as needed for Wheezing.       clopidogrel (PLAVIX) 75 mg tablet Take 75 mg by mouth once daily.       lubiprostone (AMITIZA) 24 MCG Cap Take 24 mcg by mouth 2 (two) times daily with meals.       mirtazapine (REMERON) 15 MG tablet Take 15 mg by mouth every evening.                 Discharge Procedure Orders  Diet general           Follow-up Information      Follow up with Jose Antonio Franklin MD In 2 weeks.     Specialty: Orthopedic Surgery     Contact information:     [address] [facility]  [address]  [place] [address]  [phone]     I agree with findings outlined by the resident.

## 2017-03-10 NOTE — BRIEF OP NOTE
[facility]  Brief Operative Note     SUMMARY     Surgery Date: 3/10/2017     Surgeon(s) and Role:     * Jose Antonio Franklin MD - Primary    Assisting Surgeon:   Quentin Kay MD    Pre-op Diagnosis:    - left distal femur fracture    Post-op Diagnosis:  Post-Op Diagnosis Codes:   - left distal femur fracture    Procedure(s) (LRB):  REMOVAL OF EXTERNAL FIXATION DEVICE (Left)  APPLICATION-CAST LONG LEG (Left)  REMOVAL-SUTURE (Left)    Anesthesia: Monitor Anesthesia Care    Description of the findings of the procedure:   Bridging calus for left distal femur fracture with no gross instability.    Findings/Key Components: see above    Estimated Blood Loss: * No values recorded between 3/10/2017  7:26 AM and 3/10/2017  8:06 AM *         Specimens:   Specimen     None          Discharge Note    SUMMARY     Admit Date: 3/10/2017    Discharge Date and Time:  03/10/2017 8:11 AM    Hospital Course (synopsis of major diagnoses, care, treatment, and services provided during the course of the hospital stay):   81yF wit L distal femur fracture in ex fix, admitted on [facility] and taken to OR by Dr. Franklin for Ex fix removal, exam under anesthesia, and long leg cast application. Tolerated procedure well, no complications, discharged with proper instructions.     Final Diagnosis: Post-Op Diagnosis Codes:     * Closed fracture of distal end of left femur [S72.402A]    Disposition: Home or Self Care    Follow Up/Patient Instructions:     Medications:  Reconciled Home Medications:   Current Discharge Medication List      START taking these medications    Details   oxycodone-acetaminophen (PERCOCET) 5-325 mg per tablet Take 1 tablet by mouth every 4 (four) hours as needed for Pain.  Qty: 30 tablet, Refills: 0         CONTINUE these medications which have NOT CHANGED    Details   amlodipine (NORVASC) 10 MG tablet Take 10 mg by mouth every evening.      ascorbic acid, vitamin C, (VITAMIN C) 500 MG tablet Take 500 mg by  mouth once daily.      atorvastatin (LIPITOR) 80 MG tablet Take 80 mg by mouth once daily.      benazepril (LOTENSIN) 20 MG tablet Take 20 mg by mouth once daily.      bisacodyl (DULCOLAX) 10 mg Supp Place 1 suppository (10 mg total) rectally daily as needed (Until bowel movement if patient has no bowel movement for 2 days).  Refills: 0      ferrous gluconate (FERGON) 325 MG Tab Take 325 mg by mouth daily with breakfast.      hydrocodone-acetaminophen 5-325mg (NORCO) 5-325 mg per tablet Take 2 tablets by mouth every 4 (four) hours as needed.  Qty: 50 tablet, Refills: 0      magnesium 200 mg Tab Take 400 mg by mouth 2 (two) times daily.      metoprolol tartrate (LOPRESSOR) 50 MG tablet Take 50 mg by mouth 2 (two) times daily.      pantoprazole (PROTONIX) 40 MG tablet Take 40 mg by mouth 2 (two) times daily.      prednisoLONE acetate (PRED FORTE) 1 % DrpS 1 drop 4 (four) times daily.      senna-docusate 8.6-50 mg (PERICOLACE) 8.6-50 mg per tablet Take 1 tablet by mouth once daily.      vitamin D 1000 units Tab Take 370 mg by mouth once daily.      albuterol 90 mcg/actuation inhaler Inhale 2 puffs into the lungs every 4 (four) hours as needed for Wheezing.      clopidogrel (PLAVIX) 75 mg tablet Take 75 mg by mouth once daily.      lubiprostone (AMITIZA) 24 MCG Cap Take 24 mcg by mouth 2 (two) times daily with meals.      mirtazapine (REMERON) 15 MG tablet Take 15 mg by mouth every evening.             Discharge Procedure Orders  Diet general       Follow-up Information     Follow up with Jose Antonio Franklin MD In 2 weeks.    Specialty:  Orthopedic Surgery    Contact information:    [address]  [address]  [place] [address] [phone]          I agree with findings outlined by the resident.

## 2017-03-10 NOTE — TRANSFER OF CARE
"Anesthesia Transfer of Care Note    Patient: Adilia Rosas    Procedure(s) Performed: Procedure(s) (LRB):  REMOVAL OF EXTERNAL FIXATION DEVICE (Left)  APPLICATION-CAST LONG LEG (Left)  REMOVAL-SUTURE (Left)    Patient location: OPS    Anesthesia Type: MAC    Transport from OR: Transported from OR on room air with adequate spontaneous ventilation    Post pain: adequate analgesia    Post assessment: no apparent anesthetic complications and tolerated procedure well    Post vital signs: stable    Level of consciousness: awake, alert and oriented    Nausea/Vomiting: no nausea/vomiting    Complications: none          Last vitals:   Visit Vitals    BP (!) 159/76    Pulse 65    Temp 36.6 °C (97.9 °F) (Oral)    Resp 20    Ht 5' 2" (1.575 m)    Wt 68.9 kg (152 lb)    LMP  (LMP Unknown)    SpO2 (!) 94%    Breastfeeding No    BMI 27.8 kg/m2     "

## 2017-03-10 NOTE — H&P
[facility]  H&P    81yF s/p ORIF of L distal femur fracture complicated by infection and requiring HWR, Exfix placement, two I&Ds with abx bead placement and exchange.  She has been in the Ex Fix since the end of December and is requesting removal.     PMH: HTN  PSH: Dec 2016 ORIF Distal femur fracture, Ex fix/I&D in late Dec 2016, I&D abx ebad exchange in Jan 2017  Med: see med rec  All: celebrex  SH: no tobacco, etoh, drug use    PE:  GENERAL:  Alert in no acute distress.  SKIN:  Incision healed.  No surrounding erythema.  MOTOR: + ehl, fhl, ta, gs  SILT: t/s/s/sp/dp  Wwp, bcr    XR L distal femur, abx beads in place, some calus formation, fracture not healed, acceptable alignment    A/P:  81yF with L distal femur fracture complicated by surgical site infection, currently in ex fix.  - ex fix removal and long leg cast placement in OR  - then discharge to nursing facility.    There are no interval changes to report in the history and physical exam.

## 2017-03-10 NOTE — DISCHARGE INSTRUCTIONS
DIET: You may resume your home diet. If nausea is present, increase your diet gradually with fluids and bland foods    ACTIVITY LEVEL: If you have received sedation or an anesthetic, you may feel sleepy for several hours. Rest until you are more awake. Gradually resume your normal activities    Medications: Pain medication should be taken only if needed and as directed. If antibiotics are prescribed, the medication should be taken until completed. You will be given an updated list of you medications.    No driving, alcoholic beverages or signing legal documents for next 24 hours or while taking pain medication.       CALL THE DOCTOR:    For any obvious bleeding (some dried blood over the incision is normal).      Redness, swelling, foul smell around incision or fever over 101.   Shortness of breath, Coughing up Bloody sputum, Pains or Swelling in your Calves .   Persistent pain or nausea not relieved by medication.    If any unusual problems or difficulties occur contact your doctor. If you cannot contact your doctor but feel your signs and symptoms warrant a physicians attention return to the emergency room.

## 2017-03-12 NOTE — OP NOTE
DATE OF PROCEDURE:  03/10/2017    FACILITY:  [facility] [place].    SURGEON:  Jose Antonio Franklin M.D.    FIRST ASSISTANT:  Quentin Kay M.D. (RES).    PREOPERATIVE DIAGNOSES:  Left infected distal femur fracture, status post open   reduction internal fixation and placement of spanning external fixator.    INDICATION:  To remove external fixator.    POSTOPERATIVE DIAGNOSES:  Left infected distal femur fracture, status post open   reduction internal fixation and placement of spanning external fixator.    PROCEDURES:  1.  Removal of multiplane left spanning external fixator.  2.  Placement of long leg cast.  3.  Removal of sutures.    NARRATIVE:  The patient was first correctly identified in the preoperative   holding area.  Written informed consent was obtained.  The correct extremity was   marked.  The patient was brought into the Operating Room.  She received MAC   anesthesia.  Once the patient was asleep, the external fixator was removed.    Fluoroscopy was brought in to visualize the fracture site.  She had some   evidence of healing on the medial side of the fracture.  With varus and valgus   stress, there was no gross motion at the fracture site that we could see on   fluoroscopy.  At this point, we proceeded to remove her skin sutures.  In   addition, she was placed into a well-padded long leg cast.  She tolerated the   procedure without any known complications.    DRAINS:  None.    COMPLICATIONS:  None.    BLOOD LOSS:  None.      HOLLIS/  dd: 03/12/2017 08:26:48 (CDT)  td: 03/12/2017 12:09:24 (CDT)  Doc ID   #[id number]  Job ID #[id number]    CC:

## 2017-03-13 VITALS
RESPIRATION RATE: 18 BRPM | WEIGHT: 152 LBS | TEMPERATURE: 98 F | OXYGEN SATURATION: 98 % | HEIGHT: 62 IN | DIASTOLIC BLOOD PRESSURE: 70 MMHG | HEART RATE: 58 BPM | SYSTOLIC BLOOD PRESSURE: 125 MMHG | BODY MASS INDEX: 27.97 KG/M2

## 2017-03-16 NOTE — PHYSICIAN QUERY
Reviewer - Fidelia Prakash,  - [email or web address]    PT Name: Adilia Rosas  MR #: [id number]     Physician Query Form - Documentation Clarification    Reviewer  Ext     This form is a permanent document in the medical record.     Query Date: March 16, 2017  By submitting this query, we are merely seeking further clarification of documentation to reflect the severity of illness of your patient. Please utilize your independent clinical judgment when addressing the question(s) below.    (The Medical record reflects the following:)      Supporting Clinical Findings Location in Medical Record   A/P: 81yF with L distal femur fracture complicated by surgical site infection, currently in ex fix.  - ex fix removal and long leg cast placement in OR  - then discharge to nursing facility.   History and Physical    Disposition: Home or Self Care       Discharge Summary                                                                            Dear Doctor, There is conflicting information based on the documentation from the H&P and Discharge Summary for the Discharge Disposition. Can you clarify what is the Discharge Disposition?     Physician Use Only    _X_ Home  __ Nursing home  __ Other  __ Unable to determine                                                                                                                     [  ] Unable to determine

## 2018-10-16 ENCOUNTER — OUTSIDE PLACE OF SERVICE (OUTPATIENT)
Dept: CARDIOLOGY | Facility: CLINIC | Age: 83
End: 2018-10-16
Payer: MEDICARE

## 2018-10-16 ENCOUNTER — OUTSIDE PLACE OF SERVICE (OUTPATIENT)
Dept: CARDIOLOGY | Facility: CLINIC | Age: 83
End: 2018-10-16

## 2018-10-16 PROBLEM — I16.0 HYPERTENSIVE URGENCY: Status: ACTIVE | Noted: 2018-10-16

## 2018-10-16 PROBLEM — I42.9 CARDIOMYOPATHY: Status: ACTIVE | Noted: 2018-10-16

## 2018-10-16 PROBLEM — I50.43 ACUTE ON CHRONIC COMBINED SYSTOLIC AND DIASTOLIC CONGESTIVE HEART FAILURE, NYHA CLASS 4: Status: ACTIVE | Noted: 2018-10-16

## 2018-10-16 PROCEDURE — 93010 ELECTROCARDIOGRAM REPORT: CPT | Mod: S$GLB,,, | Performed by: INTERNAL MEDICINE

## 2018-10-16 PROCEDURE — 93306 TTE W/DOPPLER COMPLETE: CPT | Mod: 26,S$GLB,, | Performed by: INTERNAL MEDICINE

## 2018-10-16 PROCEDURE — 93010 ELECTROCARDIOGRAM REPORT: CPT | Mod: 76,S$GLB,, | Performed by: INTERNAL MEDICINE

## 2018-10-17 ENCOUNTER — OUTSIDE PLACE OF SERVICE (OUTPATIENT)
Dept: CARDIOLOGY | Facility: CLINIC | Age: 83
End: 2018-10-17
Payer: MEDICARE

## 2018-10-17 PROCEDURE — 99223 1ST HOSP IP/OBS HIGH 75: CPT | Mod: S$GLB,,, | Performed by: STUDENT IN AN ORGANIZED HEALTH CARE EDUCATION/TRAINING PROGRAM

## 2018-11-02 ENCOUNTER — OFFICE VISIT (OUTPATIENT)
Dept: CARDIOLOGY | Facility: CLINIC | Age: 83
End: 2018-11-02
Payer: MEDICARE

## 2018-11-02 VITALS
OXYGEN SATURATION: 96 % | SYSTOLIC BLOOD PRESSURE: 161 MMHG | WEIGHT: 159.88 LBS | HEART RATE: 74 BPM | HEIGHT: 64 IN | DIASTOLIC BLOOD PRESSURE: 73 MMHG | BODY MASS INDEX: 27.29 KG/M2

## 2018-11-02 DIAGNOSIS — I42.9 CARDIOMYOPATHY, UNSPECIFIED TYPE: ICD-10-CM

## 2018-11-02 DIAGNOSIS — Z86.79 H/O MYOCARDIAL ISCHEMIA: ICD-10-CM

## 2018-11-02 DIAGNOSIS — I50.43 ACUTE ON CHRONIC COMBINED SYSTOLIC AND DIASTOLIC CONGESTIVE HEART FAILURE, NYHA CLASS 4: Primary | ICD-10-CM

## 2018-11-02 DIAGNOSIS — I10 ESSENTIAL HYPERTENSION: ICD-10-CM

## 2018-11-02 PROCEDURE — 99214 OFFICE O/P EST MOD 30 MIN: CPT | Mod: S$GLB,,, | Performed by: INTERNAL MEDICINE

## 2018-11-02 NOTE — PROGRESS NOTES
Subjective:   Patient ID:  Adilia Rosas is a 83 y.o. female who presents for follow-up of Hospital Follow Up      Problem List Items Addressed This Visit        Cardiac/Vascular    Essential hypertension    H/O myocardial ischemia    Acute on chronic combined systolic and diastolic congestive heart failure, NYHA class 4 - Primary    Cardiomyopathy          HPI: 82 y/o AA female with the above medical problems is here for f/u. She was recently admitted for acute decompensated CHF after non compliance with medications. Echo showed reduce ef with no previous ischemic w/u. She is feeling better today with no complaints. BP is elevated. She is unsure of what medications she is taking and did not take medications with her. No orthopnea or PND.     Review of Systems   Constitution: Negative.   HENT: Negative.    Eyes: Negative.    Cardiovascular: Negative.    Respiratory: Negative.    Endocrine: Negative.    Hematologic/Lymphatic: Negative.    Skin: Negative.    Musculoskeletal: Negative.    Gastrointestinal: Negative.    Neurological: Negative.    Psychiatric/Behavioral: Negative.    Allergic/Immunologic: Negative.           Medication List           Accurate as of 11/2/18  8:42 AM. If you have any questions, ask your nurse or doctor.               CONTINUE taking these medications    albuterol 90 mcg/actuation inhaler  Commonly known as:  PROVENTIL/VENTOLIN HFA     amLODIPine 10 MG tablet  Commonly known as:  NORVASC     atorvastatin 80 MG tablet  Commonly known as:  LIPITOR     benazepril 20 MG tablet  Commonly known as:  LOTENSIN     bisacodyl 10 mg Supp  Commonly known as:  DULCOLAX  Place 1 suppository (10 mg total) rectally daily as needed (Until bowel movement if patient has no bowel movement for 2 days).     clopidogrel 75 mg tablet  Commonly known as:  PLAVIX     ferrous gluconate 325 MG Tab  Commonly known as:  FERGON     HYDROcodone-acetaminophen 5-325 mg per tablet  Commonly known as:  NORCO  Take 2 tablets  by mouth every 4 (four) hours as needed.     lubiprostone 24 MCG Cap  Commonly known as:  AMITIZA     magnesium 200 mg Tab     metoprolol tartrate 50 MG tablet  Commonly known as:  LOPRESSOR     mirtazapine 15 MG tablet  Commonly known as:  REMERON     oxyCODONE-acetaminophen 5-325 mg per tablet  Commonly known as:  PERCOCET  Take 1 tablet by mouth every 4 (four) hours as needed for Pain.     pantoprazole 40 MG tablet  Commonly known as:  PROTONIX     prednisoLONE acetate 1 % Drps  Commonly known as:  PRED FORTE     senna-docusate 8.6-50 mg 8.6-50 mg per tablet  Commonly known as:  PERICOLACE     VITAMIN C 500 MG tablet  Generic drug:  ascorbic acid (vitamin C)     vitamin D 1000 units Tab  Commonly known as:  VITAMIN D3            Objective:   Physical Exam   Constitutional: She is oriented to person, place, and time. She appears well-developed and well-nourished. No distress.   Examination of the digits showed no clubbing or cyanosis   HENT:   Head: Normocephalic and atraumatic.   Eyes: Conjunctivae are normal. Pupils are equal, round, and reactive to light. Right eye exhibits no discharge.   Neck: Normal range of motion. Neck supple. No JVD present. No thyromegaly present.   No carotid bruits   Cardiovascular: Normal rate, regular rhythm, S1 normal, S2 normal, normal heart sounds, intact distal pulses and normal pulses. PMI is not displaced. Exam reveals no gallop, no friction rub and no opening snap.   No murmur heard.  Pulmonary/Chest: Effort normal and breath sounds normal. No respiratory distress. She has no wheezes. She has no rales. She exhibits no tenderness.   Abdominal: Soft. Bowel sounds are normal. She exhibits no distension and no mass. There is no tenderness. There is no guarding.   No hepatosplenomegaly   Musculoskeletal: Normal range of motion. She exhibits no edema or tenderness.   Lymphadenopathy:     She has no cervical adenopathy.   Neurological: She is alert and oriented to person, place, and  time.   Skin: Skin is warm. No rash noted. She is not diaphoretic. No erythema.   Psychiatric: She has a normal mood and affect.   Nursing note and vitals reviewed.      ECGs reviewed-NSR with inferior infarct  LABS reviewed  Imaging including Echoes reviewed    Assessment:     1. Acute on chronic combined systolic and diastolic congestive heart failure, NYHA class 4    2. Cardiomyopathy, unspecified type    3. H/O myocardial ischemia    4. Essential hypertension        Plan:     With kidney function with do Nuclear stress.   Continue current medications. Have patient take medications to next appointment  Low salt diet  F/u in 1 month

## 2018-11-12 ENCOUNTER — HOSPITAL ENCOUNTER (INPATIENT)
Facility: HOSPITAL | Age: 83
LOS: 7 days | Discharge: NURSING, REHAB OR LONG-TERM CARE | DRG: 469 | End: 2018-11-19
Attending: EMERGENCY MEDICINE | Admitting: FAMILY MEDICINE
Payer: MEDICARE

## 2018-11-12 DIAGNOSIS — I51.89 DIASTOLIC DYSFUNCTION: ICD-10-CM

## 2018-11-12 DIAGNOSIS — S72.009A CLOSED FRACTURE OF NECK OF FEMUR, UNSPECIFIED LATERALITY, INITIAL ENCOUNTER: ICD-10-CM

## 2018-11-12 DIAGNOSIS — I50.9 HEART FAILURE, UNSPECIFIED HF CHRONICITY, UNSPECIFIED HEART FAILURE TYPE: ICD-10-CM

## 2018-11-12 DIAGNOSIS — E87.5 HYPERKALEMIA: ICD-10-CM

## 2018-11-12 DIAGNOSIS — S72.009A FEMORAL NECK FRACTURE: ICD-10-CM
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
## 2018-11-12 DIAGNOSIS — S72.001A CLOSED FRACTURE OF NECK OF RIGHT FEMUR, INITIAL ENCOUNTER: Primary | ICD-10-CM

## 2018-11-12 DIAGNOSIS — I42.9 CARDIOMYOPATHY, UNSPECIFIED TYPE: ICD-10-CM

## 2018-11-12 DIAGNOSIS — K21.9 GASTROESOPHAGEAL REFLUX DISEASE WITHOUT ESOPHAGITIS: ICD-10-CM

## 2018-11-12 DIAGNOSIS — R93.1 DECREASED CARDIAC EJECTION FRACTION: ICD-10-CM

## 2018-11-12 DIAGNOSIS — N18.32 CHRONIC KIDNEY DISEASE (CKD) STAGE G3B/A1, MODERATELY DECREASED GLOMERULAR FILTRATION RATE (GFR) BETWEEN 30-44 ML/MIN/1.73 SQUARE METER AND ALBUMINURIA CREATININE RATIO LESS THAN 30 MG/G: ICD-10-CM

## 2018-11-12 DIAGNOSIS — I50.9 HEART FAILURE: ICD-10-CM

## 2018-11-12 DIAGNOSIS — Z86.79 H/O MYOCARDIAL ISCHEMIA: ICD-10-CM

## 2018-11-12 DIAGNOSIS — I27.20 PULMONARY HYPERTENSION: ICD-10-CM

## 2018-11-12 DIAGNOSIS — I50.43 ACUTE ON CHRONIC COMBINED SYSTOLIC AND DIASTOLIC CONGESTIVE HEART FAILURE, NYHA CLASS 4: ICD-10-CM

## 2018-11-12 DIAGNOSIS — I10 ESSENTIAL HYPERTENSION: ICD-10-CM

## 2018-11-12 LAB
ALBUMIN SERPL BCP-MCNC: 3.4 G/DL
ALP SERPL-CCNC: 88 U/L
ALT SERPL W/O P-5'-P-CCNC: 12 U/L
ANION GAP SERPL CALC-SCNC: 9 MMOL/L
AORTIC ROOT ANNULUS: 3.04 CM
APTT BLDCRRT: 32.2 SEC
AST SERPL-CCNC: 23 U/L
AV MEAN GRADIENT: 7.07 MMHG
AV PEAK GRADIENT: 14.75 MMHG
AV VALVE AREA: 2 CM2
BASOPHILS # BLD AUTO: 0.01 K/UL
BASOPHILS NFR BLD: 0.1 %
BILIRUB SERPL-MCNC: 0.6 MG/DL
BILIRUB UR QL STRIP: NEGATIVE
BSA FOR ECHO PROCEDURE: 1.86 M2
BUN SERPL-MCNC: 26 MG/DL
CALCIUM SERPL-MCNC: 10 MG/DL
CHLORIDE SERPL-SCNC: 114 MMOL/L
CLARITY UR: CLEAR
CO2 SERPL-SCNC: 17 MMOL/L
COLOR UR: YELLOW
CREAT SERPL-MCNC: 2 MG/DL
CV ECHO LV RWT: 0.44 CM
DIFFERENTIAL METHOD: ABNORMAL
DOP CALC AO PEAK VEL: 1.92 M/S
DOP CALC AO VTI: 41.4 CM
DOP CALC LVOT AREA: 3.3 CM2
DOP CALC LVOT DIAMETER: 2.05 CM
DOP CALC LVOT STROKE VOLUME: 82.7 CM3
DOP CALCLVOT PEAK VEL VTI: 25.07 CM
E WAVE DECELERATION TIME: 277.3 MSEC
E/A RATIO: 0.57
ECHO LV POSTERIOR WALL: 1.2 CM (ref 0.6–1.1)
EOSINOPHIL # BLD AUTO: 0 K/UL
EOSINOPHIL NFR BLD: 0.5 %
ERYTHROCYTE [DISTWIDTH] IN BLOOD BY AUTOMATED COUNT: 14.9 %
EST. GFR  (AFRICAN AMERICAN): 26 ML/MIN/1.73 M^2
EST. GFR  (NON AFRICAN AMERICAN): 23 ML/MIN/1.73 M^2
ESTIMATED AVG GLUCOSE: 91 MG/DL
FRACTIONAL SHORTENING: 35 % (ref 28–44)
GLUCOSE SERPL-MCNC: 81 MG/DL
GLUCOSE UR QL STRIP: NEGATIVE
HBA1C MFR BLD HPLC: 4.8 %
HCT VFR BLD AUTO: 35.4 %
HGB BLD-MCNC: 10.8 G/DL
HGB UR QL STRIP: ABNORMAL
INR PPP: 1.1
INTERVENTRICULAR SEPTUM: 1.2 CM (ref 0.6–1.1)
KETONES UR QL STRIP: NEGATIVE
LA MAJOR: 6.42 CM
LA MINOR: 6.13 CM
LA WIDTH: 4.91 CM
LEFT ATRIUM SIZE: 4.35 CM
LEFT ATRIUM VOLUME INDEX: 61.2 ML/M2
LEFT ATRIUM VOLUME: 113.86 CM3
LEFT INTERNAL DIMENSION IN SYSTOLE: 3.55 CM (ref 2.1–4)
LEFT VENTRICLE DIASTOLIC VOLUME INDEX: 79.05 ML/M2
LEFT VENTRICLE DIASTOLIC VOLUME: 147.04 ML
LEFT VENTRICLE MASS INDEX: 146 G/M2
LEFT VENTRICLE SYSTOLIC VOLUME INDEX: 28.3 ML/M2
LEFT VENTRICLE SYSTOLIC VOLUME: 52.7 ML
LEFT VENTRICULAR INTERNAL DIMENSION IN DIASTOLE: 5.49 CM (ref 3.5–6)
LEFT VENTRICULAR MASS: 271.58 G
LEUKOCYTE ESTERASE UR QL STRIP: NEGATIVE
LYMPHOCYTES # BLD AUTO: 0.9 K/UL
LYMPHOCYTES NFR BLD: 10.3 %
MCH RBC QN AUTO: 29.2 PG
MCHC RBC AUTO-ENTMCNC: 30.5 G/DL
MCV RBC AUTO: 96 FL
MONOCYTES # BLD AUTO: 0.4 K/UL
MONOCYTES NFR BLD: 5 %
MV PEAK A VEL: 1.12 M/S
MV PEAK E VEL: 0.64 M/S
NEUTROPHILS # BLD AUTO: 7.3 K/UL
NEUTROPHILS NFR BLD: 84.1 %
NITRITE UR QL STRIP: NEGATIVE
PH UR STRIP: 7 [PH] (ref 5–8)
PISA TR MAX VEL: 3.32 M/S
PLATELET # BLD AUTO: 151 K/UL
PMV BLD AUTO: 11.8 FL
POCT GLUCOSE: 84 MG/DL (ref 70–110)
POCT GLUCOSE: 95 MG/DL (ref 70–110)
POTASSIUM SERPL-SCNC: 5 MMOL/L
PROT SERPL-MCNC: 7.1 G/DL
PROT UR QL STRIP: ABNORMAL
PROTHROMBIN TIME: 11.8 SEC
PULM VEIN S/D RATIO: 1.55
PV PEAK D VEL: 0.29 M/S
PV PEAK S VEL: 0.45 M/S
PV PEAK VELOCITY: 1.06 CM/S
RA MAJOR: 5.65 CM
RA PRESSURE: 15 MMHG
RBC # BLD AUTO: 3.7 M/UL
RIGHT VENTRICULAR END-DIASTOLIC DIMENSION: 2.52 CM
SODIUM SERPL-SCNC: 140 MMOL/L
SP GR UR STRIP: 1.01 (ref 1–1.03)
TR MAX PG: 44.09 MMHG
TSH SERPL DL<=0.005 MIU/L-ACNC: 0.98 UIU/ML
TV REST PULMONARY ARTERY PRESSURE: 59.09 MMHG
URN SPEC COLLECT METH UR: ABNORMAL
UROBILINOGEN UR STRIP-ACNC: NEGATIVE EU/DL
WBC # BLD AUTO: 8.73 K/UL

## 2018-11-12 PROCEDURE — 11000001 HC ACUTE MED/SURG PRIVATE ROOM

## 2018-11-12 PROCEDURE — 83036 HEMOGLOBIN GLYCOSYLATED A1C: CPT

## 2018-11-12 PROCEDURE — 99285 EMERGENCY DEPT VISIT HI MDM: CPT | Mod: 25

## 2018-11-12 PROCEDURE — 81003 URINALYSIS AUTO W/O SCOPE: CPT

## 2018-11-12 PROCEDURE — 51702 INSERT TEMP BLADDER CATH: CPT

## 2018-11-12 PROCEDURE — 25000003 PHARM REV CODE 250: Performed by: FAMILY MEDICINE

## 2018-11-12 PROCEDURE — 85610 PROTHROMBIN TIME: CPT

## 2018-11-12 PROCEDURE — 80053 COMPREHEN METABOLIC PANEL: CPT

## 2018-11-12 PROCEDURE — 84443 ASSAY THYROID STIM HORMONE: CPT

## 2018-11-12 PROCEDURE — 85025 COMPLETE CBC W/AUTO DIFF WBC: CPT

## 2018-11-12 PROCEDURE — 36415 COLL VENOUS BLD VENIPUNCTURE: CPT

## 2018-11-12 PROCEDURE — 63600175 PHARM REV CODE 636 W HCPCS: Performed by: FAMILY MEDICINE

## 2018-11-12 PROCEDURE — 85730 THROMBOPLASTIN TIME PARTIAL: CPT

## 2018-11-12 PROCEDURE — 96374 THER/PROPH/DIAG INJ IV PUSH: CPT | Mod: 59

## 2018-11-12 RX ORDER — IBUPROFEN 200 MG
24 TABLET ORAL
Status: DISCONTINUED | OUTPATIENT
Start: 2018-11-12 | End: 2018-11-19 | Stop reason: HOSPADM

## 2018-11-12 RX ORDER — BISACODYL 10 MG
10 SUPPOSITORY, RECTAL RECTAL DAILY PRN
Status: DISCONTINUED | OUTPATIENT
Start: 2018-11-12 | End: 2018-11-19 | Stop reason: HOSPADM

## 2018-11-12 RX ORDER — GLUCAGON 1 MG
1 KIT INJECTION
Status: DISCONTINUED | OUTPATIENT
Start: 2018-11-12 | End: 2018-11-19 | Stop reason: HOSPADM

## 2018-11-12 RX ORDER — HYDRALAZINE HYDROCHLORIDE 50 MG/1
50 TABLET, FILM COATED ORAL 2 TIMES DAILY
Status: ON HOLD | COMMUNITY
End: 2018-11-19 | Stop reason: HOSPADM

## 2018-11-12 RX ORDER — AMOXICILLIN 250 MG
1 CAPSULE ORAL DAILY
Status: DISCONTINUED | OUTPATIENT
Start: 2018-11-13 | End: 2018-11-19 | Stop reason: HOSPADM

## 2018-11-12 RX ORDER — MIRTAZAPINE 15 MG/1
15 TABLET, FILM COATED ORAL NIGHTLY
Status: DISCONTINUED | OUTPATIENT
Start: 2018-11-12 | End: 2018-11-19 | Stop reason: HOSPADM

## 2018-11-12 RX ORDER — ISOSORBIDE MONONITRATE 60 MG/1
60 TABLET, EXTENDED RELEASE ORAL DAILY
Status: ON HOLD | COMMUNITY
End: 2018-11-19 | Stop reason: HOSPADM

## 2018-11-12 RX ORDER — AMLODIPINE BESYLATE 5 MG/1
10 TABLET ORAL NIGHTLY
Status: DISCONTINUED | OUTPATIENT
Start: 2018-11-12 | End: 2018-11-15

## 2018-11-12 RX ORDER — ALBUTEROL SULFATE 90 UG/1
2 AEROSOL, METERED RESPIRATORY (INHALATION) EVERY 4 HOURS PRN
Status: DISCONTINUED | OUTPATIENT
Start: 2018-11-12 | End: 2018-11-19 | Stop reason: HOSPADM

## 2018-11-12 RX ORDER — ONDANSETRON 2 MG/ML
4 INJECTION INTRAMUSCULAR; INTRAVENOUS
Status: DISCONTINUED | OUTPATIENT
Start: 2018-11-12 | End: 2018-11-13

## 2018-11-12 RX ORDER — SODIUM CHLORIDE 0.9 % (FLUSH) 0.9 %
5 SYRINGE (ML) INJECTION
Status: DISCONTINUED | OUTPATIENT
Start: 2018-11-12 | End: 2018-11-19 | Stop reason: HOSPADM

## 2018-11-12 RX ORDER — OXYCODONE AND ACETAMINOPHEN 5; 325 MG/1; MG/1
1 TABLET ORAL EVERY 4 HOURS PRN
Status: DISCONTINUED | OUTPATIENT
Start: 2018-11-12 | End: 2018-11-13

## 2018-11-12 RX ORDER — MORPHINE SULFATE 4 MG/ML
4 INJECTION, SOLUTION INTRAMUSCULAR; INTRAVENOUS
Status: DISCONTINUED | OUTPATIENT
Start: 2018-11-12 | End: 2018-11-13

## 2018-11-12 RX ORDER — LANOLIN ALCOHOL/MO/W.PET/CERES
400 CREAM (GRAM) TOPICAL 2 TIMES DAILY
Status: DISCONTINUED | OUTPATIENT
Start: 2018-11-12 | End: 2018-11-15

## 2018-11-12 RX ORDER — BENAZEPRIL HYDROCHLORIDE 20 MG/1
20 TABLET ORAL DAILY
Status: DISCONTINUED | OUTPATIENT
Start: 2018-11-13 | End: 2018-11-15

## 2018-11-12 RX ORDER — LISINOPRIL 5 MG/1
5 TABLET ORAL DAILY
Status: ON HOLD | COMMUNITY
End: 2018-11-19 | Stop reason: HOSPADM

## 2018-11-12 RX ORDER — ASCORBIC ACID 500 MG
500 TABLET ORAL DAILY
Status: DISCONTINUED | OUTPATIENT
Start: 2018-11-13 | End: 2018-11-19 | Stop reason: HOSPADM

## 2018-11-12 RX ORDER — LUBIPROSTONE 24 UG/1
24 CAPSULE ORAL 2 TIMES DAILY WITH MEALS
Status: DISCONTINUED | OUTPATIENT
Start: 2018-11-12 | End: 2018-11-19 | Stop reason: HOSPADM

## 2018-11-12 RX ORDER — METOPROLOL TARTRATE 50 MG/1
50 TABLET ORAL 2 TIMES DAILY
Status: DISCONTINUED | OUTPATIENT
Start: 2018-11-12 | End: 2018-11-15

## 2018-11-12 RX ORDER — ONDANSETRON 8 MG/1
8 TABLET, ORALLY DISINTEGRATING ORAL EVERY 8 HOURS PRN
Status: DISCONTINUED | OUTPATIENT
Start: 2018-11-12 | End: 2018-11-19 | Stop reason: HOSPADM

## 2018-11-12 RX ORDER — ATORVASTATIN CALCIUM 40 MG/1
80 TABLET, FILM COATED ORAL DAILY
Status: DISCONTINUED | OUTPATIENT
Start: 2018-11-13 | End: 2018-11-19 | Stop reason: HOSPADM

## 2018-11-12 RX ORDER — IBUPROFEN 200 MG
16 TABLET ORAL
Status: DISCONTINUED | OUTPATIENT
Start: 2018-11-12 | End: 2018-11-19 | Stop reason: HOSPADM

## 2018-11-12 RX ORDER — MORPHINE SULFATE 2 MG/ML
2 INJECTION, SOLUTION INTRAMUSCULAR; INTRAVENOUS EVERY 4 HOURS PRN
Status: DISCONTINUED | OUTPATIENT
Start: 2018-11-12 | End: 2018-11-19 | Stop reason: HOSPADM

## 2018-11-12 RX ORDER — PANTOPRAZOLE SODIUM 40 MG/1
40 TABLET, DELAYED RELEASE ORAL 2 TIMES DAILY
Status: DISCONTINUED | OUTPATIENT
Start: 2018-11-12 | End: 2018-11-16

## 2018-11-12 RX ORDER — FERROUS GLUCONATE 324(37.5)
325 TABLET ORAL
Status: DISCONTINUED | OUTPATIENT
Start: 2018-11-13 | End: 2018-11-19 | Stop reason: HOSPADM

## 2018-11-12 RX ORDER — HYDROCODONE BITARTRATE AND ACETAMINOPHEN 10; 325 MG/1; MG/1
1 TABLET ORAL EVERY 6 HOURS PRN
Status: DISCONTINUED | OUTPATIENT
Start: 2018-11-12 | End: 2018-11-13

## 2018-11-12 RX ORDER — CHOLECALCIFEROL (VITAMIN D3) 25 MCG
2000 TABLET ORAL DAILY
Status: DISCONTINUED | OUTPATIENT
Start: 2018-11-13 | End: 2018-11-19 | Stop reason: HOSPADM

## 2018-11-12 RX ORDER — DEXTROSE MONOHYDRATE AND SODIUM CHLORIDE 5; .45 G/100ML; G/100ML
INJECTION, SOLUTION INTRAVENOUS CONTINUOUS
Status: DISCONTINUED | OUTPATIENT
Start: 2018-11-13 | End: 2018-11-13

## 2018-11-12 RX ADMIN — AMLODIPINE BESYLATE 10 MG: 5 TABLET ORAL at 09:11

## 2018-11-12 RX ADMIN — MIRTAZAPINE 15 MG: 15 TABLET, FILM COATED ORAL at 09:11

## 2018-11-12 RX ADMIN — METOPROLOL TARTRATE 50 MG: 50 TABLET ORAL at 09:11

## 2018-11-12 RX ADMIN — MORPHINE SULFATE 2 MG: 2 INJECTION, SOLUTION INTRAMUSCULAR; INTRAVENOUS at 02:11

## 2018-11-12 RX ADMIN — HYDROCODONE BITARTRATE AND ACETAMINOPHEN 1 TABLET: 10; 325 TABLET ORAL at 06:11

## 2018-11-12 RX ADMIN — PANTOPRAZOLE SODIUM 40 MG: 40 TABLET, DELAYED RELEASE ORAL at 09:11

## 2018-11-12 RX ADMIN — MAGNESIUM OXIDE TAB 400 MG (241.3 MG ELEMENTAL MG) 400 MG: 400 (241.3 MG) TAB at 09:11

## 2018-11-12 NOTE — PLAN OF CARE
Signed by daughter.     11/12/18 6706   DENISE Message   Medicare Outpatient and Observation Notification regarding financial responsibility Given to patient/caregiver;Explained to patient/caregiver;Signed/date by patient/caregiver   Date DENISE was signed 11/12/18   Time DENISE was signed [id number]

## 2018-11-12 NOTE — CONSULTS
ORTHOPAEDIC SURGERY  cONSULT      REASON FOR ADMISSION:  Fall on same level from slipping, tripping and stumbling without subsequent striking against object, initial encounter [W01.0XXA]    SUBJECTIVE:     HISTORY OF PRESENT ILLNESS:  Adilia Rosas is a 83 y.o. female who presents to [facility] on 11/12/2018 . The patient was transferred from another facility after sustaining a fall unto her right side. At the OSH she was noted to have a right hip fracture. She was transferred to [place] where [facility] orthopaedics was requested to evaluate.         MEDICATIONS:  Home Medications:  No current facility-administered medications on file prior to encounter.      Current Outpatient Medications on File Prior to Encounter   Medication Sig Dispense Refill    albuterol 90 mcg/actuation inhaler Inhale 2 puffs into the lungs every 4 (four) hours as needed for Wheezing.      amlodipine (NORVASC) 10 MG tablet Take 10 mg by mouth every evening.      ascorbic acid, vitamin C, (VITAMIN C) 500 MG tablet Take 500 mg by mouth once daily.      atorvastatin (LIPITOR) 80 MG tablet Take 80 mg by mouth once daily.      benazepril (LOTENSIN) 20 MG tablet Take 20 mg by mouth once daily.      bisacodyl (DULCOLAX) 10 mg Supp Place 1 suppository (10 mg total) rectally daily as needed (Until bowel movement if patient has no bowel movement for 2 days).  0    clopidogrel (PLAVIX) 75 mg tablet Take 75 mg by mouth once daily.      ferrous gluconate (FERGON) 325 MG Tab Take 325 mg by mouth daily with breakfast.      hydrocodone-acetaminophen 5-325mg (NORCO) 5-325 mg per tablet Take 2 tablets by mouth every 4 (four) hours as needed. 50 tablet 0    lubiprostone (AMITIZA) 24 MCG Cap Take 24 mcg by mouth 2 (two) times daily with meals.      magnesium 200 mg Tab Take 400 mg by mouth 2 (two) times daily.      metoprolol tartrate (LOPRESSOR) 50 MG tablet Take 50 mg by mouth 2 (two) times daily.      mirtazapine (REMERON) 15 MG tablet Take 15 mg by mouth  every evening.      oxycodone-acetaminophen (PERCOCET) 5-325 mg per tablet Take 1 tablet by mouth every 4 (four) hours as needed for Pain. 30 tablet 0    pantoprazole (PROTONIX) 40 MG tablet Take 40 mg by mouth 2 (two) times daily.      prednisoLONE acetate (PRED FORTE) 1 % DrpS 1 drop 4 (four) times daily.      senna-docusate 8.6-50 mg (PERICOLACE) 8.6-50 mg per tablet Take 1 tablet by mouth once daily.      vitamin D 1000 units Tab Take 370 mg by mouth once daily.       Inpatient Medications:   morphine  4 mg Intravenous ED 1 Time    ondansetron  4 mg Intravenous ED 1 Time     Infusions:  PRN Medications:morphine    ALLERGIES:    Review of patient's allergies indicates:   Allergen Reactions    Celebrex [celecoxib] Other (See Comments)     Pain down her spine       PAST MEDICAL HISTORY:    Past Medical History:   Diagnosis Date    Arthritis     Elevated cholesterol     GERD (gastroesophageal reflux disease)     History of DVT (deep vein thrombosis) 2016    RUE    Hypertension        SURGICAL HISTORY:  Past Surgical History:   Procedure Laterality Date    APPLICATION-CAST LONG LEG Left 3/10/2017    [facility]    APPLICATION-EXTERNAL FIXATION DEVICE; knee spanning Left 12/27/2016    [facility] at [facility]    EXPLORATORY-LAPAROTOMY, possible bowel resection N/A 9/19/2016    [facility] by [facility] at [facility]    HERNIA REPAIR      HYSTERECTOMY      IRRIGATION AND DEBRIDEMENT LOWER EXTREMITY Left 1/7/2017    [facility] at [facility]    IRRIGATION AND DEBRIDEMENT LOWER EXTREMITY; placement of antibiotic beads Left 12/27/2016    [facility] at [facility]    IRRIGATION AND DEBRIDEMENT LOWER EXTREMITY; possible antibiotic bead placement; possible wound vac Left 12/26/2016    [facility] at [facility]    LAPAROSCOPY-DIAGNOSTIC N/A 9/19/2016    [facility] at [facility]  OR    LYSIS-ADHESION N/A 9/19/2016    [facility] OR    OPEN REDUCTION INTERNAL FIXATION-FEMUR Left 12/9/2016    [facility] by [facility] at [facility] OR    ORIF FEMUR FRACTURE Left 12/2016    PLACEMENT SPACER-ANTIBIOTIC Left 1/7/2017    [facility]formed by [facility] at [facility] OR    PLACEMENT SPACER-ANTIBIOTIC Left 12/27/2016    [facility]formed by [facility] at [facility] OR    REMOVAL OF EXTERNAL FIXATION DEVICE Left 3/10/2017    Performed by [facility] at [facility] OR    REMOVAL-HARDWARE-LEG Left 12/27/2016    [facility]formed by [facility] at [facility]    REMOVAL-SUTURE Left 3/10/2017    Performed by [facility] at [facility] OR       FAMILY HISTORY:  No family history on file.    SOCIAL HISTORY:  Social History     Tobacco Use    Smoking status: Never Smoker   Substance Use Topics    Alcohol use: No    Drug use: No        REVIEW OF SYSTEMS:  A 10-point review of systems is negative except for the above mentioned in the HPI.    OBJECTIVE:     Most Recent Vitals:  Temp: 97.9 °F (36.6 °C) (11/12/18 1238)  Pulse: 66 (11/12/18 1514)  Resp: 16 (11/12/18 1514)  BP: (!) 161/83 (11/12/18 1514)  SpO2: 98 % (11/12/18 1514)      PHYSICAL EXAM:  AAO, NAD, well developed and well nourished.  Head normocephalic, atraumatic.  Trachea midline, neck supple.  Respirations unlabored with good inspiratory effort.  Heart regular rate and rhythm.  Abdomen soft, obese, nondistended  Bilateral upper extremity:  Moves well at shoulder, elbow, wrist and hands.  SILT: R/U/M  LLE:  Skin intact to extremity, old venous stasis skin changes noted, brown and scaly  SILT: S/S/DP/SP/T  Pulse: 1+ DP  EHL/FHL/GS/TA 5/5  RLE:   Skin intact to extremity  Extremity shortened and externally rotated  SILT: S/S/DP/SP/T  Pulse: 1+ DP  EHL/FHL/GS/TA 5/5          LABORATORY VALUES:  Recent Labs   Lab 11/12/18  1342   WBC 8.73   HGB 10.8*   HCT 35.4*        Recent Labs   Lab 11/12/18  1342   NA  140   K 5.0   *   CO2 17*   BUN 26*   CREATININE 2.0*   GLU 81   CALCIUM 10.0   AST 23   ALT 12   ALKPHOS 88   BILITOT 0.6   PROT 7.1   ALBUMIN 3.4*     Recent Labs   Lab 11/12/18  1342   INR 1.1   No results for input(s): PH, PCO2, PO2, HCO3 in the last 72 hours.  Recent Labs   Lab 11/12/18  1347   COLORU Yellow   APPEARANCEUA Clear   PHUR 7.0   SPECGRAV 1.010   OCCULTUA Trace*   LEUKOCYTESUR Negative   NITRITE Negative   PROTEINUA Trace*   GLUCUA Negative   KETONESU Negative   BILIRUBINUA Negative   UROBILINOGEN Negative     No results for input(s): LABURIN in the last 168 hours.No results for input(s): LABBLOO in the last 168 hours.  No results for input(s): LABURIN, LABBLOO in the last 168 hours.    DIAGNOSTIC STUDIES:  Images demonstrate a right subcapital femoral neck fracture with displacement.    ASSESSMENT:     Adilia Rosas is a 83 y.o. female who is currently being admitted for Fall on same level from slipping, tripping and stumbling without subsequent striking against object, initial encounter [W01.0XXA].      PLAN:  · Admit to Family Medicine  · Pt will need surgical clearance prior to operative management in this frail woman with a complicated medical history  · Anticoagulation per medicine in marry of pt with bilateral DVT currently being treated with plavix  · Plan for operative management once patient medically optimized, right hip hemiarthroplasty.  · NPO at midnight      Anuj Ventura MD     I agree with findings outlined by the resident. I have recommended right cemented bipolar hemiarthroplasty. We will plan to proceed today.

## 2018-11-12 NOTE — H&P
[facility]  General Surgery  History & Physical    Patient Name: Adilia Rosas  MRN: [id number]  Admission Date: 11/12/2018  Attending Physician: Oksana Sow MD  Primary Care Provider: Ely Dubois MD     Subjective:     Chief Complaint: Hip Fracture     History of Present Illness:  Patient is a 83 y.o. female who presents to to the ED as a transfer from [facility] 2/2 to lack of orthopedic coverage.. Patient stood up from her chair and fell on to her right side when attempting to take a step. Patient denies any dizziness or LOC prior to fall or any head trauma, Patient was found to have a right femoral neck fracture. At presentation patient denied any HA, fever, chills, chest pain, SOB. Patient now continues to endorses right hip pain, rated 7/10, does not radiate, localized to site of injury with no loss of sensation.     No current facility-administered medications on file prior to encounter.      Current Outpatient Medications on File Prior to Encounter   Medication Sig    albuterol 90 mcg/actuation inhaler Inhale 2 puffs into the lungs every 4 (four) hours as needed for Wheezing.    amlodipine (NORVASC) 10 MG tablet Take 10 mg by mouth every evening.    ascorbic acid, vitamin C, (VITAMIN C) 500 MG tablet Take 500 mg by mouth once daily.    atorvastatin (LIPITOR) 80 MG tablet Take 80 mg by mouth once daily.    benazepril (LOTENSIN) 20 MG tablet Take 20 mg by mouth once daily.    bisacodyl (DULCOLAX) 10 mg Supp Place 1 suppository (10 mg total) rectally daily as needed (Until bowel movement if patient has no bowel movement for 2 days).    clopidogrel (PLAVIX) 75 mg tablet Take 75 mg by mouth once daily.    ferrous gluconate (FERGON) 325 MG Tab Take 325 mg by mouth daily with breakfast.    hydrocodone-acetaminophen 5-325mg (NORCO) 5-325 mg per tablet Take 2 tablets by mouth every 4 (four) hours as needed.    lubiprostone (AMITIZA) 24 MCG Cap Take 24 mcg by mouth 2  (two) times daily with meals.    magnesium 200 mg Tab Take 400 mg by mouth 2 (two) times daily.    metoprolol tartrate (LOPRESSOR) 50 MG tablet Take 50 mg by mouth 2 (two) times daily.    mirtazapine (REMERON) 15 MG tablet Take 15 mg by mouth every evening.    oxycodone-acetaminophen (PERCOCET) 5-325 mg per tablet Take 1 tablet by mouth every 4 (four) hours as needed for Pain.    pantoprazole (PROTONIX) 40 MG tablet Take 40 mg by mouth 2 (two) times daily.    prednisoLONE acetate (PRED FORTE) 1 % DrpS 1 drop 4 (four) times daily.    senna-docusate 8.6-50 mg (PERICOLACE) 8.6-50 mg per tablet Take 1 tablet by mouth once daily.    vitamin D 1000 units Tab Take 370 mg by mouth once daily.       Review of patient's allergies indicates:   Allergen Reactions    Celebrex [celecoxib] Other (See Comments)     Pain down her spine       Past Medical History:   Diagnosis Date    Arthritis     Elevated cholesterol     GERD (gastroesophageal reflux disease)     History of DVT (deep vein thrombosis) 2016    RUE    Hypertension      Past Surgical History:   Procedure Laterality Date    APPLICATION-CAST LONG LEG Left 3/10/2017    [facility]formed [facility]    APPLICATION-EXTERNAL FIXATION DEVICE; knee spanning Left 12/27/2016    [facility]formed by [facility] at [facility]    EXPLORATORY-LAPAROTOMY, possible bowel resection N/A 9/19/2016    Performed by [facility] at [facility]    HERNIA REPAIR      HYSTERECTOMY      IRRIGATION AND DEBRIDEMENT LOWER EXTREMITY Left 1/7/2017    Performed by [facility] at [facility]    IRRIGATION AND DEBRIDEMENT LOWER EXTREMITY; placement of antibiotic beads Left 12/27/2016    [facility]formed by [facility] at [facility]    IRRIGATION AND DEBRIDEMENT LOWER EXTREMITY; possible antibiotic bead placement; possible wound vac Left 12/26/2016    Performed by [facility] at [facility]    LAPAROSCOPY-DIAGNOSTIC N/A 9/19/2016    Performed by Katie [facility]IL[facility]IRA  [facility]    LYSIS-ADHESION N/A 9/19/2016    [facility] by [facility] at [facility]    OPEN REDUCTION INTERNAL FIXATION-FEMUR Left 12/9/2016    [facility]formed by [facility] at [facility]    ORIF FEMUR FRACTURE Left 12/2016    PLACEMENT SPACER-ANTIBIOTIC Left 1/7/2017    [facility]formed by [facility] at [facility]    PLACEMENT SPACER-ANTIBIOTIC Left 12/27/2016    Performed by [facility] at [facility]    REMOVAL OF EXTERNAL FIXATION DEVICE Left 3/10/2017    Performed by [facility] at [facility]    REMOVAL-HARDWARE-LEG Left 12/27/2016    Performed by [facility] at [facility] [place]    REMOVAL-SUTURE Left 3/10/2017    Performed by Jose [facility] at [facility] [place]     Family History     None        Tobacco Use    Smoking status: Never Smoker   Substance and Sexual Activity    Alcohol use: No    Drug use: No    Sexual activity: Not Currently     Review of Systems   Constitutional: Negative for chills, diaphoresis, fatigue and fever.   HENT: Negative for voice change.    Eyes: Negative for photophobia.   Respiratory: Negative for chest tightness and shortness of breath.    Cardiovascular: Negative for chest pain and palpitations.   Gastrointestinal: Negative for abdominal pain, nausea and vomiting.   Endocrine: Negative for polyuria.   Genitourinary: Negative for hematuria.   Musculoskeletal: Positive for arthralgias, gait problem and joint swelling.   Skin: Negative for color change.   Allergic/Immunologic: Negative for food allergies.   Neurological: Negative for numbness and headaches.   Hematological: Negative for adenopathy.   Psychiatric/Behavioral: Negative for agitation.     Objective:     Vital Signs (Most Recent):  Temp: 97.9 °F (36.6 °C) (11/12/18 1238)  Pulse: 66 (11/12/18 1302)  Resp: 16 (11/12/18 1303)  BP: (!) 178/92 (11/12/18 1302)  SpO2: 97 % (11/12/18 1302) Vital Signs (24h Range):  Temp:  [97.9 °F (36.6 °C)-98.1 °F (36.7 °C)] 97.9 °F (36.6 °C)  Pulse:   [63-74] 66  Resp:  [14-18] 16  SpO2:  [96 %-99 %] 97 %  BP: (166-189)/(87-95) 178/92     Weight: 76.7 kg (169 lb)  Body mass index is 29.01 kg/m².    Physical Exam   Constitutional: She is oriented to person, place, and time. She appears well-developed and well-nourished.   HENT:   Head: Normocephalic and atraumatic.   Eyes: Conjunctivae and EOM are normal. Pupils are equal, round, and reactive to light.   Neck: Normal range of motion. Neck supple.   Cardiovascular: Normal rate. Exam reveals gallop.   s4 noted   Pulmonary/Chest: Effort normal and breath sounds normal. She has no wheezes.   Abdominal: Soft. Bowel sounds are normal.   Musculoskeletal: She exhibits tenderness and deformity. She exhibits no edema.   Right leg externally rotated. Tender to palpation.     Neurological: She is alert and oriented to person, place, and time.   Skin: Skin is warm and dry. Capillary refill takes less than 2 seconds.   No erythema overlying the right hip fracture injury. Patient with venous stasis changes at the left lower extremity.    Psychiatric: She has a normal mood and affect.     Significant Labs:  Recent Labs   Lab 11/12/18  1342   WBC 8.73   RBC 3.70*   HGB 10.8*   HCT 35.4*      MCV 96   MCH 29.2   MCHC 30.5*     Recent Labs   Lab 11/12/18  1342   CALCIUM 10.0   PROT 7.1      K 5.0   CO2 17*   *   BUN 26*   CREATININE 2.0*   ALKPHOS 88   ALT 12   AST 23   BILITOT 0.6       Significant Diagnostics:    Assessment/Plan:   84 y/o female with:    Right femoral neck fracture   -Diagnosed by xray   -Right Leg externally rotated on exam   -No notable neurologic deficits   -Popliteal and dorsalis pedis pulses intact  -Orthopedics consulted  -Continue Pain control as tolerated     Heart Failure with preserved EF   -Previous TTE shows diastolic dysfunction. EF 55-60%. (9/2016)   -Will repeat TTE today.   -EKG today   -Patient was supposed to have a stress test today prior to hip fracture.     HTN  -Home  regimen: Hydralizine 50mg PO BID, Isosorbide Mono-nitrate 60mg PO qHs, Lisinopril 5mg PO daily, Carvedilol 6.25mg PO BID, Amlodipine 10mg PO daily. Eliquis 10mg PO BID and Atorvastatin 80mg PO BID  -Will adjust accordingly following assement of vitals and fluid status       CKD Grade 4  GFR 26   Bun 26/Creatinine 2.0  Will continue to monitor  Will consult nephrology - Preparation for future dialysis.     GERD  -Continue PPI      Arthritis  -Tylenol PRN, patient non-ambulatory currently.       PPx: SCDs /PENELOPE hose  Code: Full   Dispo: Pending Hip fracture repair. Likely requiring PT/OT eval following surgery.       Randy Schneider MD  Family Medicine   [facility]

## 2018-11-12 NOTE — PLAN OF CARE
Accompanied by daughter and grand daughter.    Past and current Hx of femur fracture, transferred from [facility] ED, if SNF is destination would like to return to a swing bed there.    Patient is awake and alert and pleasant.    Post acute choice list for SNF given.       11/12/18 4754   Discharge Assessment   Assessment Type Discharge Planning Assessment   Assessment information obtained from? Patient   Prior to hospitilization cognitive status: Alert/Oriented   Prior to hospitalization functional status: Assistive Equipment   Current cognitive status: Alert/Oriented   Current Functional Status: Assistive Equipment;Needs Assistance   Lives With grandchild(francy)   Able to Return to Prior Arrangements unable to determine at this time (comments)   Is patient able to care for self after discharge? Unable to determine at this time (comments)   Who are your caregiver(s) and their phone number(s)? Joseph Tan Grandchild     [phone] lives with    Patient's perception of discharge disposition admitted as an inpatient   Readmission Within The Last 30 Days no previous admission in last 30 days   Patient currently being followed by outpatient case management? No   Patient currently receives any other outside agency services? No   Equipment Currently Used at Home rollator;shower chair;bedside commode;wheelchair   Do you have any problems affording any of your prescribed medications? No   Is the patient taking medications as prescribed? yes   Does the patient have transportation home? Yes   Transportation Available family or friend will provide   Dialysis Name and Scheduled days n/a   Does the patient receive services at the [facility]? No   Discharge Plan A Skilled Nursing Facility

## 2018-11-12 NOTE — ED PROVIDER NOTES
Encounter Date: 11/12/2018       History     Chief Complaint   Patient presents with    Fall     pt was transferred from [facility] for treatment of right femur fracture. Pt states she fell this morning after standing up to go to the bathroom.      83-year-old female brought to the emergency department by EMS from [facility].  Patient reports that she had gotten up to go to the bathroom early this morning and slipped, falling on her right hip.  EMS brought her to [facility], where x-rays revealed a right femoral neck fracture.  [facility] does not have orthopedic coverage today, and transfer center arranged for the patient to be sent here because the patient has been seen and operated on by Dr. Dent with [facility].  She was sent to the ER because we did not have any beds available at the time of transfer.  Patient reports she is having some persistent right hip pain described as aching and diffuse, worse with any movement and better with rest.  Denies any focal numbness or weakness.  Denies any chest pain or shortness of breath.  Reports she has not had anything to eat or drink since yesterday because she was scheduled for a stress test this morning.          Review of patient's allergies indicates:   Allergen Reactions    Celebrex [celecoxib] Other (See Comments)     Pain down her spine     Past Medical History:   Diagnosis Date    Arthritis     Elevated cholesterol     GERD (gastroesophageal reflux disease)     History of DVT (deep vein thrombosis) 2016    RUE    Hypertension      Past Surgical History:   Procedure Laterality Date    APPLICATION-CAST LONG LEG Left 3/10/2017    [facility]    APPLICATION-EXTERNAL FIXATION DEVICE; knee spanning Left 12/27/2016    [facility] by [facility] at [facility]    EXPLORATORY-LAPAROTOMY, possible bowel resection N/A 9/19/2016    Performed by Katie SILVEIRA  MD [facility] at [facility] OR    HERNIA REPAIR      HYSTERECTOMY      IRRIGATION AND DEBRIDEMENT LOWER EXTREMITY Left 1/7/2017    Performed by [facility] at [facility] OR    IRRIGATION AND DEBRIDEMENT LOWER EXTREMITY; placement of antibiotic beads Left 12/27/2016    Performed by [facility] at [facility] OR    IRRIGATION AND DEBRIDEMENT LOWER EXTREMITY; possible antibiotic bead placement; possible wound vac Left 12/26/2016    Performed by Jose Dent [facility] at [facility] OR    LAPAROSCOPY-DIAGNOSTIC N/A 9/19/2016    Performed by Katie Solares MD at [facility] OR    LYSIS-ADHESION N/A 9/19/2016    Performed by [facility] at [facility] OR    OPEN REDUCTION INTERNAL FIXATION-FEMUR Left 12/9/2016    Performed by [facility] at [facility] OR    ORIF FEMUR FRACTURE Left 12/2016    PLACEMENT SPACER-ANTIBIOTIC Left 1/7/2017    Performed by [facility] at [facility] OR    PLACEMENT SPACER-ANTIBIOTIC Left 12/27/2016    Performed by Jose Dent [facility] at [facility] OR    REMOVAL OF EXTERNAL FIXATION DEVICE Left 3/10/2017    Performed by [facility] at [facility] OR    REMOVAL-HARDWARE-LEG Left 12/27/2016    Performed by [facility] at [facility] OR    REMOVAL-SUTURE Left 3/10/2017    Performed by [facility] at [facility] OR     No family history on file.  Social History     Tobacco Use    Smoking status: Never Smoker   Substance Use Topics    Alcohol use: No    Drug use: No     Review of Systems   Constitutional: Negative for chills, fatigue and fever.   HENT: Negative for congestion, sore throat and voice change.    Eyes: Negative for photophobia, pain and redness.   Respiratory: Negative for cough, choking and shortness of breath.    Cardiovascular: Negative for chest pain, palpitations and leg swelling.   Gastrointestinal: Negative for abdominal pain, diarrhea, nausea and vomiting.   Genitourinary: Negative for dysuria, frequency and urgency.   Musculoskeletal: Negative for back pain, neck pain  and neck stiffness.   Neurological: Negative for seizures, speech difficulty, light-headedness, numbness and headaches.   All other systems reviewed and are negative.      Physical Exam     Initial Vitals [11/12/18 1238]   BP Pulse Resp Temp SpO2   (!) 189/91 69 18 97.9 °F (36.6 °C) 96 %      MAP       --         Physical Exam    Nursing note and vitals reviewed.  Constitutional: She appears well-developed and well-nourished. No distress.   HENT:   Head: Normocephalic and atraumatic.   Mouth/Throat: Oropharynx is clear and moist.   Eyes: Conjunctivae and EOM are normal. Pupils are equal, round, and reactive to light.   Neck: Normal range of motion. Neck supple. No tracheal deviation present.   Cardiovascular: Normal rate, regular rhythm, normal heart sounds and intact distal pulses.   2+ DP B/L   Pulmonary/Chest: Breath sounds normal. No respiratory distress. She has no wheezes. She has no rhonchi. She has no rales.   Abdominal: Soft. Bowel sounds are normal. She exhibits no distension. There is no tenderness.   Musculoskeletal: She exhibits edema (Trace pitting edema to B/L LE) and tenderness.   +TTP to right hip; Pelvis stable to compression   Neurological: She is alert and oriented to person, place, and time. She has normal strength. No cranial nerve deficit or sensory deficit.   Skin: Skin is warm and dry. Capillary refill takes less than 2 seconds.         ED Course   Procedures  Labs Reviewed   CBC W/ AUTO DIFFERENTIAL   COMPREHENSIVE METABOLIC PANEL   APTT   PROTIME-INR   URINALYSIS     EKG Readings: (Independently Interpreted)   Initial Reading: No STEMI. Previous EKG: Compared with most recent EKG Previous EKG Date: 10/16/18 (Nonspecific changes). Rhythm: Normal Sinus Rhythm. Heart Rate: 68. Ectopy: No Ectopy. Conduction: Nonspecific interventricular block. ST Segments: Normal ST Segments. T Waves: Normal. Axis: Normal. Clinical Impression: Sinus Bradycardia Other Impression: Diffuse T-wave flattening        Imaging Results    None       X-Rays:   Independently Interpreted Readings:   Other Readings:  X-ray from outside hospital and visualized by me, consistent with right femoral neck fracture    Medical Decision Making:   Initial Assessment:   83-year-old female sent to the emergency department from [facility] for further management evaluation a right femoral neck fracture  Differential Diagnosis:   Fracture, dislocation, contusion, sprain, strain  Independently Interpreted Test(s):   I have ordered and independently interpreted X-rays - see prior notes.  I have ordered and independently interpreted EKG Reading(s) - see prior notes  Clinical Tests:   Lab Tests: Reviewed  ED Management:  Patient given some morphine and Zofran for pain control in the department.  Discussed the case with [facility] and [facility], who will see and admit the patient.                      Clinical Impression:   The primary encounter diagnosis was Fall on same level from slipping, tripping and stumbling without subsequent striking against object, initial encounter. Diagnoses of Femoral neck fracture and Closed fracture of neck of right femur, initial encounter were also pertinent to this visit.      Disposition:   Disposition: Admitted  Condition: Stable                        Reese Pacheco MD  11/12/18 9704

## 2018-11-12 NOTE — ED NOTES
Patient ED to ED from [facility] . Patient a/ox4, rr even unlabored, vss, color appropriate, skin warm dry, right leg external rotation, pedal pulses present. Allergy and fall rixk bracelet placed. Placed on monitor. Orders noted. Placed on monitor

## 2018-11-13 ENCOUNTER — ANESTHESIA EVENT (OUTPATIENT)
Dept: SURGERY | Facility: HOSPITAL | Age: 83
DRG: 469 | End: 2018-11-13
Payer: MEDICARE

## 2018-11-13 ENCOUNTER — ANESTHESIA (OUTPATIENT)
Dept: SURGERY | Facility: HOSPITAL | Age: 83
DRG: 469 | End: 2018-11-13
Payer: MEDICARE

## 2018-11-13 LAB
ABO + RH BLD: NORMAL
ALBUMIN SERPL BCP-MCNC: 3.1 G/DL
ALBUMIN SERPL BCP-MCNC: 3.2 G/DL
ALP SERPL-CCNC: 78 U/L
ALP SERPL-CCNC: 86 U/L
ALT SERPL W/O P-5'-P-CCNC: 10 U/L
ALT SERPL W/O P-5'-P-CCNC: 11 U/L
ANION GAP SERPL CALC-SCNC: 7 MMOL/L
ANION GAP SERPL CALC-SCNC: 9 MMOL/L
APTT BLDCRRT: 29.9 SEC
AST SERPL-CCNC: 19 U/L
AST SERPL-CCNC: 22 U/L
BASOPHILS # BLD AUTO: 0.02 K/UL
BASOPHILS NFR BLD: 0.3 %
BILIRUB SERPL-MCNC: 0.6 MG/DL
BILIRUB SERPL-MCNC: 0.7 MG/DL
BLD GP AB SCN CELLS X3 SERPL QL: NORMAL
BNP SERPL-MCNC: 717 PG/ML
BUN SERPL-MCNC: 27 MG/DL
BUN SERPL-MCNC: 27 MG/DL
CALCIUM SERPL-MCNC: 10.1 MG/DL
CALCIUM SERPL-MCNC: 9.8 MG/DL
CHLORIDE SERPL-SCNC: 112 MMOL/L
CHLORIDE SERPL-SCNC: 115 MMOL/L
CO2 SERPL-SCNC: 17 MMOL/L
CO2 SERPL-SCNC: 18 MMOL/L
CREAT SERPL-MCNC: 1.8 MG/DL
CREAT SERPL-MCNC: 1.9 MG/DL
DIFFERENTIAL METHOD: ABNORMAL
EOSINOPHIL # BLD AUTO: 0.3 K/UL
EOSINOPHIL NFR BLD: 5.2 %
ERYTHROCYTE [DISTWIDTH] IN BLOOD BY AUTOMATED COUNT: 14.8 %
EST. GFR  (AFRICAN AMERICAN): 28 ML/MIN/1.73 M^2
EST. GFR  (AFRICAN AMERICAN): 30 ML/MIN/1.73 M^2
EST. GFR  (NON AFRICAN AMERICAN): 24 ML/MIN/1.73 M^2
EST. GFR  (NON AFRICAN AMERICAN): 26 ML/MIN/1.73 M^2
GLUCOSE SERPL-MCNC: 78 MG/DL
GLUCOSE SERPL-MCNC: 84 MG/DL
HCT VFR BLD AUTO: 36.6 %
HGB BLD-MCNC: 11.1 G/DL
INR PPP: 1.1
LYMPHOCYTES # BLD AUTO: 0.9 K/UL
LYMPHOCYTES NFR BLD: 15.7 %
MAGNESIUM SERPL-MCNC: 1.4 MG/DL
MCH RBC QN AUTO: 29.5 PG
MCHC RBC AUTO-ENTMCNC: 30.3 G/DL
MCV RBC AUTO: 97 FL
MONOCYTES # BLD AUTO: 0.4 K/UL
MONOCYTES NFR BLD: 6.2 %
NEUTROPHILS # BLD AUTO: 4.2 K/UL
NEUTROPHILS NFR BLD: 72.4 %
PHOSPHATE SERPL-MCNC: 2.7 MG/DL
PLATELET # BLD AUTO: 161 K/UL
PMV BLD AUTO: 11 FL
POCT GLUCOSE: 101 MG/DL (ref 70–110)
POCT GLUCOSE: 83 MG/DL (ref 70–110)
POTASSIUM SERPL-SCNC: 5.4 MMOL/L
POTASSIUM SERPL-SCNC: 5.5 MMOL/L
PROT SERPL-MCNC: 6.5 G/DL
PROT SERPL-MCNC: 7 G/DL
PROTHROMBIN TIME: 11.4 SEC
RBC # BLD AUTO: 3.76 M/UL
SODIUM SERPL-SCNC: 139 MMOL/L
SODIUM SERPL-SCNC: 139 MMOL/L
TROPONIN I SERPL DL<=0.01 NG/ML-MCNC: 0.01 NG/ML
TROPONIN I SERPL DL<=0.01 NG/ML-MCNC: 0.01 NG/ML
TROPONIN I SERPL DL<=0.01 NG/ML-MCNC: 0.02 NG/ML
WBC # BLD AUTO: 5.81 K/UL

## 2018-11-13 PROCEDURE — 36000710: Performed by: ORTHOPAEDIC SURGERY

## 2018-11-13 PROCEDURE — C1713 ANCHOR/SCREW BN/BN,TIS/BN: HCPCS | Performed by: ORTHOPAEDIC SURGERY

## 2018-11-13 PROCEDURE — 63600175 PHARM REV CODE 636 W HCPCS: Performed by: STUDENT IN AN ORGANIZED HEALTH CARE EDUCATION/TRAINING PROGRAM

## 2018-11-13 PROCEDURE — 11000001 HC ACUTE MED/SURG PRIVATE ROOM

## 2018-11-13 PROCEDURE — 63600175 PHARM REV CODE 636 W HCPCS: Performed by: NURSE ANESTHETIST, CERTIFIED REGISTERED

## 2018-11-13 PROCEDURE — 36415 COLL VENOUS BLD VENIPUNCTURE: CPT

## 2018-11-13 PROCEDURE — 83735 ASSAY OF MAGNESIUM: CPT

## 2018-11-13 PROCEDURE — 85025 COMPLETE CBC W/AUTO DIFF WBC: CPT

## 2018-11-13 PROCEDURE — S5010 5% DEXTROSE AND 0.45% SALINE: HCPCS | Performed by: STUDENT IN AN ORGANIZED HEALTH CARE EDUCATION/TRAINING PROGRAM

## 2018-11-13 PROCEDURE — C1776 JOINT DEVICE (IMPLANTABLE): HCPCS | Performed by: ORTHOPAEDIC SURGERY

## 2018-11-13 PROCEDURE — 37000009 HC ANESTHESIA EA ADD 15 MINS: Performed by: ORTHOPAEDIC SURGERY

## 2018-11-13 PROCEDURE — 84484 ASSAY OF TROPONIN QUANT: CPT

## 2018-11-13 PROCEDURE — 25000003 PHARM REV CODE 250: Performed by: NURSE ANESTHETIST, CERTIFIED REGISTERED

## 2018-11-13 PROCEDURE — 25000003 PHARM REV CODE 250: Performed by: STUDENT IN AN ORGANIZED HEALTH CARE EDUCATION/TRAINING PROGRAM

## 2018-11-13 PROCEDURE — 63600175 PHARM REV CODE 636 W HCPCS: Performed by: NURSE PRACTITIONER

## 2018-11-13 PROCEDURE — 86850 RBC ANTIBODY SCREEN: CPT

## 2018-11-13 PROCEDURE — 83880 ASSAY OF NATRIURETIC PEPTIDE: CPT

## 2018-11-13 PROCEDURE — P9047 ALBUMIN (HUMAN), 25%, 50ML: HCPCS | Mod: JG | Performed by: NURSE ANESTHETIST, CERTIFIED REGISTERED

## 2018-11-13 PROCEDURE — 84484 ASSAY OF TROPONIN QUANT: CPT | Mod: 91

## 2018-11-13 PROCEDURE — 99223 1ST HOSP IP/OBS HIGH 75: CPT | Mod: ,,, | Performed by: INTERNAL MEDICINE

## 2018-11-13 PROCEDURE — 27201423 OPTIME MED/SURG SUP & DEVICES STERILE SUPPLY: Performed by: ORTHOPAEDIC SURGERY

## 2018-11-13 PROCEDURE — 85610 PROTHROMBIN TIME: CPT

## 2018-11-13 PROCEDURE — 25000003 PHARM REV CODE 250: Performed by: FAMILY MEDICINE

## 2018-11-13 PROCEDURE — 93005 ELECTROCARDIOGRAM TRACING: CPT

## 2018-11-13 PROCEDURE — 63600175 PHARM REV CODE 636 W HCPCS: Performed by: FAMILY MEDICINE

## 2018-11-13 PROCEDURE — 63600175 PHARM REV CODE 636 W HCPCS: Mod: JG | Performed by: NURSE ANESTHETIST, CERTIFIED REGISTERED

## 2018-11-13 PROCEDURE — 71000033 HC RECOVERY, INTIAL HOUR: Performed by: ORTHOPAEDIC SURGERY

## 2018-11-13 PROCEDURE — 27800903 OPTIME MED/SURG SUP & DEVICES OTHER IMPLANTS: Performed by: ORTHOPAEDIC SURGERY

## 2018-11-13 PROCEDURE — 37000008 HC ANESTHESIA 1ST 15 MINUTES: Performed by: ORTHOPAEDIC SURGERY

## 2018-11-13 PROCEDURE — 36000711: Performed by: ORTHOPAEDIC SURGERY

## 2018-11-13 PROCEDURE — 85730 THROMBOPLASTIN TIME PARTIAL: CPT

## 2018-11-13 PROCEDURE — 84100 ASSAY OF PHOSPHORUS: CPT

## 2018-11-13 PROCEDURE — 94761 N-INVAS EAR/PLS OXIMETRY MLT: CPT

## 2018-11-13 PROCEDURE — 99900035 HC TECH TIME PER 15 MIN (STAT)

## 2018-11-13 PROCEDURE — 80053 COMPREHEN METABOLIC PANEL: CPT | Mod: 91

## 2018-11-13 PROCEDURE — 0SRR019 REPLACEMENT OF RIGHT HIP JOINT, FEMORAL SURFACE WITH METAL SYNTHETIC SUBSTITUTE, CEMENTED, OPEN APPROACH: ICD-10-PCS | Performed by: ORTHOPAEDIC SURGERY

## 2018-11-13 PROCEDURE — 80053 COMPREHEN METABOLIC PANEL: CPT

## 2018-11-13 DEVICE — HEAD FEM BPLR UNIV UHR 28X47MM: Type: IMPLANTABLE DEVICE | Site: HIP | Status: FUNCTIONAL

## 2018-11-13 DEVICE — SPACER CEMENT DISTAL 10MM: Type: IMPLANTABLE DEVICE | Site: HIP | Status: FUNCTIONAL

## 2018-11-13 DEVICE — STEM OMNIFIT EON 132DEG SZ6: Type: IMPLANTABLE DEVICE | Site: HIP | Status: FUNCTIONAL

## 2018-11-13 DEVICE — CEMENT BONE SMPLX HV GENTMYCN: Type: IMPLANTABLE DEVICE | Site: HIP | Status: FUNCTIONAL

## 2018-11-13 DEVICE — HEAD +0: Type: IMPLANTABLE DEVICE | Site: HIP | Status: FUNCTIONAL

## 2018-11-13 RX ORDER — ONDANSETRON HYDROCHLORIDE 2 MG/ML
INJECTION, SOLUTION INTRAMUSCULAR; INTRAVENOUS
Status: DISCONTINUED | OUTPATIENT
Start: 2018-11-13 | End: 2018-11-13

## 2018-11-13 RX ORDER — ALBUTEROL SULFATE 2.5 MG/.5ML
5 SOLUTION RESPIRATORY (INHALATION) EVERY 4 HOURS PRN
Status: DISCONTINUED | OUTPATIENT
Start: 2018-11-13 | End: 2018-11-16

## 2018-11-13 RX ORDER — ENOXAPARIN SODIUM 100 MG/ML
30 INJECTION SUBCUTANEOUS EVERY 24 HOURS
Status: DISCONTINUED | OUTPATIENT
Start: 2018-11-13 | End: 2018-11-19 | Stop reason: HOSPADM

## 2018-11-13 RX ORDER — ROCURONIUM BROMIDE 10 MG/ML
INJECTION, SOLUTION INTRAVENOUS
Status: DISCONTINUED | OUTPATIENT
Start: 2018-11-13 | End: 2018-11-13

## 2018-11-13 RX ORDER — TRANEXAMIC ACID 100 MG/ML
INJECTION, SOLUTION INTRAVENOUS
Status: DISCONTINUED | OUTPATIENT
Start: 2018-11-13 | End: 2018-11-13

## 2018-11-13 RX ORDER — ALBUMIN HUMAN 250 G/1000ML
SOLUTION INTRAVENOUS CONTINUOUS PRN
Status: DISCONTINUED | OUTPATIENT
Start: 2018-11-13 | End: 2018-11-13

## 2018-11-13 RX ORDER — LIDOCAINE HCL/PF 100 MG/5ML
SYRINGE (ML) INTRAVENOUS
Status: DISCONTINUED | OUTPATIENT
Start: 2018-11-13 | End: 2018-11-13

## 2018-11-13 RX ORDER — ACETAMINOPHEN 10 MG/ML
INJECTION, SOLUTION INTRAVENOUS
Status: DISCONTINUED | OUTPATIENT
Start: 2018-11-13 | End: 2018-11-13

## 2018-11-13 RX ORDER — GLYCOPYRROLATE 0.2 MG/ML
INJECTION INTRAMUSCULAR; INTRAVENOUS
Status: DISCONTINUED | OUTPATIENT
Start: 2018-11-13 | End: 2018-11-13

## 2018-11-13 RX ORDER — SODIUM CHLORIDE 9 MG/ML
INJECTION, SOLUTION INTRAVENOUS CONTINUOUS PRN
Status: DISCONTINUED | OUTPATIENT
Start: 2018-11-13 | End: 2018-11-13

## 2018-11-13 RX ORDER — ENOXAPARIN SODIUM 100 MG/ML
40 INJECTION SUBCUTANEOUS EVERY 24 HOURS
Status: DISCONTINUED | OUTPATIENT
Start: 2018-11-13 | End: 2018-11-13

## 2018-11-13 RX ORDER — CEFAZOLIN SODIUM 2 G/50ML
2 SOLUTION INTRAVENOUS
Status: COMPLETED | OUTPATIENT
Start: 2018-11-13 | End: 2018-11-14

## 2018-11-13 RX ORDER — LANOLIN ALCOHOL/MO/W.PET/CERES
400 CREAM (GRAM) TOPICAL
Status: DISPENSED | OUTPATIENT
Start: 2018-11-13 | End: 2018-11-14

## 2018-11-13 RX ORDER — FUROSEMIDE 10 MG/ML
20 INJECTION INTRAMUSCULAR; INTRAVENOUS ONCE
Status: COMPLETED | OUTPATIENT
Start: 2018-11-13 | End: 2018-11-13

## 2018-11-13 RX ORDER — NEOSTIGMINE METHYLSULFATE 1 MG/ML
INJECTION, SOLUTION INTRAVENOUS
Status: DISCONTINUED | OUTPATIENT
Start: 2018-11-13 | End: 2018-11-13

## 2018-11-13 RX ORDER — ETOMIDATE 2 MG/ML
INJECTION INTRAVENOUS
Status: DISCONTINUED | OUTPATIENT
Start: 2018-11-13 | End: 2018-11-13

## 2018-11-13 RX ORDER — HYDROCODONE BITARTRATE AND ACETAMINOPHEN 10; 325 MG/1; MG/1
1 TABLET ORAL EVERY 6 HOURS
Status: DISCONTINUED | OUTPATIENT
Start: 2018-11-13 | End: 2018-11-19 | Stop reason: HOSPADM

## 2018-11-13 RX ORDER — FENTANYL CITRATE 50 UG/ML
INJECTION, SOLUTION INTRAMUSCULAR; INTRAVENOUS
Status: DISCONTINUED | OUTPATIENT
Start: 2018-11-13 | End: 2018-11-13

## 2018-11-13 RX ADMIN — FENTANYL CITRATE 50 MCG: 50 INJECTION, SOLUTION INTRAMUSCULAR; INTRAVENOUS at 04:11

## 2018-11-13 RX ADMIN — GLYCOPYRROLATE 0.4 MG: 0.2 INJECTION, SOLUTION INTRAMUSCULAR; INTRAVENOUS at 07:11

## 2018-11-13 RX ADMIN — FENTANYL CITRATE 25 MCG: 50 INJECTION, SOLUTION INTRAMUSCULAR; INTRAVENOUS at 06:11

## 2018-11-13 RX ADMIN — ONDANSETRON 8 MG: 2 INJECTION, SOLUTION INTRAMUSCULAR; INTRAVENOUS at 06:11

## 2018-11-13 RX ADMIN — VASOPRESSIN 1 UNITS: 20 INJECTION, SOLUTION INTRAMUSCULAR; SUBCUTANEOUS at 05:11

## 2018-11-13 RX ADMIN — MORPHINE SULFATE 2 MG: 2 INJECTION, SOLUTION INTRAMUSCULAR; INTRAVENOUS at 04:11

## 2018-11-13 RX ADMIN — NEOSTIGMINE METHYLSULFATE 2.5 MG: 1 INJECTION INTRAVENOUS at 07:11

## 2018-11-13 RX ADMIN — HYDROCODONE BITARTRATE AND ACETAMINOPHEN 1 TABLET: 10; 325 TABLET ORAL at 09:11

## 2018-11-13 RX ADMIN — ALBUMIN (HUMAN): 25 SOLUTION INTRAVENOUS at 04:11

## 2018-11-13 RX ADMIN — MAGNESIUM OXIDE TAB 400 MG (241.3 MG ELEMENTAL MG) 400 MG: 400 (241.3 MG) TAB at 09:11

## 2018-11-13 RX ADMIN — SODIUM CHLORIDE: 0.9 INJECTION, SOLUTION INTRAVENOUS at 04:11

## 2018-11-13 RX ADMIN — FUROSEMIDE 20 MG: 10 INJECTION, SOLUTION INTRAMUSCULAR; INTRAVENOUS at 01:11

## 2018-11-13 RX ADMIN — ACETAMINOPHEN 1000 MG: 10 INJECTION, SOLUTION INTRAVENOUS at 06:11

## 2018-11-13 RX ADMIN — PANTOPRAZOLE SODIUM 40 MG: 40 TABLET, DELAYED RELEASE ORAL at 09:11

## 2018-11-13 RX ADMIN — VASOPRESSIN 2 UNITS: 20 INJECTION, SOLUTION INTRAMUSCULAR; SUBCUTANEOUS at 04:11

## 2018-11-13 RX ADMIN — DEXTROSE AND SODIUM CHLORIDE: 5; .45 INJECTION, SOLUTION INTRAVENOUS at 04:11

## 2018-11-13 RX ADMIN — TRANEXAMIC ACID 1000 MG: 100 INJECTION, SOLUTION INTRAVENOUS at 06:11

## 2018-11-13 RX ADMIN — ROCURONIUM BROMIDE 25 MG: 10 INJECTION, SOLUTION INTRAVENOUS at 04:11

## 2018-11-13 RX ADMIN — CEFAZOLIN SODIUM 2 G: 2 SOLUTION INTRAVENOUS at 09:11

## 2018-11-13 RX ADMIN — GLYCOPYRROLATE 0.2 MG: 0.2 INJECTION, SOLUTION INTRAMUSCULAR; INTRAVENOUS at 05:11

## 2018-11-13 RX ADMIN — TRANEXAMIC ACID 1000 MG: 100 INJECTION, SOLUTION INTRAVENOUS at 04:11

## 2018-11-13 RX ADMIN — AMLODIPINE BESYLATE 10 MG: 5 TABLET ORAL at 09:11

## 2018-11-13 RX ADMIN — ETOMIDATE 12 MG: 2 INJECTION, SOLUTION INTRAVENOUS at 04:11

## 2018-11-13 RX ADMIN — LIDOCAINE HYDROCHLORIDE 75 MG: 20 INJECTION, SOLUTION INTRAVENOUS at 04:11

## 2018-11-13 NOTE — INTERVAL H&P NOTE
The patient has been examined and the H&P has been reviewed:    I concur with the findings and changes have been noted since the H&P was written: The patient as well reports currently to be on plavix for bilateral DVT. this was passed on to the medicine team at this time. Lovenox was added to her medication list. plan for operative management this afternoon once cleared.    Anesthesia/Surgery risks, benefits and alternative options discussed and understood by patient/family.          Active Hospital Problems    Diagnosis  POA    *Fracture of femur [S72.90XA]  Yes    Fall on same level from slipping, tripping and stumbling without subsequent striking against object, initial encounter [W01.0XXA]  Yes    Acute on chronic combined systolic and diastolic congestive heart failure, NYHA class 4 [I50.43]  Yes    Chronic kidney disease (CKD) stage G3b/A1, moderately decreased glomerular filtration rate (GFR) between 30-44 mL/min/1.73 square meter and albuminuria creatinine ratio less than 30 mg/g [N18.3]  Yes    Gastroesophageal reflux disease [K21.9]  Yes    H/O myocardial ischemia [Z86.79]  Not Applicable    Essential hypertension [I10]  Yes      Resolved Hospital Problems   No resolved problems to display.   I agree with findings outlined by the resident.

## 2018-11-13 NOTE — PLAN OF CARE
Problem: Patient Care Overview  Goal: Plan of Care Review  Outcome: Ongoing (interventions implemented as appropriate)  AAOx3. Bed remains low, locked and call bell within reach. Patient verbalized understanding to call for any needs or assistance. PRN pain medication administered per MD orders. Patient remains NPO, plans to go to surgery this afternoon. IV fluids discontinued. One time dose of IV lasix administered. Will continue to monitor patient

## 2018-11-13 NOTE — CONSULTS
"[facility]  Cardiology  Consult Note    Patient Name: Adilia Rosas  MRN: [id number]  Admission Date: 11/12/2018  Hospital Length of Stay: 1 days  Code Status: Full Code   Attending Provider: Oksana Sow MD   Consulting Provider: BETHANY Cyr ANP  Primary Care Physician: Ely Dubois MD  Principal Problem:Fracture of femur    Patient information was obtained from patient, relative(s) and past medical records.     Inpatient consult to [facility]  Consult performed by: BETHANY Anaya ANP  Consult ordered by: Randy Schneider MD  Reason for consult: pre operative cardiac clearance and CHF         Subjective:     Chief Complaint:  Fall      HPI:   84yo female with history of chronic combined systolic and diastolic heart failure (EF 55% previously and down to 30% in 10/2018 during admission at [facility]), HTN, CKD stage III and GERD who presented to the ER at [facility] following a fall. She reports being in her USOH yesterday and was preparing to go to her stress test appointment. She stood from a chair and had not started walking and reports she fell over. She denies any chest pain, palpitations, dizziness or SOB prior to the episode. She states "I was standing and all I remember was going down". She denies any syncope or LOC. She was admitted last month to [facility] with ADHF related to omission of medication regimen. She was diuresed and discharged home a few days later. She reports feeling much better upon discharge and denies any chest pain or SOB since discharge. She is fairly active at home by doing light housework and grocery shopping with no complaints of chest pain or SOB. She has stairs in her house but is unable to climb them due to her previous fracture in 2017. She denies any orthopnea or PND and reports compliance with her medication regimen at home as well as her salt and fluid restriction     Hospital Course:    11/12/2018 Presented to the ER at [facility] following fall. " Xray with evidence of femur fracture and transferred to [facility] for orthopedic evaluation. Admitted to [facility] and placed on telemetry on 5th floor. Echocardiogram with moderately depressed LV function with EF 35-40%, grade I diastolic dysfunction, mild AI/MR/TR and WMA (akines to mid inferior wall and mid posterior wall). Previous echo at [facility] 10/15/2018 with LVEF 30-35% and no mention of WMA. BP elevated at 160s-180s/90s upon admission ?pain related  11/13/2018 K+ 5.5 creatinine 1.8  (baseline 1.6-1.9). BP down to 130s/70s HR 50s-60s with no arrhythmias noted on telemetry overnight. Troponin .023 and BNP pending. Cardiology consulted for preoperative clearance     Past Medical History:   Diagnosis Date    Arthritis     Elevated cholesterol     GERD (gastroesophageal reflux disease)     History of DVT (deep vein thrombosis) 2016    RUE    Hypertension        Past Surgical History:   Procedure Laterality Date    APPLICATION-CAST LONG LEG Left 3/10/2017    Performed by [facility]    APPLICATION-EXTERNAL FIXATION DEVICE; knee spanning Left 12/27/2016    Performed by Jose Dent [facility] at [facility]    EXPLORATORY-LAPAROTOMY, possible bowel resection N/A 9/19/2016    Performed by Katie Solares [facility] at [facility] OR    HERNIA REPAIR      HYSTERECTOMY      IRRIGATION AND DEBRIDEMENT LOWER EXTREMITY Left 1/7/2017    Performed by [facility] at [facility] OR    IRRIGATION AND DEBRIDEMENT LOWER EXTREMITY; placement of antibiotic beads Left 12/27/2016    Performed by [facility] at [facility]    IRRIGATION AND DEBRIDEMENT LOWER EXTREMITY; possible antibiotic bead placement; possible wound vac Left 12/26/2016    Performed by [facility] at [facility]    LAPAROSCOPY-DIAGNOSTIC N/A 9/19/2016    [facility]formed by [facility] at [facility]    LYSIS-ADHESION N/A 9/19/2016    [facility]formed by [facility] at [facility]    OPEN REDUCTION INTERNAL FIXATION-FEMUR Left  12/9/2016    Performed by [facility] at [facility] OR    ORIF FEMUR FRACTURE Left 12/2016    PLACEMENT SPACER-ANTIBIOTIC Left 1/7/2017    Performed by [facility] at [facility] OR    PLACEMENT SPACER-ANTIBIOTIC Left 12/27/2016    Performed by Jose Dent [facility] at [facility] OR    REMOVAL OF EXTERNAL FIXATION DEVICE Left 3/10/2017    Performed by oJse Antonio Franklin MD at [facility] OR    REMOVAL-HARDWARE-LEG Left 12/27/2016    Performed by [facility] at [facility] OR    REMOVAL-SUTURE Left 3/10/2017    Performed by [facility] at [facility] OR       Review of patient's allergies indicates:   Allergen Reactions    Celebrex [celecoxib] Other (See Comments)     Pain down her spine       No current facility-administered medications on file prior to encounter.      Current Outpatient Medications on File Prior to Encounter   Medication Sig    amlodipine (NORVASC) 10 MG tablet Take 10 mg by mouth every evening.    apixaban (ELIQUIS) 5 mg Tab Take 5 mg by mouth once daily.    atorvastatin (LIPITOR) 80 MG tablet Take 80 mg by mouth once daily.    hydrALAZINE (APRESOLINE) 50 MG tablet Take 50 mg by mouth 2 (two) times daily.    isosorbide mononitrate (IMDUR) 60 MG 24 hr tablet Take 60 mg by mouth once daily.    lisinopril (PRINIVIL,ZESTRIL) 5 MG tablet Take 5 mg by mouth once daily.    hydrocodone-acetaminophen 5-325mg (NORCO) 5-325 mg per tablet Take 2 tablets by mouth every 4 (four) hours as needed.     Family History     None        Tobacco Use    Smoking status: Never Smoker   Substance and Sexual Activity    Alcohol use: No    Drug use: No    Sexual activity: Not Currently     Review of Systems   Constitution: Negative for chills, decreased appetite, diaphoresis, fever and weakness.   Cardiovascular: Positive for leg swelling. Negative for chest pain, claudication, cyanosis, dyspnea on exertion, irregular heartbeat, near-syncope, orthopnea, palpitations, paroxysmal nocturnal dyspnea and  syncope.   Respiratory: Negative for cough, hemoptysis, shortness of breath and wheezing.    Gastrointestinal: Negative for bloating, abdominal pain, constipation, diarrhea, melena, nausea and vomiting.   Neurological: Negative for dizziness.     Objective:     Vital Signs (Most Recent):  Temp: 98.7 °F (37.1 °C) (11/13/18 0833)  Pulse: 60 (11/13/18 0833)  Resp: 18 (11/13/18 0833)  BP: 120/72 (11/13/18 0833)  SpO2: 97 % (11/13/18 0809) Vital Signs (24h Range):  Temp:  [97.8 °F (36.6 °C)-98.9 °F (37.2 °C)] 98.7 °F (37.1 °C)  Pulse:  [53-69] 60  Resp:  [14-18] 18  SpO2:  [93 %-99 %] 97 %  BP: (120-189)/(72-92) 120/72     Weight: 76.9 kg (169 lb 8.2 oz)  Body mass index is 29.1 kg/m².    SpO2: 97 %  O2 Device (Oxygen Therapy): room air      Intake/Output Summary (Last 24 hours) at 11/13/2018 1055  Last data filed at 11/12/2018 2245  Gross per 24 hour   Intake --   Output 1300 ml   Net -1300 ml       Lines/Drains/Airways     Peripherally Inserted Central Catheter Line                 PICC Double Lumen 12/28/16 1630 right basilic 684 days          Drain                 Urethral Catheter 11/12/18 1330 Non-latex 14 Fr. less than 1 day          Airway                 Airway - Non-Surgical 03/10/17 0712 Mask 613 days          Peripheral Intravenous Line                 Peripheral IV - Single Lumen 03/10/17 0655 Left Wrist 613 days                Physical Exam   Constitutional: She is oriented to person, place, and time. She appears well-developed and well-nourished. No distress.   Neck: JVD (@10cm) present.   Cardiovascular: Normal rate and regular rhythm. Exam reveals no gallop.   No murmur heard.  Pulmonary/Chest: Effort normal and breath sounds normal. No respiratory distress. She has no wheezes.   Abdominal: Soft. Bowel sounds are normal. She exhibits no distension. There is no tenderness.   Musculoskeletal: She exhibits edema (2+ BLE edema ).   Neurological: She is alert and oriented to person, place, and time.   Skin:  Skin is warm and dry.       Significant Labs:     Recent Labs   Lab 11/13/18  0440      K 5.5*   *   CO2 17*   BUN 27*   CREATININE 1.8*   MG 1.4*     Recent Labs   Lab 11/13/18  0440   WBC 5.81   RBC 3.76*   HGB 11.1*   HCT 36.6*      MCV 97   MCH 29.5   MCHC 30.3*       Significant Imaging: Echocardiogram:   2D echo with color flow doppler:   Results for orders placed or performed during the hospital encounter of 09/19/16   2D echo with color flow doppler   Result Value Ref Range    QEF 55 55 - 65    Mitral Valve Regurgitation MILD     Diastolic Dysfunction No     Aortic Valve Regurgitation TRIVIAL     Est. PA Systolic Pressure 37.57     Tricuspid Valve Regurgitation TRIVIAL      Repeat echocardiogram 11/12/2018    · The left ventricle cavity is moderately dilated.  · Left ventricle shows mild concentric hypertrophy.  · Left ventricle ejection fraction is moderately decreased at 35-40%  · Grade I (mild) left ventricular diastolic dysfunction consistent with impaired relaxation.  · LA pressure is normal.  · Local segmental wall motion abnormalities. The mid inferior wall and mid LVPW are akinetic  · Left atrium is moderately dilated.  · RV systolic function is normal.  · Right atrium is mildly dilated.  · Mild aortic regurgitation.  · Mild mitral regurgitation.  · Mild tricuspid regurgitation.  · Pulmonic valve shows mild regurgitation.  · Elevated central venous pressure (15 mm Hg).  · The estimated PA systolic pressure is 59.09 mm Hg  · Pulmonary hypertension present.  · No pericardial effusion.    Assessment and Plan:     Acute on chronic combined systolic and diastolic congestive heart failure, NYHA class 4    -recent echo at [facility] with EF down to 30-35% with repeat echo yesterday with EF 35-40% with WMA  -admitted last month with ADHF at [facility] with improvement with diuresis; etiology felt to be related to medication omission  -no recurrent chest pain or SOB since discharge; denies orthopnea  and PND  -noted JVD and peripheral edema noted on PE with IVF infusing; will stop IVF and give low dose of IV Lasix this AM  -BNP pending; troponin .023-.014  -continue CCB, BB and ACEI for now; will likely add Hydralazine and Imdur back to regimen as well  -concerning for ischemic etiology but currently asymptomatic and in need for orthopedic surgery; feel she is overall stable from a cardiac standpoint to proceed with surgery      Chronic kidney disease (CKD) stage G3b/A1, moderately decreased glomerular filtration rate (GFR) between 30-44 mL/min/1.73 square meter and albuminuria creatinine ratio less than 30 mg/g    -creatinine 2.0 upon admission; down to 1.8 this AM  -baseline creatinine 1.6-1.9       Essential hypertension    -BP elevated at 180s/90s upon admisison-?pain related  -on CCB, BB and ACEI at home doses with improved BPs  -also on Hydralazine and Imdur at home-will hold for now and consider re intiation in next 24-48 hours if BP tolerates          VTE Risk Mitigation (From admission, onward)        Ordered     enoxaparin injection 30 mg  Daily      11/13/18 0649     IP VTE HIGH RISK PATIENT  Once      11/12/18 1723     Place sequential compression device  Until discontinued      11/12/18 1723        82yo female with above stated history who was admitted following a fall. She is in need of intermediate risk surgery with intermediate clinic predictors and revised cardiac risk index of 0.9% of major cardiac event. She may proceed with orthopedic surgery from a cardiac standpoint on her current medication regimen. We will plan to proceed with cardiac evaluation once she recovers from her orthopedic surgery   Thank you for your consult. I will follow-up with patient. Please contact us if you have any additional questions.    BETHANY Cyr, ANP  Cardiology   [facility]

## 2018-11-13 NOTE — PROGRESS NOTES
Progress Note      [facility]   Admit Date: 11/12/2018   LOS: 1 day     SUBJECTIVE:     Subjective: The patient said she feels good, her right hip still hurting but controled with the pain med here. Right forearm is swelling and now repositioning the IV. She said she fell but didn't LOC, no prodrome, she tried to get up from the chair. She denies any cp, ha, sob, abd or urinary complaints. Good UOP.    HPI:  Patient is a 83 y.o. female who presents to to the ED as a transfer from [facility] 2/2 to lack of orthopedic coverage.. Patient stood up from her chair and fell on to her right side when attempting to take a step. Patient denies any dizziness or LOC prior to fall or any head trauma, Patient was found to have a right femoral neck fracture. At presentation patient denied any HA, fever, chills, chest pain, SOB. Patient now continues to endorses right hip pain, rated 7/10, does not radiate, localized to site of injury with no loss of sensation.         Scheduled Meds:   amLODIPine  10 mg Oral QHS    ascorbic acid (vitamin C)  500 mg Oral Daily    atorvastatin  80 mg Oral Daily    benazepril  20 mg Oral Daily    enoxaparin  30 mg Subcutaneous Daily    ferrous gluconate  324 mg Oral Daily with breakfast    lubiprostone  24 mcg Oral BID WM    magnesium oxide  400 mg Oral BID    magnesium oxide  400 mg Oral Q4H While awake    metoprolol tartrate  50 mg Oral BID    mirtazapine  15 mg Oral QHS    pantoprazole  40 mg Oral BID    senna-docusate 8.6-50 mg  1 tablet Oral Daily    vitamin D  2,000 Units Oral Daily     Continuous Infusions:  PRN Meds:albuterol, albuterol sulfate, bisacodyl, dextrose 50%, dextrose 50%, glucagon (human recombinant), glucose, glucose, HYDROcodone-acetaminophen, morphine, ondansetron, oxyCODONE-acetaminophen, sodium chloride 0.9%    Review of patient's allergies indicates:   Allergen Reactions    Celebrex [celecoxib] Other (See Comments)     Pain down  her spine       Review of Systems  See subjective    OBJECTIVE:     Vital Signs (Most Recent)  Temp: 98.7 °F (37.1 °C) (11/13/18 0833)  Pulse: 60 (11/13/18 0833)  Resp: 18 (11/13/18 0833)  BP: 120/72 (11/13/18 0833)  SpO2: 97 % (11/13/18 0809)    Vital Signs Range (Last 24H):  Temp:  [97.8 °F (36.6 °C)-98.9 °F (37.2 °C)]   Pulse:  [53-69]   Resp:  [14-18]   BP: (120-189)/(72-95)   SpO2:  [93 %-99 %]     I & O (Last 24H):    Intake/Output Summary (Last 24 hours) at 11/13/2018 0946  Last data filed at 11/12/2018 2245  Gross per 24 hour   Intake --   Output 1300 ml   Net -1300 ml     Wt Readings from Last 3 Encounters:   11/12/18 76.9 kg (169 lb 8.2 oz)   11/12/18 76.7 kg (169 lb)   11/02/18 72.5 kg (159 lb 14.4 oz)     Physical Exam:  Physical Exam   Constitutional: She is oriented to person, place, and time. She appears well-developed and well-nourished.   HENT:   Head: Normocephalic and atraumatic.   Eyes: Conjunctivae and EOM are normal. Pupils are equal, round, and reactive to light.   Neck: Normal range of motion. Neck supple.   Cardiovascular: Normal rate. Exam reveals s4.  Pulmonary/Chest: Effort normal and breath sounds normal. Slight wheezing at the bases rosalinda.  Abdominal: Soft. Bowel sounds are normal.   Musculoskeletal: She exhibits tenderness and deformity. She exhibits no edema.   Right leg externally rotated. Tender to palpation.     R forearm slight bleeding from IV site.  Neurological: She is alert and oriented to person, place, and time.   Skin: Skin is warm and dry. Capillary refill takes less than 2 seconds.   No erythema overlying the right hip fracture injury. Patient with venous stasis changes at the left lower extremity.    Psychiatric: She has a normal mood and affect.         Laboratory:  LABS  CBC  Recent Labs   Lab 11/12/18  1342 11/13/18  0440   WBC 8.73 5.81   RBC 3.70* 3.76*   HGB 10.8* 11.1*   HCT 35.4* 36.6*    161   MCV 96 97   MCH 29.2 29.5   MCHC 30.5* 30.3*     BMP  Recent  Labs   Lab 11/12/18  1342 11/13/18  0440    139   K 5.0 5.5*   CO2 17* 17*   * 115*   BUN 26* 27*   CREATININE 2.0* 1.8*   GLU 81 84       POCT-Glucose  POCT Glucose   Date Value Ref Range Status   11/13/2018 83 70 - 110 mg/dL Final   11/12/2018 95 70 - 110 mg/dL Final   11/12/2018 84 70 - 110 mg/dL Final       Recent Labs   Lab 11/12/18  1342 11/13/18  0440   CALCIUM 10.0 9.8   MG  --  1.4*   PHOS  --  2.7     LFT  Recent Labs   Lab 11/12/18  1342 11/13/18  0440   PROT 7.1 6.5   ALBUMIN 3.4* 3.1*   BILITOT 0.6 0.6   AST 23 19   ALKPHOS 88 78   ALT 12 11         COAGS  Recent Labs   Lab 11/12/18  1342   INR 1.1   APTT 32.2*     CE  No results for input(s): TROPONINI, CKTOTAL, CKMB in the last 168 hours.  ABGs  No results for input(s): PH, PCO2, PO2, HCO3, POCSATURATED, BE in the last 24 hours.  BNP  No results for input(s): BNP in the last 168 hours.  UA  Recent Labs   Lab 11/12/18  1347   COLORU Yellow   SPECGRAV 1.010   PHUR 7.0   PROTEINUA Trace*     LAST HbA1c  Lab Results   Component Value Date    HGBA1C 4.8 11/12/2018         Diagnostic Results:  Recent Labs     11/12/18  1342 11/13/18  0440   WBC 8.73 5.81   HGB 10.8* 11.1*   HCT 35.4* 36.6*    161   MCV 96 97   RDW 14.9* 14.8*       Recent Labs     11/12/18  1342 11/13/18  0440    139   K 5.0 5.5*   * 115*   CO2 17* 17*   GLU 81 84   BUN 26* 27*   CREATININE 2.0* 1.8*   CALCIUM 10.0 9.8   PROT 7.1 6.5   ALBUMIN 3.4* 3.1*   BILITOT 0.6 0.6   ALKPHOS 88 78   AST 23 19   ALT 12 11   ANIONGAP 9 7*   ESTGFRAFRICA 26* 30*   EGFRNONAA 23* 26*       Recent Labs     11/13/18  0440   MG 1.4*   PHOS 2.7       Coags  Lab Results   Component Value Date    INR 1.1 11/12/2018    INR 1.1 01/06/2017    INR 1.0 12/07/2016    APTT 32.2 (H) 11/12/2018    APTT 33.8 (H) 01/06/2017    APTT 25.0 12/07/2016     Recent Labs   Lab 11/12/18  1342   INR 1.1   APTT 32.2*       A1c:   Lab Results   Component Value Date    HGBA1C 4.8 11/12/2018   , Last Gluc:    Recent Labs   Lab 11/12/18  1748 11/12/18  1950 11/13/18  0615   POCTGLUCOSE 84 95 83       TSH:   Lab Results   Component Value Date    TSH 0.983 11/12/2018         Cardiac Enzymes  No results for input(s): TROPONINI, CPKMB, CPK in the last 72 hours.      Urinalysis  Urinalysis  Recent Labs   Lab 11/12/18  1347   COLORU Yellow   SPECGRAV 1.010   PHUR 7.0   PROTEINUA Trace*   NITRITE Negative   LEUKOCYTESUR Negative   UROBILINOGEN Negative     Recent Labs   Lab 11/12/18  1347   COLORU Yellow   SPECGRAV 1.010   PHUR 7.0   PROTEINUA Trace*       Micro  Microbiology Results (last 7 days)     ** No results found for the last 168 hours. **             ABG  No results for input(s): PH, PCO2, PO2, HCO3, POCSATURATED, BE in the last 168 hours.      Imaging   Imaging Results    None             IP CONSULT TO NEPHROLOGY-[facility]  IP CONSULT TO ORTHOPEDIC SURGERY  IP CONSULT TO CARDIOLOGY-[facility]    ASSESSMENT/PLAN:   Right femoral neck fracture   -Diagnosed by xray   -Right Leg externally rotated on exam   -No notable neurologic deficits   -Popliteal and dorsalis pedis pulses intact  -Orthopedics consulted  -Continue Pain control as tolerated      Heart Failure with preserved EF   -Previous TTE shows diastolic dysfunction. EF 55-60%. (9/2016)   -Will repeat TTE today.   -EKG today   -Patient was supposed to have a stress test today prior to hip fracture.      HTN  -Home regimen: Hydralizine 50mg PO BID, Isosorbide Mono-nitrate 60mg PO qHs, Lisinopril 5mg PO daily, Carvedilol 6.25mg PO BID, Amlodipine 10mg PO daily. Eliquis 10mg PO BID and Atorvastatin 80mg PO BID  -Will adjust accordingly following assement of vitals and fluid status       CKD Grade 4  GFR 26   Bun 26/Creatinine 2.0. Down to 1.8.  Will continue to monitor  Will consult nephrology - Preparation for future dialysis??      GERD  -Continue PPI      Arthritis  -Tylenol PRN, patient non-ambulatory currently.       PPx: SCDs /PENELOPE hose  Code: Full     Dc'd IVF per  combined HF. Ekg sinus serjio, pending cxr, INR, PT/PTT, trops, bnp, H/H stable, 2DE combined HF 35~40 with DD.  Patient and family reported no bleeding hx or intubated after the surgery she had years ago for her left knee replacement, but she did get staph infection and they had to fix it.    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 Curiel. Dc'd IVF per HF, pain controled with percocet 10 q6h scheduled, and morphin IV 2mg q4h prn.       ---Patient will need cardiac clearance before ortho surgery. -----     Jose Mayes MD

## 2018-11-13 NOTE — SUBJECTIVE & OBJECTIVE
Past Medical History:   Diagnosis Date    Arthritis     Elevated cholesterol     GERD (gastroesophageal reflux disease)     History of DVT (deep vein thrombosis) 2016    RUE    Hypertension        Past Surgical History:   Procedure Laterality Date    APPLICATION-CAST LONG LEG Left 3/10/2017    Performed [facility]    APPLICATION-EXTERNAL FIXATION DEVICE; knee spanning Left 12/27/2016    Performed by Jose Dent [facility] at [facility]    EXPLORATORY-LAPAROTOMY, possible bowel resection N/A 9/19/2016    Performed by Katie Solares [facility] at [facility]    HERNIA REPAIR      HYSTERECTOMY      IRRIGATION AND DEBRIDEMENT LOWER EXTREMITY Left 1/7/2017    Performed by [facility] at [facility]    IRRIGATION AND DEBRIDEMENT LOWER EXTREMITY; placement of antibiotic beads Left 12/27/2016    Performed by [facility] at [facility]    IRRIGATION AND DEBRIDEMENT LOWER EXTREMITY; possible antibiotic bead placement; possible wound vac Left 12/26/2016    Per[facility] by [facility] at [facility]    LAPAROSCOPY-DIAGNOSTIC N/A 9/19/2016    Performed by Katie [facility] at [facility]    LYSIS-ADHESION N/A 9/19/2016    [facility]formed [facility] at [facility]    OPEN REDUCTION INTERNAL FIXATION-FEMUR Left 12/9/2016    Performed by [facility]    ORIF FEMUR FRACTURE Left 12/2016    PLACEMENT SPACER-ANTIBIOTIC Left 1/7/2017    Performed [facility]    PLACEMENT SPACER-ANTIBIOTIC Left 12/27/2016    Performed by [facility] at [facility]    REMOVAL OF EXTERNAL FIXATION DEVICE Left 3/10/2017    Performed by [facility] at [facility]    REMOVAL-HARDWARE-LEG Left 12/27/2016    Performed by [facility] at [facility]    REMOVAL-SUTURE Left 3/10/2017    Performed by [facility] at [facility]       Review of patient's allergies indicates:   Allergen Reactions    Celebrex [celecoxib] Other (See Comments)     Pain down her  spine       No current facility-administered medications on file prior to encounter.      Current Outpatient Medications on File Prior to Encounter   Medication Sig    amlodipine (NORVASC) 10 MG tablet Take 10 mg by mouth every evening.    apixaban (ELIQUIS) 5 mg Tab Take 5 mg by mouth once daily.    atorvastatin (LIPITOR) 80 MG tablet Take 80 mg by mouth once daily.    hydrALAZINE (APRESOLINE) 50 MG tablet Take 50 mg by mouth 2 (two) times daily.    isosorbide mononitrate (IMDUR) 60 MG 24 hr tablet Take 60 mg by mouth once daily.    lisinopril (PRINIVIL,ZESTRIL) 5 MG tablet Take 5 mg by mouth once daily.    hydrocodone-acetaminophen 5-325mg (NORCO) 5-325 mg per tablet Take 2 tablets by mouth every 4 (four) hours as needed.     Family History     None        Tobacco Use    Smoking status: Never Smoker   Substance and Sexual Activity    Alcohol use: No    Drug use: No    Sexual activity: Not Currently     Review of Systems   Constitution: Negative for chills, decreased appetite, diaphoresis, fever and weakness.   Cardiovascular: Positive for leg swelling. Negative for chest pain, claudication, cyanosis, dyspnea on exertion, irregular heartbeat, near-syncope, orthopnea, palpitations, paroxysmal nocturnal dyspnea and syncope.   Respiratory: Negative for cough, hemoptysis, shortness of breath and wheezing.    Gastrointestinal: Negative for bloating, abdominal pain, constipation, diarrhea, melena, nausea and vomiting.   Neurological: Negative for dizziness.     Objective:     Vital Signs (Most Recent):  Temp: 98.7 °F (37.1 °C) (11/13/18 0833)  Pulse: 60 (11/13/18 0833)  Resp: 18 (11/13/18 0833)  BP: 120/72 (11/13/18 0833)  SpO2: 97 % (11/13/18 0809) Vital Signs (24h Range):  Temp:  [97.8 °F (36.6 °C)-98.9 °F (37.2 °C)] 98.7 °F (37.1 °C)  Pulse:  [53-69] 60  Resp:  [14-18] 18  SpO2:  [93 %-99 %] 97 %  BP: (120-189)/(72-92) 120/72     Weight: 76.9 kg (169 lb 8.2 oz)  Body mass index is 29.1 kg/m².    SpO2: 97  %  O2 Device (Oxygen Therapy): room air      Intake/Output Summary (Last 24 hours) at 11/13/2018 1055  Last data filed at 11/12/2018 2245  Gross per 24 hour   Intake --   Output 1300 ml   Net -1300 ml       Lines/Drains/Airways     Peripherally Inserted Central Catheter Line                 PICC Double Lumen 12/28/16 1630 right basilic 684 days          Drain                 Urethral Catheter 11/12/18 1330 Non-latex 14 Fr. less than 1 day          Airway                 Airway - Non-Surgical 03/10/17 0712 Mask 613 days          Peripheral Intravenous Line                 Peripheral IV - Single Lumen 03/10/17 0655 Left Wrist 613 days                Physical Exam   Constitutional: She is oriented to person, place, and time. She appears well-developed and well-nourished. No distress.   Neck: JVD (@10cm) present.   Cardiovascular: Normal rate and regular rhythm. Exam reveals no gallop.   No murmur heard.  Pulmonary/Chest: Effort normal and breath sounds normal. No respiratory distress. She has no wheezes.   Abdominal: Soft. Bowel sounds are normal. She exhibits no distension. There is no tenderness.   Musculoskeletal: She exhibits edema (2+ BLE edema ).   Neurological: She is alert and oriented to person, place, and time.   Skin: Skin is warm and dry.       Significant Labs:     Recent Labs   Lab 11/13/18  0440      K 5.5*   *   CO2 17*   BUN 27*   CREATININE 1.8*   MG 1.4*     Recent Labs   Lab 11/13/18  0440   WBC 5.81   RBC 3.76*   HGB 11.1*   HCT 36.6*      MCV 97   MCH 29.5   MCHC 30.3*       Significant Imaging: Echocardiogram:   2D echo with color flow doppler:   Results for orders placed or performed during the hospital encounter of 09/19/16   2D echo with color flow doppler   Result Value Ref Range    QEF 55 55 - 65    Mitral Valve Regurgitation MILD     Diastolic Dysfunction No     Aortic Valve Regurgitation TRIVIAL     Est. PA Systolic Pressure 37.57     Tricuspid Valve Regurgitation  TRIVIAL      Repeat echocardiogram 11/12/2018    · The left ventricle cavity is moderately dilated.  · Left ventricle shows mild concentric hypertrophy.  · Left ventricle ejection fraction is moderately decreased at 35-40%  · Grade I (mild) left ventricular diastolic dysfunction consistent with impaired relaxation.  · LA pressure is normal.  · Local segmental wall motion abnormalities. The mid inferior wall and mid LVPW are akinetic  · Left atrium is moderately dilated.  · RV systolic function is normal.  · Right atrium is mildly dilated.  · Mild aortic regurgitation.  · Mild mitral regurgitation.  · Mild tricuspid regurgitation.  · Pulmonic valve shows mild regurgitation.  · Elevated central venous pressure (15 mm Hg).  · The estimated PA systolic pressure is 59.09 mm Hg  · Pulmonary hypertension present.  · No pericardial effusion.

## 2018-11-13 NOTE — ANESTHESIA PREPROCEDURE EVALUATION
11/13/2018     Adilia Rosas is a 83 y.o., female with Right hip fracture. Has history of CHF (most recent echo now with LVEF 35-40%). Also has URI.     Review of patient's allergies indicates:   Allergen Reactions    Celebrex [celecoxib] Other (See Comments)     Pain down her spine     Past Medical History:   Diagnosis Date    Arthritis     Elevated cholesterol     GERD (gastroesophageal reflux disease)     History of DVT (deep vein thrombosis) 2016    RUE    Hypertension          Past Surgical History:   Procedure Laterality Date    APPLICATION-CAST LONG LEG Left 3/10/2017    Performed by [facility] at [facility]    APPLICATION-EXTERNAL FIXATION DEVICE; knee spanning Left 12/27/2016    Performed by [facility] at [facility]    EXPLORATORY-LAPAROTOMY, possible bowel resection N/A 9/19/2016    Performed by [facility]ares [facility] at [facility]    HERNIA REPAIR      HYSTERECTOMY      IRRIGATION AND DEBRIDEMENT LOWER EXTREMITY Left 1/7/2017    Performed by [facility] at [facility]    IRRIGATION AND DEBRIDEMENT LOWER EXTREMITY; placement of antibiotic beads Left 12/27/2016    [facility]formed by [facility] at [facility]    IRRIGATION AND DEBRIDEMENT LOWER EXTREMITY; possible antibiotic bead placement; possible wound vac Left 12/26/2016    Performed by [facility] at [facility]    LAPAROSCOPY-DIAGNOSTIC N/A 9/19/2016    [facility]formed by [facility] at [facility]    LYSIS-ADHESION N/A 9/19/2016    Performed by [facility] at [facility]    OPEN REDUCTION INTERNAL FIXATION-FEMUR Left 12/9/2016    [facility]formed by [facility]    ORIF FEMUR FRACTURE Left 12/2016    PLACEMENT SPACER-ANTIBIOTIC Left 1/7/2017    Performed [facility]    PLACEMENT SPACER-ANTIBIOTIC Left 12/27/2016    Performed by [facility] at [facility]     REMOVAL OF EXTERNAL FIXATION DEVICE Left 3/10/2017    Performed by [facility] at [facility]    REMOVAL-HARDWARE-LEG Left 12/27/2016    Performed by Jose Dent [facility] at [facility]    REMOVAL-SUTURE Left 3/10/2017    Performed by [facility] at [facility]          Pre-op Assessment    I have reviewed the Patient Summary Reports.     I have reviewed the Nursing Notes.   I have reviewed the Medications.     Review of Systems  Anesthesia Hx:  No problems with previous Anesthesia Denies Hx of Anesthetic complications  History of prior surgery of interest to airway management or planning: Previous anesthesia: General Denies Family Hx of Anesthesia complications.   Denies Personal Hx of Anesthesia complications.   Social:  Non-Smoker, No Alcohol Use    Hematology/Oncology:     Oncology Normal   Hematology Comments: Last dose of eliquis was Messi night        EENT/Dental:EENT/Dental Normal   Cardiovascular:   Exercise tolerance: good Hypertension Past MI: MI 2006, medical management. CAD   CHF hyperlipidemia ECG has been reviewed. History myocardia ischemia Deep Venous Thrombosis (DVT) (on eliquis, but it is currenlty on hold for procedure since 3/03/2017), Hx of DVT, recent DVT (<6 months), right upper extremity    Pulmonary:   Asthma mild and asymptomatic Denies Shortness of breath. Recent URI (started 2 weeks ago), unresolved    Renal/:   Chronic Renal Disease, CRI    Hepatic/GI:   GERD, well controlled    Musculoskeletal:   Arthritis  Hip fracture   Neurological:  Neurology Normal    Endocrine:  Endocrine Normal          BP Readings from Last 3 Encounters:   11/13/18 135/72   11/12/18 (!) 167/95   11/02/18 (!) 161/73         Physical Exam  General:  Malnutrition    Airway/Jaw/Neck:  Airway Findings: Mouth Opening: Normal Tongue: Normal  General Airway Assessment: Adult  Mallampati: II  Improves to II with phonation.  TM Distance: 4 - 6 cm        Eyes/Ears/Nose:  EYES/EARS/NOSE FINDINGS: Normal    Dental:  Dental Findings: Periodontal disease, Severe    Chest/Lungs:  Chest/Lungs Clear    Heart/Vascular:  Heart Findings: Normal     Musculoskeletal:  Musculoskeletal Findings: (external fixation device LLE, neurovasc intact with good pedal pulse)    Skin:  Skin Findings: (dry)  Decreased Turgor     Mental Status:  Mental Status Findings: Normal      EKG 2016-12-07  Sinus bradycardia with sinus arrhythmia  Possible Inferior infarct (cited on or before 11-MAY-2006)  Abnormal ECG  When compared with ECG of 19-SEP-2016 06:17,  Nonspecific T wave abnormality, improved in Anterior leads  Confirmed by Gus ALBA, [facility] ([id number]) on 12/8/2016 6:53:18 PM    ECHO 11/12/18: See report for further details  · The left ventricle cavity is moderately dilated.  · Left ventricle shows mild concentric hypertrophy.  · Left ventricle ejection fraction is moderately decreased at 35-40%  · Grade I (mild) left ventricular diastolic dysfunction consistent with impaired relaxation.  · LA pressure is normal.  · Local segmental wall motion abnormalities. The mid inferior wall and mid LVPW are akinetic  · Left atrium is moderately dilated.  · RV systolic function is normal.  · Right atrium is mildly dilated.  · Mild aortic regurgitation.  · Mild mitral regurgitation.  · Mild tricuspid regurgitation.  · Pulmonic valve shows mild regurgitation.  · Elevated central venous pressure (15 mm Hg).  · The estimated PA systolic pressure is 59.09 mm Hg  · Pulmonary hypertension present.  · No pericardial effusion.    Lab Results   Component Value Date    WBC 5.81 11/13/2018    HGB 11.1 (L) 11/13/2018    HCT 36.6 (L) 11/13/2018     11/13/2018    CHOL 129 11/29/2006    TRIG 93 11/29/2006    HDL 38.0 (L) 11/29/2006    ALT 12 11/12/2018    AST 19 11/13/2018     11/13/2018    K 5.5 (H) 11/13/2018     (H) 11/13/2018    CREATININE 2.0 (H) 11/12/2018    BUN 26 (H) 11/12/2018    CO2 17 (L) 11/13/2018    TSH 0.983 11/12/2018    INR 1.1  11/12/2018    HGBA1C 4.8 11/12/2018         Anesthesia Plan  Type of Anesthesia, risks & benefits discussed:  Anesthesia Type:  general  Patient's Preference:   Intra-op Monitoring Plan:   Intra-op Monitoring Plan Comments:   Post Op Pain Control Plan:   Post Op Pain Control Plan Comments:   Induction:   IV  Beta Blocker:  Patient is on a Beta-Blocker and has received one dose within the past 24 hours (No further documentation required).       Informed Consent: Patient understands risks and agrees with Anesthesia plan.  Questions answered. Anesthesia consent signed with patient.  ASA Score: 4     Day of Surgery Review of History & Physical:        Anesthesia Plan Notes: Patient had Eliquis Sunday Night--> No neuraxial anesthesia.  Currently has URI (started 2 weeks ago).        Ready For Surgery From Anesthesia Perspective.

## 2018-11-13 NOTE — ASSESSMENT & PLAN NOTE
-BP elevated at 180s/90s upon admisison-?pain related  -on CCB, BB and ACEI at home doses with improved BPs  -also on Hydralazine and Imdur at home-will hold for now and consider re intiation in next 24-48 hours if BP tolerates

## 2018-11-13 NOTE — HOSPITAL COURSE
11/12/2018 Presented to the ER at [facility] following fall. Xray with evidence of femur fracture and transferred to [facility] for orthopedic evaluation. Admitted to [facility] and placed on telemetry on 5th floor. Echocardiogram with moderately depressed LV function with EF 35-40%, grade I diastolic dysfunction, mild AI/MR/TR and WMA (akines to mid inferior wall and mid posterior wall). Previous echo at [facility] 10/15/2018 with LVEF 30-35% and no mention of WMA. BP elevated at 160s-180s/90s upon admission ?pain related  11/13/2018 K+ 5.5 creatinine 1.8  (baseline 1.6-1.9). BP down to 130s/70s HR 50s-60s with no arrhythmias noted on telemetry overnight. Troponin .023 and BNP pending. Cardiology consulted for preoperative clearance   11/14/2018 Underwent right hip surgery yesterday. Currently reports pain controlled with pain meds. No complaints of chest pain or SOB overnight. HR and BP stable. No arrhythmias noted on telemetry overnight. V Lasix yesterday with 3.5 liters out and negative 3.6 liters since admission-JVD improved. K+ 5.2 with BUN 26 creatinine 2.0 H&H stable at baseline   11/15/2018 PT on board and OOB with ambulation this AM. HR and BP stable this AM. Noted SBP down to 90s yesterday evening with NS bolus given. Creatinine 2.3. H&H down to 8.4 & 27.4 this AM from 10.2 & 33.1 yesterday. Monitored on telemetry with short 3 beat run of NSVT noted. Patient without complaints. Continue BB therapy and continue to monitor on telemetry   11/16/2018 H&H 8.8&29.0 today up slightly from 8.4&27.4 yesterday. K+ 5.3 this AM with creatinine 2.5 up from 2.3 yesterday and 2.0 the day prior. Recurrent episodes of NSVT (3-6beats) on telemetry. BP 90s-110s overnight-CCB, BB and ACEI discontinued per primary team

## 2018-11-13 NOTE — ASSESSMENT & PLAN NOTE
-recent echo at [facility] with EF down to 30-35% with repeat echo yesterday with EF 35-40% with WMA  -admitted last month with ADHF at [facility] with improvement with diuresis; etiology felt to be related to medication omission  -no recurrent chest pain or SOB since discharge; denies orthopnea and PND  -noted JVD and peripheral edema noted on PE with IVF infusing; will stop IVF and give low dose of IV Lasix this AM  -BNP pending; troponin .023-.014  -continue CCB, BB and ACEI for now; will likely add Hydralazine and Imdur back to regimen as well  -concerning for ischemic etiology but currently asymptomatic and in need for orthopedic surgery; feel she is overall stable from a cardiac standpoint to proceed with surgery

## 2018-11-13 NOTE — CONSULTS
[facility] Consult  Note  CC:   Patient is a 83 y.o. female who presents to to the ED as a transfer from [facility] 2/2 to lack of orthopedic coverage.. Patient stood up from her chair and fell on to her right side when attempting to take a step. Patient denies any dizziness or LOC prior to fall or any head trauma, Patient was found to have a right femoral neck fracture.        Subjective:   No complains   Past Medical History:   Diagnosis Date    Arthritis     Elevated cholesterol     GERD (gastroesophageal reflux disease)     History of DVT (deep vein thrombosis) 2016    RUE    Hypertension       ?  Adilia  has a past medical history of Arthritis, Elevated cholesterol, GERD (gastroesophageal reflux disease), History of DVT (deep vein thrombosis) (2016), and Hypertension.  Adilia  has a past surgical history that includes Hysterectomy; Hernia repair; ORIF femur fracture (Left, 12/2016); REMOVAL OF EXTERNAL FIXATION DEVICE (Left, 3/10/2017); APPLICATION-CAST LONG LEG (Left, 3/10/2017); REMOVAL-SUTURE (Left, 3/10/2017); IRRIGATION AND DEBRIDEMENT LOWER EXTREMITY (Left, 1/7/2017); PLACEMENT SPACER-ANTIBIOTIC (Left, 1/7/2017); IRRIGATION AND DEBRIDEMENT LOWER EXTREMITY; placement of antibiotic beads (Left, 12/27/2016); REMOVAL-HARDWARE-LEG (Left, 12/27/2016); APPLICATION-EXTERNAL FIXATION DEVICE; knee spanning (Left, 12/27/2016); PLACEMENT SPACER-ANTIBIOTIC (Left, 12/27/2016); IRRIGATION AND DEBRIDEMENT LOWER EXTREMITY; possible antibiotic bead placement; possible wound vac (Left, 12/26/2016); OPEN REDUCTION INTERNAL FIXATION-FEMUR (Left, 12/9/2016); LAPAROSCOPY-DIAGNOSTIC (N/A, 9/19/2016); EXPLORATORY-LAPAROTOMY, possible bowel resection (N/A, 9/19/2016); and LYSIS-ADHESION (N/A, 9/19/2016).  Her family history is not on file.  Adilia  reports that  has never smoked. She does not have any smokeless tobacco history on file. She reports that she does not drink alcohol or use drugs.  No  "current facility-administered medications on file prior to encounter.      Current Outpatient Medications on File Prior to Encounter   Medication Sig Dispense Refill    amlodipine (NORVASC) 10 MG tablet Take 10 mg by mouth every evening.      apixaban (ELIQUIS) 5 mg Tab Take 5 mg by mouth once daily.      atorvastatin (LIPITOR) 80 MG tablet Take 80 mg by mouth once daily.      hydrALAZINE (APRESOLINE) 50 MG tablet Take 50 mg by mouth 2 (two) times daily.      isosorbide mononitrate (IMDUR) 60 MG 24 hr tablet Take 60 mg by mouth once daily.      lisinopril (PRINIVIL,ZESTRIL) 5 MG tablet Take 5 mg by mouth once daily.      hydrocodone-acetaminophen 5-325mg (NORCO) 5-325 mg per tablet Take 2 tablets by mouth every 4 (four) hours as needed. 50 tablet 0     Aldonia is allergic to celebrex [celecoxib].    ?    Objective:   /72 (BP Location: Left arm, Patient Position: Lying)   Pulse (!) 59   Temp 98.7 °F (37.1 °C) (Oral)   Resp 18   Ht 5' 4" (1.626 m)   Wt 76.9 kg (169 lb 8.2 oz)   LMP  (LMP Unknown)   SpO2 96%   Breastfeeding? No   BMI 29.10 kg/m²   Gen - NAD, A+Ox4, not confused  Gait - normal  HEENT/ NECK - EOMI/, NCAT, no JVD   CVS - RRR, no m/r/s3/s4  Resp - CTAB, no w/r/r  Abd - soft, NT, ND   Ext - no c/c/e  Psych - normal affect and behavior  Neuro - no asterixis  Laboratory:  Recent Results (from the past 24 hour(s))   Transthoracic echo (TTE) complete (Cupid Only)    Collection Time: 11/12/18  4:22 PM   Result Value Ref Range    LA WIDTH 4.91 cm    PV PEAK VELOCITY 1.06 cm/s    LVIDD 5.49 3.5 - 6.0 cm    IVS 1.20 (A) 0.6 - 1.1 cm    PW 1.20 (A) 0.6 - 1.1 cm    Ao root annulus 3.04 cm    LVIDS 3.55 2.1 - 4.0 cm    FS 35 28 - 44 %    LA volume 113.86 cm3    LV mass 271.58 g    LA size 4.35 cm    RVDD 2.52 cm    Left Ventricle Relative Wall Thickness 0.44 cm    AV mean gradient 7.07 mmHg    AV valve area 2.00 cm2    E/A ratio 0.57     E wave decelartion time 277.30 msec    Pulm vein S/D ratio " 1.55     LVOT diameter 2.05 cm    LVOT area 3.30 cm2    LVOT peak VTI 25.07 cm    Ao peak joe 1.92 m/s    Ao VTI 41.40 cm    LVOT stroke volume 82.70 cm3    AV peak gradient 14.75 mmHg    MV Peak E Joe 0.64 m/s    TR Max Joe 3.32 m/s    MV Peak A Joe 1.12 m/s    PV Peak S Joe 0.45 m/s    PV Peak D Joe 0.29 m/s    LV Systolic Volume 52.70 mL    LV Diastolic Volume 147.04 mL    Right Atrium Major Axis 5.65 cm    Left Atrium Minor Axis 6.13 cm    Left Atrium Major Axis 6.42 cm    Triscuspid Valve Regurgitation Peak Gradient 44.09 mmHg    BSA 1.86 m2    LV Systolic Volume Index 28.3 mL/m2    LV Diastolic Volume Index 79.05 mL/m2    LA Volume Index 61.2 mL/m2    LV Mass Index 146.0 g/m2    Right Atrial Pressure (from IVC) 15 mmHg    TV rest pulmonary artery pressure 59.09 mmHg   POCT glucose    Collection Time: 11/12/18  5:48 PM   Result Value Ref Range    POCT Glucose 84 70 - 110 mg/dL   TSH    Collection Time: 11/12/18  6:30 PM   Result Value Ref Range    TSH 0.983 0.400 - 4.000 uIU/mL   Hemoglobin A1c    Collection Time: 11/12/18  6:30 PM   Result Value Ref Range    Hemoglobin A1C 4.8 4.0 - 5.6 %    Estimated Avg Glucose 91 68 - 131 mg/dL   POCT glucose    Collection Time: 11/12/18  7:50 PM   Result Value Ref Range    POCT Glucose 95 70 - 110 mg/dL   CBC auto differential    Collection Time: 11/13/18  4:40 AM   Result Value Ref Range    WBC 5.81 3.90 - 12.70 K/uL    RBC 3.76 (L) 4.00 - 5.40 M/uL    Hemoglobin 11.1 (L) 12.0 - 16.0 g/dL    Hematocrit 36.6 (L) 37.0 - 48.5 %    MCV 97 82 - 98 fL    MCH 29.5 27.0 - 31.0 pg    MCHC 30.3 (L) 32.0 - 36.0 g/dL    RDW 14.8 (H) 11.5 - 14.5 %    Platelets 161 150 - 350 K/uL    MPV 11.0 9.2 - 12.9 fL    Gran # (ANC) 4.2 1.8 - 7.7 K/uL    Lymph # 0.9 (L) 1.0 - 4.8 K/uL    Mono # 0.4 0.3 - 1.0 K/uL    Eos # 0.3 0.0 - 0.5 K/uL    Baso # 0.02 0.00 - 0.20 K/uL    Gran% 72.4 38.0 - 73.0 %    Lymph% 15.7 (L) 18.0 - 48.0 %    Mono% 6.2 4.0 - 15.0 %    Eosinophil% 5.2 0.0 - 8.0 %     Basophil% 0.3 0.0 - 1.9 %    Differential Method Automated    Comprehensive Metabolic Panel (CMP)    Collection Time: 11/13/18  4:40 AM   Result Value Ref Range    Sodium 139 136 - 145 mmol/L    Potassium 5.5 (H) 3.5 - 5.1 mmol/L    Chloride 115 (H) 95 - 110 mmol/L    CO2 17 (L) 23 - 29 mmol/L    Glucose 84 70 - 110 mg/dL    BUN, Bld 27 (H) 8 - 23 mg/dL    Creatinine 1.8 (H) 0.5 - 1.4 mg/dL    Calcium 9.8 8.7 - 10.5 mg/dL    Total Protein 6.5 6.0 - 8.4 g/dL    Albumin 3.1 (L) 3.5 - 5.2 g/dL    Total Bilirubin 0.6 0.1 - 1.0 mg/dL    Alkaline Phosphatase 78 55 - 135 U/L    AST 19 10 - 40 U/L    ALT 11 10 - 44 U/L    Anion Gap 7 (L) 8 - 16 mmol/L    eGFR if African American 30 (A) >60 mL/min/1.73 m^2    eGFR if non African American 26 (A) >60 mL/min/1.73 m^2   Magnesium    Collection Time: 11/13/18  4:40 AM   Result Value Ref Range    Magnesium 1.4 (L) 1.6 - 2.6 mg/dL   Phosphorus    Collection Time: 11/13/18  4:40 AM   Result Value Ref Range    Phosphorus 2.7 2.7 - 4.5 mg/dL   POCT glucose    Collection Time: 11/13/18  6:15 AM   Result Value Ref Range    POCT Glucose 83 70 - 110 mg/dL   Troponin I    Collection Time: 11/13/18 10:05 AM   Result Value Ref Range    Troponin I 0.023 0.000 - 0.026 ng/mL   APTT    Collection Time: 11/13/18 10:18 AM   Result Value Ref Range    aPTT 29.9 21.0 - 32.0 sec   Protime-INR    Collection Time: 11/13/18 10:18 AM   Result Value Ref Range    Prothrombin Time 11.4 9.0 - 12.5 sec    INR 1.1 0.8 - 1.2   Troponin I    Collection Time: 11/13/18 10:18 AM   Result Value Ref Range    Troponin I 0.014 0.000 - 0.026 ng/mL     Recent Labs   Lab 11/12/18  1342 11/13/18  0440   WBC 8.73 5.81   HGB 10.8* 11.1*   HCT 35.4* 36.6*    161   MCV 96 97     Recent Labs   Lab 11/12/18  1342 11/13/18  0440    139   K 5.0 5.5*   * 115*   CO2 17* 17*   BUN 26* 27*   GLU 81 84   CALCIUM 10.0 9.8   MG  --  1.4*   PHOS  --  2.7     Recent Labs   Lab 11/12/18  1342 11/13/18  0440   PROT 7.1 6.5    ALBUMIN 3.4* 3.1*   BILITOT 0.6 0.6   AST 23 19   ALT 12 11   ALKPHOS 88 78     Recent Labs   Lab 11/12/18  1342 11/13/18  1018   INR 1.1 1.1     Cardiac:   Recent Labs   Lab 11/13/18  1005 11/13/18  1018   TROPONINI 0.023 0.014     FLP:   Lab Results   Component Value Date    CHOL 129 11/29/2006    HDL 38.0 (L) 11/29/2006    LDLCALC 72.4 11/29/2006    TRIG 93 11/29/2006    CHOLHDL 29.5 11/29/2006     DM:   Lab Results   Component Value Date    HGBA1C 4.8 11/12/2018    LDLCALC 72.4 11/29/2006    CREATININE 1.8 (H) 11/13/2018     Thyroid:   Lab Results   Component Value Date    TSH 0.983 11/12/2018     Anemia: No results found for: IRON, TIBC, FERRITIN, [id number], FOLATE  Urinalysis:   Lab Results   Component Value Date    LABURIN No growth 01/13/2017    COLORU Yellow 11/12/2018    SPECGRAV 1.010 11/12/2018    NITRITE Negative 11/12/2018    KETONESU Negative 11/12/2018    UROBILINOGEN Negative 11/12/2018     Assessment:   83 to f with pmh of HFpEF, HTN, CKD 4. Patient stood up from her chair and fell on to her right side when attempting to take a step.    RIGHT HEP FRACTURE   CKD 3-4 GFR 36-30 AT BASELINE possible 2/2 htn   - ua trace protein / blood trace    HTN   HFpEF   Plan:   Renal function stable   Protein creatinine ration   Follow up at discharge with [facility] nephrology   Renally dose all meds     Ruiz Vasques  [facility] PGY5    ?  ?

## 2018-11-13 NOTE — PLAN OF CARE
TN met with pt's family  --  p    pt's physical address    [address]  [address]  [place], [place]     lives with daughter Joseph   pt has a rollator, bsc   no HH at this time.   Has had it in the past     discussed possiblity of pt going to SNF unit post d/c pending PT/OT eval post op   familily ok with 1.  [facility] -- will give TN 2 other choices tomorrow.    dx:  R fem neck fx     undergoing surgery at this time.    3/10/18  - surgery at [facility] leg ORIF - L femur        11/13/18 1711   Discharge Assessment   Assessment Type Discharge Planning Assessment   Confirmed/corrected address and phone number on facesheet? Yes   Assessment information obtained from? Patient   Expected Length of Stay (days) 3   Communicated expected length of stay with patient/caregiver yes   Prior to hospitilization cognitive status: Alert/Oriented   Prior to hospitalization functional status: Assistive Equipment   Current cognitive status: Alert/Oriented   Current Functional Status: Assistive Equipment   Lives With child(francy), adult   Able to Return to Prior Arrangements unable to determine at this time (comments)   Is patient able to care for self after discharge? Unable to determine at this time (comments)   Who are your caregiver(s) and their phone number(s)? (vel Ruiz, Suze  [phone]  )   Patient's perception of discharge disposition skilled nursing facility  (snf vs home with hh )   Readmission Within The Last 30 Days no previous admission in last 30 days   Patient currently being followed by outpatient case management? No   Patient currently receives any other outside agency services? No   Equipment Currently Used at Home rollator;bedside commode   Do you have any problems affording any of your prescribed medications? No   Transportation Available family or friend will provide   Does the patient receive services at the [facility]? No   Discharge Plan A Skilled Nursing Facility    Discharge Plan B Home;Home with family;Home Health   Patient/Family In Agreement With Plan yes

## 2018-11-13 NOTE — HPI
"84yo female with history of chronic combined systolic and diastolic heart failure (EF 55% previously and down to 30% in 10/2018 during admission at [facility]), HTN, CKD stage III and GERD who presented to the ER at [facility] following a fall. She reports being in her USOH yesterday and was preparing to go to her stress test appointment. She stood from a chair and had not started walking and reports she fell over. She denies any chest pain, palpitations, dizziness or SOB prior to the episode. She states "I was standing and all I remember was going down". She denies any syncope or LOC. She was admitted last month to [facility] with ADHF related to omission of medication regimen. She was diuresed and discharged home a few days later. She reports feeling much better upon discharge and denies any chest pain or SOB since discharge. She is fairly active at home by doing light housework and grocery shopping with no complaints of chest pain or SOB. She has stairs in her house but is unable to climb them due to her previous fracture in 2017. She denies any orthopnea or PND and reports compliance with her medication regimen at home as well as her salt and fluid restriction   "

## 2018-11-14 LAB
ALBUMIN SERPL BCP-MCNC: 2.9 G/DL
ALP SERPL-CCNC: 74 U/L
ALT SERPL W/O P-5'-P-CCNC: 10 U/L
ANION GAP SERPL CALC-SCNC: 9 MMOL/L
AST SERPL-CCNC: 27 U/L
BACTERIA #/AREA URNS HPF: NORMAL /HPF
BASOPHILS # BLD AUTO: 0.01 K/UL
BASOPHILS NFR BLD: 0.1 %
BILIRUB SERPL-MCNC: 0.4 MG/DL
BILIRUB UR QL STRIP: NEGATIVE
BUN SERPL-MCNC: 26 MG/DL
CALCIUM SERPL-MCNC: 9.3 MG/DL
CHLORIDE SERPL-SCNC: 112 MMOL/L
CLARITY UR: CLEAR
CO2 SERPL-SCNC: 17 MMOL/L
COLOR UR: YELLOW
CREAT SERPL-MCNC: 2 MG/DL
DIFFERENTIAL METHOD: ABNORMAL
EOSINOPHIL # BLD AUTO: 0.3 K/UL
EOSINOPHIL NFR BLD: 3.6 %
ERYTHROCYTE [DISTWIDTH] IN BLOOD BY AUTOMATED COUNT: 14.7 %
EST. GFR  (AFRICAN AMERICAN): 26 ML/MIN/1.73 M^2
EST. GFR  (NON AFRICAN AMERICAN): 23 ML/MIN/1.73 M^2
GLUCOSE SERPL-MCNC: 78 MG/DL
GLUCOSE UR QL STRIP: NEGATIVE
HCT VFR BLD AUTO: 33.1 %
HGB BLD-MCNC: 10.2 G/DL
HGB UR QL STRIP: ABNORMAL
HYALINE CASTS #/AREA URNS LPF: 1 /LPF
KETONES UR QL STRIP: NEGATIVE
LEUKOCYTE ESTERASE UR QL STRIP: ABNORMAL
LYMPHOCYTES # BLD AUTO: 0.7 K/UL
LYMPHOCYTES NFR BLD: 9.8 %
MAGNESIUM SERPL-MCNC: 1.4 MG/DL
MCH RBC QN AUTO: 29.3 PG
MCHC RBC AUTO-ENTMCNC: 30.8 G/DL
MCV RBC AUTO: 95 FL
MICROSCOPIC COMMENT: NORMAL
MONOCYTES # BLD AUTO: 0.4 K/UL
MONOCYTES NFR BLD: 5.2 %
NEUTROPHILS # BLD AUTO: 5.8 K/UL
NEUTROPHILS NFR BLD: 81 %
NITRITE UR QL STRIP: NEGATIVE
PH UR STRIP: 6 [PH] (ref 5–8)
PHOSPHATE SERPL-MCNC: 3.2 MG/DL
PLATELET # BLD AUTO: 122 K/UL
PMV BLD AUTO: 11.9 FL
POCT GLUCOSE: 103 MG/DL (ref 70–110)
POCT GLUCOSE: 116 MG/DL (ref 70–110)
POCT GLUCOSE: 85 MG/DL (ref 70–110)
POCT GLUCOSE: 87 MG/DL (ref 70–110)
POTASSIUM SERPL-SCNC: 5.2 MMOL/L
PROT SERPL-MCNC: 5.9 G/DL
PROT UR QL STRIP: ABNORMAL
RBC # BLD AUTO: 3.48 M/UL
RBC #/AREA URNS HPF: 2 /HPF (ref 0–4)
SODIUM SERPL-SCNC: 138 MMOL/L
SP GR UR STRIP: 1.02 (ref 1–1.03)
SQUAMOUS #/AREA URNS HPF: 0 /HPF
URN SPEC COLLECT METH UR: ABNORMAL
UROBILINOGEN UR STRIP-ACNC: NEGATIVE EU/DL
WBC # BLD AUTO: 7.14 K/UL
WBC #/AREA URNS HPF: 4 /HPF (ref 0–5)

## 2018-11-14 PROCEDURE — 63600175 PHARM REV CODE 636 W HCPCS: Performed by: STUDENT IN AN ORGANIZED HEALTH CARE EDUCATION/TRAINING PROGRAM

## 2018-11-14 PROCEDURE — 36415 COLL VENOUS BLD VENIPUNCTURE: CPT

## 2018-11-14 PROCEDURE — G8978 MOBILITY CURRENT STATUS: HCPCS | Mod: CK

## 2018-11-14 PROCEDURE — 85025 COMPLETE CBC W/AUTO DIFF WBC: CPT

## 2018-11-14 PROCEDURE — 25000003 PHARM REV CODE 250: Performed by: FAMILY MEDICINE

## 2018-11-14 PROCEDURE — 83735 ASSAY OF MAGNESIUM: CPT

## 2018-11-14 PROCEDURE — 94761 N-INVAS EAR/PLS OXIMETRY MLT: CPT

## 2018-11-14 PROCEDURE — 81000 URINALYSIS NONAUTO W/SCOPE: CPT

## 2018-11-14 PROCEDURE — 84100 ASSAY OF PHOSPHORUS: CPT

## 2018-11-14 PROCEDURE — 25000003 PHARM REV CODE 250: Performed by: STUDENT IN AN ORGANIZED HEALTH CARE EDUCATION/TRAINING PROGRAM

## 2018-11-14 PROCEDURE — G8979 MOBILITY GOAL STATUS: HCPCS | Mod: CI

## 2018-11-14 PROCEDURE — 99233 SBSQ HOSP IP/OBS HIGH 50: CPT | Mod: ,,, | Performed by: NURSE PRACTITIONER

## 2018-11-14 PROCEDURE — 80053 COMPREHEN METABOLIC PANEL: CPT

## 2018-11-14 PROCEDURE — 97165 OT EVAL LOW COMPLEX 30 MIN: CPT

## 2018-11-14 PROCEDURE — 11000001 HC ACUTE MED/SURG PRIVATE ROOM

## 2018-11-14 PROCEDURE — 25000003 PHARM REV CODE 250: Performed by: INTERNAL MEDICINE

## 2018-11-14 PROCEDURE — 63600175 PHARM REV CODE 636 W HCPCS: Performed by: FAMILY MEDICINE

## 2018-11-14 PROCEDURE — 97161 PT EVAL LOW COMPLEX 20 MIN: CPT

## 2018-11-14 PROCEDURE — 99900035 HC TECH TIME PER 15 MIN (STAT)

## 2018-11-14 RX ORDER — ACETAMINOPHEN 10 MG/ML
1000 INJECTION, SOLUTION INTRAVENOUS ONCE
Status: COMPLETED | OUTPATIENT
Start: 2018-11-14 | End: 2018-11-14

## 2018-11-14 RX ORDER — LANOLIN ALCOHOL/MO/W.PET/CERES
400 CREAM (GRAM) TOPICAL ONCE
Status: COMPLETED | OUTPATIENT
Start: 2018-11-14 | End: 2018-11-14

## 2018-11-14 RX ORDER — SODIUM BICARBONATE 650 MG/1
1300 TABLET ORAL 2 TIMES DAILY
Status: DISCONTINUED | OUTPATIENT
Start: 2018-11-14 | End: 2018-11-19 | Stop reason: HOSPADM

## 2018-11-14 RX ADMIN — ATORVASTATIN CALCIUM 80 MG: 40 TABLET, FILM COATED ORAL at 08:11

## 2018-11-14 RX ADMIN — SODIUM BICARBONATE 650 MG TABLET 1300 MG: at 08:11

## 2018-11-14 RX ADMIN — VITAMIN D, TAB 1000IU (100/BT) 2000 UNITS: 25 TAB at 08:11

## 2018-11-14 RX ADMIN — SODIUM BICARBONATE 650 MG TABLET 1300 MG: at 10:11

## 2018-11-14 RX ADMIN — HYDROCODONE BITARTRATE AND ACETAMINOPHEN 1 TABLET: 10; 325 TABLET ORAL at 12:11

## 2018-11-14 RX ADMIN — HYDROCODONE BITARTRATE AND ACETAMINOPHEN 1 TABLET: 10; 325 TABLET ORAL at 05:11

## 2018-11-14 RX ADMIN — FERROUS GLUCONATE 324 MG: 324 TABLET ORAL at 10:11

## 2018-11-14 RX ADMIN — ENOXAPARIN SODIUM 30 MG: 100 INJECTION SUBCUTANEOUS at 05:11

## 2018-11-14 RX ADMIN — MAGNESIUM OXIDE TAB 400 MG (241.3 MG ELEMENTAL MG) 400 MG: 400 (241.3 MG) TAB at 08:11

## 2018-11-14 RX ADMIN — ACETAMINOPHEN 1000 MG: 10 INJECTION, SOLUTION INTRAVENOUS at 10:11

## 2018-11-14 RX ADMIN — CEFAZOLIN SODIUM 2 G: 2 SOLUTION INTRAVENOUS at 05:11

## 2018-11-14 RX ADMIN — OXYCODONE HYDROCHLORIDE AND ACETAMINOPHEN 500 MG: 500 TABLET ORAL at 08:11

## 2018-11-14 RX ADMIN — MAGNESIUM OXIDE TAB 400 MG (241.3 MG ELEMENTAL MG) 400 MG: 400 (241.3 MG) TAB at 02:11

## 2018-11-14 RX ADMIN — SODIUM CHLORIDE 500 ML: 0.9 INJECTION, SOLUTION INTRAVENOUS at 10:11

## 2018-11-14 RX ADMIN — PANTOPRAZOLE SODIUM 40 MG: 40 TABLET, DELAYED RELEASE ORAL at 08:11

## 2018-11-14 RX ADMIN — LUBIPROSTONE 24 MCG: 24 CAPSULE, GELATIN COATED ORAL at 05:11

## 2018-11-14 RX ADMIN — LUBIPROSTONE 24 MCG: 24 CAPSULE, GELATIN COATED ORAL at 08:11

## 2018-11-14 RX ADMIN — STANDARDIZED SENNA CONCENTRATE AND DOCUSATE SODIUM 1 TABLET: 8.6; 5 TABLET, FILM COATED ORAL at 08:11

## 2018-11-14 NOTE — NURSING
Bedside report received fromdrea of pacu. Received care of pt in bed aaox4 rates pain 8/10 to right hip will medicate pt as ordered for pain. Instructed to cough deep breathe verbalized understanding. sb 52 on telemetry. Vital signs are stable. Has on o2 2l via n/c post sx. Clear breath sounds upon auscultation no respiratory distress noted pulses palpable nonpitting edema to bilat feet. Active bowel sounds x4 quads has indwelling gardner cath patent/intact draining clear yellow urine. Left lower ext with darker dry skin noted. Pt stated blood clots in that area. Right hip drsg cdi. No distress noted call light in reach bed alarm set will continue to monitor daughter at bedside

## 2018-11-14 NOTE — ASSESSMENT & PLAN NOTE
-K+ 5.2 this AM up slightly from 5.0 today  -baseline K+ 4.2-4.6  -will continue to monitor closely; if K+ greater than 5.5 may need to hold ACEI but will continue for now

## 2018-11-14 NOTE — NURSING
Denies pain. scd machine located and bilat scds applied to ble as ordered pt demarcus well no distress noted call light in reach bed alarm set will continue to monitor daughter at bedside

## 2018-11-14 NOTE — PROGRESS NOTES
Progress Note                     [facility]   Admit Date: 11/12/2018   LOS: 2 days     SUBJECTIVE:     Subjective: The patient had Hemiarthroplasty of Right Hip yesterday with ortho after being cleared by own cardilogist Dr. Curiel. The patient said she is feeling really good, eating and drinking, pain is tolerable. Passed some gas, no bm yet. Has a gardner in place, good UOP.On breathing neb for slight high K, asymtomatic ekg no peak t-wave. Family very appreciative. Start to working with pt/ot today.       HPI:  Patient is a 83 y.o. female who presents to to the ED as a transfer from [facility] 2/2 to lack of orthopedic coverage.. Patient stood up from her chair and fell on to her right side when attempting to take a step. Patient denies any dizziness or LOC prior to fall or any head trauma, Patient was found to have a right femoral neck fracture. At presentation patient denied any HA, fever, chills, chest pain, SOB. Patient now continues to endorses right hip pain, rated 7/10, does not radiate, localized to site of injury with no loss of sensation.         Scheduled Meds:   amLODIPine  10 mg Oral QHS    ascorbic acid (vitamin C)  500 mg Oral Daily    atorvastatin  80 mg Oral Daily    benazepril  20 mg Oral Daily    enoxaparin  30 mg Subcutaneous Daily    ferrous gluconate  324 mg Oral Daily with breakfast    HYDROcodone-acetaminophen  1 tablet Oral Q6H    lubiprostone  24 mcg Oral BID WM    magnesium oxide  400 mg Oral BID    metoprolol tartrate  50 mg Oral BID    mirtazapine  15 mg Oral QHS    pantoprazole  40 mg Oral BID    senna-docusate 8.6-50 mg  1 tablet Oral Daily    sodium bicarbonate  1,300 mg Oral BID    vitamin D  2,000 Units Oral Daily     Continuous Infusions:  PRN Meds:albuterol, albuterol sulfate, bisacodyl, dextrose 50%, dextrose 50%, glucagon (human recombinant), glucose, glucose, morphine, ondansetron, sodium chloride 0.9%    Review of patient's  allergies indicates:   Allergen Reactions    Celebrex [celecoxib] Other (See Comments)     Pain down her spine       Review of Systems  See subjective    OBJECTIVE:     Vital Signs (Most Recent)  Temp: 97.6 °F (36.4 °C) (11/14/18 0409)  Pulse: 61 (11/14/18 0800)  Resp: 18 (11/14/18 0409)  BP: 113/61 (11/14/18 0409)  SpO2: (!) 94 % (11/14/18 0500)    Vital Signs Range (Last 24H):  Temp:  [94.3 °F (34.6 °C)-98.1 °F (36.7 °C)]   Pulse:  []   Resp:  [12-50]   BP: (112-185)/(60-86)   SpO2:  [90 %-97 %]     I & O (Last 24H):    Intake/Output Summary (Last 24 hours) at 11/14/2018 0949  Last data filed at 11/14/2018 0542  Gross per 24 hour   Intake 1190 ml   Output 3525 ml   Net -2335 ml     Wt Readings from Last 3 Encounters:   11/14/18 67.9 kg (149 lb 11.1 oz)   11/12/18 76.7 kg (169 lb)   11/02/18 72.5 kg (159 lb 14.4 oz)     Physical Exam:  Physical Exam   Constitutional: She is oriented to person, place, and time. She appears well-developed and well-nourished.   HENT:   Head: Normocephalic and atraumatic.   Eyes: Conjunctivae and EOM are normal. Pupils are equal, round, and reactive to light.   Neck: Normal range of motion. Neck supple.   Cardiovascular: Normal rate. Exam reveals s4.  Pulmonary/Chest: Effort normal and breath sounds normal. Slight wheezing at the bases rosalinda.  Abdominal: Soft. Bowel sounds are normal.   Musculoskeletal: She exhibits tenderness and deformity. She exhibits no edema.   Right leg packed and no sign of acute bleeding, moving toes well.  R forearm slight bleeding from IV site.  Neurological: She is alert and oriented to person, place, and time.   Skin: Skin is warm and dry. Capillary refill takes less than 2 seconds.   Psychiatric: She has a normal mood and affect.         Laboratory:  LABS  CBC  Recent Labs   Lab 11/12/18  1342 11/13/18  0440 11/14/18  0448   WBC 8.73 5.81 7.14   RBC 3.70* 3.76* 3.48*   HGB 10.8* 11.1* 10.2*   HCT 35.4* 36.6* 33.1*    161 122*   MCV 96 97 95    MCH 29.2 29.5 29.3   MCHC 30.5* 30.3* 30.8*     BMP  Recent Labs   Lab 11/13/18  0440 11/13/18  1555 11/14/18  0448    139 138   K 5.5* 5.4* 5.2*   CO2 17* 18* 17*   * 112* 112*   BUN 27* 27* 26*   CREATININE 1.8* 1.9* 2.0*   GLU 84 78 78       POCT-Glucose  POCT Glucose   Date Value Ref Range Status   11/14/2018 85 70 - 110 mg/dL Final   11/13/2018 101 70 - 110 mg/dL Final   11/13/2018 83 70 - 110 mg/dL Final   11/12/2018 95 70 - 110 mg/dL Final   11/12/2018 84 70 - 110 mg/dL Final       Recent Labs   Lab 11/13/18  0440 11/13/18  1555 11/14/18  0448   CALCIUM 9.8 10.1 9.3   MG 1.4*  --  1.4*   PHOS 2.7  --  3.2     LFT  Recent Labs   Lab 11/13/18  0440 11/13/18  1555 11/14/18  0448   PROT 6.5 7.0 5.9*   ALBUMIN 3.1* 3.2* 2.9*   BILITOT 0.6 0.7 0.4   AST 19 22 27   ALKPHOS 78 86 74   ALT 11 10 10         COAGS  Recent Labs   Lab 11/12/18  1342 11/13/18  1018   INR 1.1 1.1   APTT 32.2* 29.9     CE  Recent Labs   Lab 11/13/18  1005 11/13/18  1018 11/13/18  1323   TROPONINI 0.023 0.014 0.006     ABGs  No results for input(s): PH, PCO2, PO2, HCO3, POCSATURATED, BE in the last 24 hours.  BNP  Recent Labs   Lab 11/13/18  1323   *     UA  No results for input(s): COLORU, CLARITYU, SPECGRAV, PHUR, PROTEINUA, GLUCOSEU, BLOODU, WBCU, RBCU, BACTERIA, MUCUS in the last 24 hours.    Invalid input(s):  BILIRUBINCON  LAST HbA1c  Lab Results   Component Value Date    HGBA1C 4.8 11/12/2018         Diagnostic Results:  Recent Labs     11/12/18  1342 11/13/18  0440 11/14/18  0448   WBC 8.73 5.81 7.14   HGB 10.8* 11.1* 10.2*   HCT 35.4* 36.6* 33.1*    161 122*   MCV 96 97 95   RDW 14.9* 14.8* 14.7*       Recent Labs     11/13/18  0440 11/13/18  1555 11/14/18  0448    139 138   K 5.5* 5.4* 5.2*   * 112* 112*   CO2 17* 18* 17*   GLU 84 78 78   BUN 27* 27* 26*   CREATININE 1.8* 1.9* 2.0*   CALCIUM 9.8 10.1 9.3   PROT 6.5 7.0 5.9*   ALBUMIN 3.1* 3.2* 2.9*   BILITOT 0.6 0.7 0.4   ALKPHOS 78 86 74    AST 19 22 27   ALT 11 10 10   ANIONGAP 7* 9 9   ESTGFRAFRICA 30* 28* 26*   EGFRNONAA 26* 24* 23*       Recent Labs     11/13/18  0440 11/14/18  0448   MG 1.4* 1.4*   PHOS 2.7 3.2       Coags  Lab Results   Component Value Date    INR 1.1 11/13/2018    INR 1.1 11/12/2018    INR 1.1 01/06/2017    APTT 29.9 11/13/2018    APTT 32.2 (H) 11/12/2018    APTT 33.8 (H) 01/06/2017     Recent Labs   Lab 11/12/18  1342 11/13/18  1018   INR 1.1 1.1   APTT 32.2* 29.9       A1c:   Lab Results   Component Value Date    HGBA1C 4.8 11/12/2018   , Last Gluc:   Recent Labs   Lab 11/12/18  1748 11/12/18  1950 11/13/18  0615 11/13/18  2132 11/14/18  0613   POCTGLUCOSE 84 95 83 101 85       TSH:   Lab Results   Component Value Date    TSH 0.983 11/12/2018         Cardiac Enzymes  Recent Labs     11/13/18  1005 11/13/18  1018 11/13/18  1323   TROPONINI 0.023 0.014 0.006         Urinalysis  Urinalysis  No results for input(s): COLORU, CLARITYU, SPECGRAV, PHUR, PROTEINUA, GLUCOSEU, BILIRUBINCON, BLOODU, WBCU, RBCU, BACTERIA, MUCUS, NITRITE, LEUKOCYTESUR, UROBILINOGEN, HYALINECASTS in the last 24 hours.  No results for input(s): COLORU, CLARITYU, SPECGRAV, PHUR, PROTEINUA, GLUCOSEU, BLOODU, WBCU, RBCU, BACTERIA, MUCUS in the last 24 hours.    Invalid input(s):  BILIRUBINCON    Micro  Microbiology Results (last 7 days)     ** No results found for the last 168 hours. **             ABG  No results for input(s): PH, PCO2, PO2, HCO3, POCSATURATED, BE in the last 168 hours.      Imaging   Imaging Results    None             IP CONSULT TO NEPHROLOGY-[facility]  IP CONSULT TO ORTHOPEDIC SURGERY  IP CONSULT TO CARDIOLOGY-[facility]  IP CONSULT TO ANESTHESIOLOGY    ASSESSMENT/PLAN:   Right femoral neck fracture   -Diagnosed by xray   -Right Leg externally rotated on exam   -No notable neurologic deficits   -Popliteal and dorsalis pedis pulses intact  -Orthopedics consulted, s/p Hemiarthroplasty of Right Hip  -Continue Pain control as tolerated   working with  pt/ot     Heart Failure with preserved EF   -Previous TTE shows diastolic dysfunction. EF 55-60%. (9/2016)   -Will repeat TTE today.   -EKG today sinus  - consulted cards, f/u recs       HTN  -Home regimen: Hydralizine 50mg PO BID, Isosorbide Mono-nitrate 60mg PO qHs, Lisinopril 5mg PO daily, Carvedilol 6.25mg PO BID, Amlodipine 10mg PO daily. Eliquis 10mg PO BID and Atorvastatin 80mg PO BID  -Will adjust accordingly following assement of vitals and fluid status       CKD Grade 4  GFR 26   Bun 26/Creatinine 2.0. Down to 1.8.  Will continue to monitor  Will consult nephrology - Preparation for future dialysis?? not during this admission.     GERD  -Continue PPI      Arthritis   morphin 2mg prn q2hr, and norco 10 scheduled will give before pt/ot      Hypomagnesium   repleted    Hyperkalemia  5.2 am   Continue albuterol neb  Expect to trend down      PPx: SCDs /PENELOPE hose  Code: Full     Dc'd IVF per combined HF, pt tolerates po. Ekg sinus serjio,  Cxr, INR, PT/PTT, trops, bnp slightly higher s/p 1dose lasix, H/H stable, 2DE combined HF 35~40 with DD.      Dispo:   Working w/ PT/OT s/p Hemiarthroplasty of Right Hip, Doing well f/u cards and ortho rec.     Jose Mayes MD

## 2018-11-14 NOTE — ASSESSMENT & PLAN NOTE
-recent echo at [facility] with EF down to 30-35% with repeat echo  with EF 35-40% with WMA  -admitted last month with ADHF at [facility] with improvement with diuresis  -noted JVD and peripheral edema yesterday with IVF infusing; given IV Lasix yesterady with 3.5 liters out and negative 3.6 liters since admission   -; troponin .023-.014  -JVD improved today; appears euvolemic on exam therefore no additional IV Lasix needed; continue to monitor fluid volume status closely with strict I&Os and daily weigths  -continue CCB, BB and ACEI for now; will continue to hold Hydralazine and Imdurfor now  -proceed with ischemic evaluation after hip surgery recovery

## 2018-11-14 NOTE — PHYSICIAN QUERY
PT Name: Adilia Rosas  MR #: [id number]     Physician Query Form - Documentation Clarification      CDS/: uNbia LI, RN, CCDS               Contact information: [email or web address]    This form is a permanent document in the medical record.     Query Date: November 14, 2018    By submitting this query, we are merely seeking further clarification of documentation. Please utilize your independent clinical judgment when addressing the question(s) below.    The Medical record reflects the following:    Supporting Clinical Findings Location in Medical Record      Potassium 5.0 --> 5.5--> 5.4 --> 5.2     On breathing neb for slight high K, asymtomatic ekg no peak t-wave.        Lab 11/12/18     Hospital Medicine Progress 11/14/18                                                                                Doctor, Please specify diagnosis or diagnoses associated with above clinical findings.    Provider Use Only         [ x ] Hyperkalemia       [  ] Other related diagnosis (please specify): _________________________                                                                                                                  Clinically Undetermined

## 2018-11-14 NOTE — PT/OT/SLP EVAL
Physical Therapy Evaluation    Patient Name:  Adilia Rosas   MRN:  [id number]    Recommendations:     Discharge Recommendations:  nursing facility, skilled   Discharge Equipment Recommendations: bath bench   Barriers to discharge: Decreased caregiver support    Assessment:     Adilia Rosas is a 83 y.o. female admitted with a medical diagnosis of Fracture of femur.  She presents with the following impairments/functional limitations:  weakness, gait instability, decreased lower extremity function, decreased ROM, impaired endurance, impaired balance, impaired self care skills, impaired functional mobilty, pain, impaired skin, edema . Patient able to take 4 steps with RW min assist forward/backward.    Rehab Prognosis:  good; patient would benefit from acute skilled PT services to address these deficits and reach maximum level of function.      Recent Surgery: Procedure(s) (LRB):  HEMIARTHROPLASTY, HIP (Right) 1 Day Post-Op    Plan:     During this hospitalization, patient to be seen BID to address the above listed problems via therapeutic activities, therapeutic exercises, gait training  · Plan of Care Expires:  12/14/18   Plan of Care Reviewed with: patient, grandchild(francy)    Subjective     Communicated with primary nurse prior to session.  Patient found supine  upon PT entry to room, agreeable to evaluation.      Chief Complaint: pain  Patient comments/goals: go home  Pain/Comfort:  · Pain Rating 1: other (see comments)(did not rate on scale)  · Location - Side 1: Right  · Location - Orientation 1: generalized  · Location 1: hip  · Pain Addressed 1: Distraction, Reposition    Patients cultural, spiritual, Mosque conflicts given the current situation:      Living Environment:  Lives with grandchildren double story home able to live on 1st floor  Prior to admission, patients level of function was independent.  Patient has the following equipment: bedside commode, wheelchair, walker, rolling.  DME owned (not  currently used): none.  Upon discharge, patient will have assistance from family.    Objective:     Patient found with: PureWick, oxygen     General Precautions: Standard, fall   Orthopedic Precautions:RLE weight bearing as tolerated   Braces: N/A     Exams:  · RLE ROM: limited hip active and passive   · RLE Strength: poor +control with transtional mvts and gait   · LLE ROM: limited active and passive knee  · LLE Strength: 3/5 to -4/5    Functional Mobility:  · Bed Mobility:     · Supine to Sit: maximal assistance  · Sit to Supine: maximal assistance  · Transfers:     · Sit to Stand:  moderate assistance and of 2 persons with rolling walker  · Gait: with RW steps forward/backward with min assist  · Balance: poor + with RW    AM-PAC 6 CLICK MOBILITY  Total Score:15       Therapeutic Activities and Exercises:   na    Patient left HOB elevated with all lines intact, call button in reach and bed alarm on.    GOALS:   Multidisciplinary Problems     Physical Therapy Goals        Problem: Physical Therapy Goal    Goal Priority Disciplines Outcome Goal Variances Interventions   Physical Therapy Goal     PT, PT/OT Ongoing (interventions implemented as appropriate)     Description:  Goals to be met by: 2018     Patient will increase functional independence with mobility by performin. Supine to sit with Modified [place]  2. Sit to stand transfer with Stand-by Assistance  3. Bed to chair transfer with Stand-by Assistance using Rolling Walker  4. Gait  x 50 feet with Stand-by Assistance using Rolling Walker.                       History:     Past Medical History:   Diagnosis Date    Arthritis     Elevated cholesterol     GERD (gastroesophageal reflux disease)     History of DVT (deep vein thrombosis) 2016    RUE    Hypertension        Past Surgical History:   Procedure Laterality Date    APPLICATION-CAST LONG LEG Left 3/10/2017    [facility]    APPLICATION-EXTERNAL  FIXATION DEVICE; knee spanning Left 12/27/2016    Performed by [facility]    EXPLORATORY-LAPAROTOMY, possible bowel resection N/A 9/19/2016    Performed by Katie Solares [facility] at [facility]    HEMIARTHROPLASTY OF HIP Right 11/13/2018    Procedure: HEMIARTHROPLASTY, HIP;  Surgeon: Jose Antonio Franklin MD;  Location: [facility] OR;  Service: Orthopedics;  Laterality: Right;    HEMIARTHROPLASTY, HIP Right 11/13/2018    Performed by [facility] at [facility] OR    HERNIA REPAIR      HYSTERECTOMY      IRRIGATION AND DEBRIDEMENT LOWER EXTREMITY Left 1/7/2017    [facility] by [facility]    IRRIGATION AND DEBRIDEMENT LOWER EXTREMITY; placement of antibiotic beads Left 12/27/2016    Performed by [facility]    IRRIGATION AND DEBRIDEMENT LOWER EXTREMITY; possible antibiotic bead placement; possible wound vac Left 12/26/2016    Per[facility] by [facility] at [facility] OR    LAPAROSCOPY-DIAGNOSTIC N/A 9/19/2016    Performed by Katie [facility]    LYSIS-ADHESION N/A 9/19/2016    Performed by [facility]    OPEN REDUCTION INTERNAL FIXATION-FEMUR Left 12/9/2016    [facility]formed [facility]    ORIF FEMUR FRACTURE Left 12/2016    PLACEMENT SPACER-ANTIBIOTIC Left 1/7/2017    Per[facility]    PLACEMENT SPACER-ANTIBIOTIC Left 12/27/2016    Performed by [facility] at [facility]    REMOVAL OF EXTERNAL FIXATION DEVICE Left 3/10/2017    Performed by [facility] at [facility] OR    REMOVAL-HARDWARE-LEG Left 12/27/2016    Performed by [facility] Dent [facility] at [facility]    REMOVAL-SUTURE Left 3/10/2017    Performed by [facility] at [facility] OR       Clinical Decision Making:     History  Co-morbidities and personal factors that may impact the plan of care Examination  Body Structures and Functions, activity limitations and participation restrictions that may impact the plan  of care Clinical Presentation   Decision Making/ Complexity Score   Co-morbidities:   [] Time since onset of injury / illness / exacerbation  [] Status of current condition  []Patient's cognitive status and safety concerns    [x] Multiple Medical Problems (see med hx)  Personal Factors:   [x] Patient's age  [x] Prior Level of function   [] Patient's home situation (environment and family support)  [] Patient's level of motivation  [] Expected progression of patient      HISTORY:(criteria)    [] 25315 - no personal factors/history    [] 31811 - has 1-2 personal factor/comorbidity     [x] 79345 - has >3 personal factor/comorbidity     Body Regions:  [] Objective examination findings  [] Head     []  Neck  [] Trunk   [] Upper Extremity  [] Lower Extremity    Body Systems:  [] For communication ability, affect, cognition, language, and learning style: the assessment of the ability to make needs known, consciousness, orientation (person, place, and time), expected emotional /behavioral responses, and learning preferences (eg, learning barriers, education  needs)  [x] For the neuromuscular system: a general assessment of gross coordinated movement (eg, balance, gait, locomotion, transfers, and transitions) and motor function  (motor control and motor learning)  [x] For the musculoskeletal system: the assessment of gross symmetry, gross range of motion, gross strength, height, and weight  [] For the integumentary system: the assessment of pliability(texture), presence of scar formation, skin color, and skin integrity  [x] For cardiovascular/pulmonary system: the assessment of heart rate, respiratory rate, blood pressure, and edema     Activity limitations:    [] Patient's cognitive status and saf ety concerns          [] Status of current condition      [] Weight bearing restriction  [] Cardiopulmunary Restriction    Participation Restrictions:   [] Goals and goal agreement with the patient     [] Rehab potential  (prognosis) and probable outcome      Examination of Body System: (criteria)    [] 30361 - addressing 1-2 elements    [x] 80695 - addressing a total of 3 or more elements     [] 25872 -  Addressing a total of 4 or more elements         Clinical Presentation: (criteria)  Stable - 98131     On examination of body system using standardized tests and measures patient presents with 3 or more elements from any of the following: body structures and functions, activity limitations, and/or participation restrictions.  Leading to a clinical presentation that is considered stable and/or uncomplicated                              Clinical Decision Making  (Eval Complexity):  Low- 21177     Time Tracking:     PT Received On: 11/14/18  PT Start Time: 1330     PT Stop Time: 1349  PT Total Time (min): 19 min     Billable Minutes: Evaluation 19      Rylan Membreno, PT  11/14/2018

## 2018-11-14 NOTE — PLAN OF CARE
Problem: Occupational Therapy Goal  Goal: Occupational Therapy Goal  Goals to be met by: 12/14/18     Patient will increase functional independence with ADLs by performing:    Grooming while standing with Contact Guard Assistance.  Toileting from bedside commode with Minimal Assistance for hygiene and clothing management.   Supine to sit with Minimal Assistance.  Step transfer with Contact Guard Assistance  Toilet transfer to bedside commode with Contact Guard Assistance.  Upper extremity exercise program x10 reps per handout, with independence.    Outcome: Ongoing (interventions implemented as appropriate)  Pt would benefit from cont OT services in order to maximize functional independence. Recommending SNF at d/c.

## 2018-11-14 NOTE — NURSING
Has c/on pain 8/10 to right hip medicated with scheduled pain pill as ordered demarcus well. Consuming water well denies nausea. No distress noted call light in reach bed alarm set will continue to monitor daughter at beside.

## 2018-11-14 NOTE — NURSING
In bed reports feels much better. Has no c/o pain at this time. No distress noted call light in reach bed alarm set will continue to monitor daughter at bedside,

## 2018-11-14 NOTE — PLAN OF CARE
Problem: Physical Therapy Goal  Goal: Physical Therapy Goal  Goals to be met by: 2018     Patient will increase functional independence with mobility by performin. Supine to sit with Modified [place]  2. Sit to stand transfer with Stand-by Assistance  3. Bed to chair transfer with Stand-by Assistance using Rolling Walker  4. Gait  x 50 feet with Stand-by Assistance using Rolling Walker.     Outcome: Ongoing (interventions implemented as appropriate)  Recommend SNF

## 2018-11-14 NOTE — OP NOTE
[facility] Operative Note    PREOP DX: Right Femoral Neck Fracture    POSTOP DX: Right Femoral Neck Fracture    PROCEDURE: Hemiarthroplasty of Right Hip    SURGEON: Jose Antonio Franklin MD    ASSISTANTS: Akil Baker MD, Anuj Gudino    ANESTHESIA: GETA    COMPLICATIONS: NONE    EBL: 100cc    Implant:  [place] bipolar hemiarthroplasty, Omnifit size 6    INDICATIONS:    Mrs. Rosas is a, 83 year old female who sustained a right femoral neck fracture after mechanical fall on 11/13/2018, presented to ED on 11/13/2018. She was indicated for a hemiarthroplasty of the right hip and was taken to the OR on 11/13/2018 when she was cleared by the medicine team after being medically optimized for surgery. The risks, benefits, complications, treatment options, and expected outcomes were reviewed with the patient. The risks and potential complications of their problem and purposed treatment include but are not limited to infection, nerve injury, vascular injury, leg length discrepancy, leg rotational discrepancy, persistent pain, potential skin necrosis, deep vein thrombosis, possible pulmonary embolus, complications of the anesthetics and failure of the implant. The patient consented to proceed.      PROCEDURE IN DETAIL:  The patient was taken to the OR and given general anesthesia on her hospital bed. She was then transferred to the standard OR table and positioned in the left lateral decubitus position on the peg board. Each of the four supporting pegs were padded with padding to avoid skin and pressure-related complications. The nonoperative leg was padded beneath the ankle and knee and secured to the table. The patient's right lower extremity was then prepped and draped in the usual sterile fashion. Perioperative antibiotics were delivered within 30 minutes prior to incision. After draping the patient, duraprep was reapplied to the surgical site and the incision was marked with a skin marker. Ioban was then applied. The  "patient, site, allergies, fire risk, perioperative antibiotics, person performing the prep, and surgical team were identified during the time out.    A 10-blade scalpel was used to make the standard anterolateral approach incision. The dissection was carried through the subcutaneous adipose layer. Meticulous hemostasis maintained throughout the case using electrocautery. A crane elevator was used to remove residual adipose tissue from the surface of the fascia mati. When the fascia mati layer was sufficiently visible, this layer was incised using bovie inline with skin incision. We bluntly dissected under fascia mati, and clean off bursa, identifying vastus, gluteus medius and minimus. Bovie was used to cut inline with vastus, curving gently proximally between medius and minimus. A flap of tissue along the anterolateral trochanteric region was developed of rectus, medius, and minimus tissue, being careful not to violate capsule. With the capsule exposed, a "T" shaped capsulotomy was created with careful attention to minimize damage to the labrum of the acetabulum. A stay suture was placed in the capsule using vicryl. The leg was internally rotated, flexed, and adducted to better expose the head fragment. A corkscrew was then inserted into the head fragment. The head fragment was then manipulated and release from the acetabulum. Calipers were used to measure the head fragment and revealed the head to be 47 mm in size. The size 47mm trial head sizer was then inserted into the acetabulum to confirm correct sizing. Excellent suction seal of the size 47mm head trial in the acetabulum confirmed this as the appropriate size head for the prosthesis.    Our attention was next directed to the femoral neck cut. The femoral neck was delivered out of the wound. Using electrocautery, the level of the femoral neck cut was marked on the femoral neck 1 cm above the level of the lesser trochanter. A saw was used to make the femoral " neck cut. The canal finder was then inserted into the medullary canal of the femur, in order to define the canal prior to reaming. The axial starter reamer was inserted into the femoral medullary canal through the trochanteric fossa, then removed. The proximal canal was then reamed by biasing the trochanteric reamer posterior-laterally. Tapered reamers were then used to ream the medullary canal sequentially starting with the size 4 reamer and increasing to the size 6 reamer. The medullary canal was then broached to just above a size 6 stem, which seated well. The decision was then made to cement the size 6 prosthesis in place. The calcar planar was then applied to the post of the broach and the calcar was leveled to optimize contact between the stem collar and calcar.    The broach was then removed. The canal was then irrigated with normal saline and dried. The cement plug was inserted into the canal approximately 2cm distal to where the level of the stem tip would be. The distal cement spacer for the stem was trialed. The canal was thoroughly irrigated and dried with a lap sponge. The femoral component pieces were opened on the back table (size 6 stem, with a size [id number] bipolar head). The Alayna simplex with antibiotics bone cement was then prepared on the back table. When the cement had attained a doughy consistency, it was applied to the canal using a cement gun, with carefull attention not to create voids in the cement mantle. The assembled femoral component was then inserted into the canal using the femoral stem  device. Careful attention was paid to positioning the implant centrally in the canal without varus or valgus angulation. The implant was held steady with constant axial pressure until the cement had dried. Excess cement was removed. The neck was assembled to the stem. The trunnion was cleaned using a dry lap, and the bipolar head was applied to the neck. A head impactor was used to impact  the head onto the trunnion with light blows from a mallet. The acetabulum was irrigated and suctioned clean. The prosthesis was then reduced with carefull attention to avoid soft tissue interposition between the head and the acetabulum. The trial was ranged and found to be stable. The leg length was assess and the RLE was clinically equal to the contralateral size. Appropriate placement of all the implants as well as length and rotation again confirmed with exam.    A this point capsule was closed with vicryl sutures. The flap of vastus and gluteus medius/minumus was reapproiximated to femur using bone tunnels and #5 suture. Vicryl was used to close the IT band and fascial mati. Size 2-0 vicryl was used to close the wound with interrupted deep dermal sutures. The skin was closed with monocryl. The wounds were dressed with sterile dressing. The patient was then placed on her bed. The patient was awoken from anesthesia, tolerated the procedure and taken to recovery in stable condition. Dr. Franklin was present and scrubbed during all key portions of the case.    Post operative plan:  Patient will be returned to the Medicine service. She is to be WBAT to the RLE. PT/OT consult ordered.

## 2018-11-14 NOTE — NURSING
Now dose of magnesium 400mg po adm as ordered pt demarcus well. Applied bilat juanita hose to lower exts awaiting scd machine. Pt cuurently has no c/o pain denies nausea. No distress noted call light in reach bed alarm set. Will continue to monitor daughter at bedside.

## 2018-11-14 NOTE — PROGRESS NOTES
[facility]  Cardiology  Progress Note    Patient Name: Adilia Rosas  MRN: [id number]  Admission Date: 11/12/2018  Hospital Length of Stay: 2 days  Code Status: Full Code   Attending Physician: Oksana Sow MD   Primary Care Physician: Ely Dubois MD  Expected Discharge Date:   Principal Problem:Fracture of femur    Subjective:     Hospital Course:   11/12/2018 Presented to the ER at [facility] following fall. Xray with evidence of femur fracture and transferred to [facility] for orthopedic evaluation. Admitted to [facility] and placed on telemetry on 5th floor. Echocardiogram with moderately depressed LV function with EF 35-40%, grade I diastolic dysfunction, mild AI/MR/TR and WMA (akines to mid inferior wall and mid posterior wall). Previous echo at [facility] 10/15/2018 with LVEF 30-35% and no mention of WMA. BP elevated at 160s-180s/90s upon admission ?pain related  11/13/2018 K+ 5.5 creatinine 1.8  (baseline 1.6-1.9). BP down to 130s/70s HR 50s-60s with no arrhythmias noted on telemetry overnight. Troponin .023 and BNP pending. Cardiology consulted for preoperative clearance   11/14/2018 Underwent right hip surgery yesterday. Currently reports pain controlled with pain meds. No complaints of chest pain or SOB overnight. HR and BP stable. No arrhythmias noted on telemetry overnight. V Lasix yesterday with 3.5 liters out and negative 3.6 liters since admission-JVD improved. K+ 5.2 with BUN 26 creatinine 2.0 H&H stable at baseline         Review of Systems   Constitution: Negative for chills, decreased appetite, diaphoresis, fever and weakness.   Cardiovascular: Negative for chest pain, claudication, cyanosis, dyspnea on exertion, irregular heartbeat, leg swelling, near-syncope, orthopnea, palpitations, paroxysmal nocturnal dyspnea and syncope.   Respiratory: Negative for cough, hemoptysis, shortness of breath and wheezing.    Gastrointestinal: Negative for bloating, abdominal pain,  constipation, diarrhea, melena, nausea and vomiting.   Neurological: Negative for dizziness.     Objective:     Vital Signs (Most Recent):  Temp: 97.6 °F (36.4 °C) (11/14/18 0409)  Pulse: 69 (11/14/18 1034)  Resp: 18 (11/14/18 0409)  BP: 112/60 (11/14/18 1034)  SpO2: (!) 94 % (11/14/18 0500) Vital Signs (24h Range):  Temp:  [94.3 °F (34.6 °C)-98.1 °F (36.7 °C)] 97.6 °F (36.4 °C)  Pulse:  [] 69  Resp:  [12-50] 18  SpO2:  [90 %-97 %] 94 %  BP: (112-185)/(60-86) 112/60     Weight: 67.9 kg (149 lb 11.1 oz)  Body mass index is 25.69 kg/m².     SpO2: (!) 94 %  O2 Device (Oxygen Therapy): nasal cannula      Intake/Output Summary (Last 24 hours) at 11/14/2018 1034  Last data filed at 11/14/2018 0542  Gross per 24 hour   Intake 1190 ml   Output 3525 ml   Net -2335 ml       Lines/Drains/Airways     Peripherally Inserted Central Catheter Line                 PICC Double Lumen 12/28/16 1630 right basilic 685 days          Drain                 Urethral Catheter 11/12/18 1330 Non-latex 14 Fr. 1 day          Peripheral Intravenous Line                 Peripheral IV - Single Lumen 03/10/17 0655 Left Wrist 614 days         Peripheral IV - Single Lumen 11/13/18 1335 Left Antecubital less than 1 day                Physical Exam   Constitutional: She is oriented to person, place, and time. She appears well-developed and well-nourished. No distress.   Neck: No JVD present.   Cardiovascular: Normal rate and regular rhythm. Exam reveals no gallop.   No murmur heard.  Pulmonary/Chest: Effort normal and breath sounds normal. No respiratory distress. She has no wheezes.   Abdominal: Soft. Bowel sounds are normal. She exhibits no distension. There is no tenderness.   Musculoskeletal: She exhibits edema.   Neurological: She is alert and oriented to person, place, and time.   Skin: Skin is warm and dry.       Significant Labs:     Recent Labs   Lab 11/14/18  0448   CALCIUM 9.3   PROT 5.9*      K 5.2*   CO2 17*   *   BUN 26*    CREATININE 2.0*   ALKPHOS 74   ALT 10   AST 27   BILITOT 0.4     Recent Labs   Lab 11/14/18  0448   WBC 7.14   RBC 3.48*   HGB 10.2*   HCT 33.1*   *   MCV 95   MCH 29.3   MCHC 30.8*       Significant Imaging: Echocardiogram:       · The left ventricle cavity is moderately dilated.  · Left ventricle shows mild concentric hypertrophy.  · Left ventricle ejection fraction is moderately decreased at 35-40%  · Grade I (mild) left ventricular diastolic dysfunction consistent with impaired relaxation.  · LA pressure is normal.  · Local segmental wall motion abnormalities. The mid inferior wall and mid LVPW are akinetic  · Left atrium is moderately dilated.  · RV systolic function is normal.  · Right atrium is mildly dilated.  · Mild aortic regurgitation.  · Mild mitral regurgitation.  · Mild tricuspid regurgitation.  · Pulmonic valve shows mild regurgitation.  · Elevated central venous pressure (15 mm Hg).  · The estimated PA systolic pressure is 59.09 mm Hg  · Pulmonary hypertension present.  · No pericardial effusion.    Assessment and Plan:     Brief HPI: Seen this morning on AM NP rounds with family at the bedside. Denies any complaints of chest pain or SOB currently. Reviewed cardiac POC with patient and family as detailed below-verbalized understanding and agrees with POC     Hyperkalemia    -K+ 5.2 this AM up slightly from 5.0 today  -baseline K+ 4.2-4.6  -will continue to monitor closely; if K+ greater than 5.5 may need to hold ACEI but will continue for now      Femoral neck fracture    -managed by Orthopedics      Acute on chronic combined systolic and diastolic congestive heart failure, NYHA class 4    -recent echo at [facility] with EF down to 30-35% with repeat echo  with EF 35-40% with WMA  -admitted last month with ADHF at [facility] with improvement with diuresis  -noted JVD and peripheral edema yesterday with IVF infusing; given IV Lasix yesterady with 3.5 liters out and negative 3.6 liters since admission    -; troponin .023-.014  -JVD improved today; appears euvolemic on exam therefore no additional IV Lasix needed; continue to monitor fluid volume status closely with strict I&Os and daily weigths  -continue CCB, BB and ACEI for now; will continue to hold Hydralazine and Imdurfor now  -proceed with ischemic evaluation after hip surgery recovery      Chronic kidney disease (CKD) stage G3b/A1, moderately decreased glomerular filtration rate (GFR) between 30-44 mL/min/1.73 square meter and albuminuria creatinine ratio less than 30 mg/g    -creatinine 2.0 this AM up slightly from 1.8 yesterday  -baseline creatinine 1.6-1.9       Essential hypertension    -BP stable overnight  -continue CCB, BB and ACEI            VTE Risk Mitigation (From admission, onward)        Ordered     enoxaparin injection 30 mg  Daily      11/13/18 0649     IP VTE HIGH RISK PATIENT  Once      11/12/18 1723     Place sequential compression device  Until discontinued      11/12/18 1723          BETHANY Cyr, ANP  Cardiology  [facility]

## 2018-11-14 NOTE — SUBJECTIVE & OBJECTIVE
Review of Systems   Constitution: Negative for chills, decreased appetite, diaphoresis, fever and weakness.   Cardiovascular: Negative for chest pain, claudication, cyanosis, dyspnea on exertion, irregular heartbeat, leg swelling, near-syncope, orthopnea, palpitations, paroxysmal nocturnal dyspnea and syncope.   Respiratory: Negative for cough, hemoptysis, shortness of breath and wheezing.    Gastrointestinal: Negative for bloating, abdominal pain, constipation, diarrhea, melena, nausea and vomiting.   Neurological: Negative for dizziness.     Objective:     Vital Signs (Most Recent):  Temp: 97.6 °F (36.4 °C) (11/14/18 0409)  Pulse: 69 (11/14/18 1034)  Resp: 18 (11/14/18 0409)  BP: 112/60 (11/14/18 1034)  SpO2: (!) 94 % (11/14/18 0500) Vital Signs (24h Range):  Temp:  [94.3 °F (34.6 °C)-98.1 °F (36.7 °C)] 97.6 °F (36.4 °C)  Pulse:  [] 69  Resp:  [12-50] 18  SpO2:  [90 %-97 %] 94 %  BP: (112-185)/(60-86) 112/60     Weight: 67.9 kg (149 lb 11.1 oz)  Body mass index is 25.69 kg/m².     SpO2: (!) 94 %  O2 Device (Oxygen Therapy): nasal cannula      Intake/Output Summary (Last 24 hours) at 11/14/2018 1034  Last data filed at 11/14/2018 0542  Gross per 24 hour   Intake 1190 ml   Output 3525 ml   Net -2335 ml       Lines/Drains/Airways     Peripherally Inserted Central Catheter Line                 PICC Double Lumen 12/28/16 1630 right basilic 685 days          Drain                 Urethral Catheter 11/12/18 1330 Non-latex 14 Fr. 1 day          Peripheral Intravenous Line                 Peripheral IV - Single Lumen 03/10/17 0655 Left Wrist 614 days         Peripheral IV - Single Lumen 11/13/18 1335 Left Antecubital less than 1 day                Physical Exam   Constitutional: She is oriented to person, place, and time. She appears well-developed and well-nourished. No distress.   Neck: No JVD present.   Cardiovascular: Normal rate and regular rhythm. Exam reveals no gallop.   No murmur heard.  Pulmonary/Chest:  Effort normal and breath sounds normal. No respiratory distress. She has no wheezes.   Abdominal: Soft. Bowel sounds are normal. She exhibits no distension. There is no tenderness.   Musculoskeletal: She exhibits edema.   Neurological: She is alert and oriented to person, place, and time.   Skin: Skin is warm and dry.       Significant Labs:     Recent Labs   Lab 11/14/18  0448   CALCIUM 9.3   PROT 5.9*      K 5.2*   CO2 17*   *   BUN 26*   CREATININE 2.0*   ALKPHOS 74   ALT 10   AST 27   BILITOT 0.4     Recent Labs   Lab 11/14/18  0448   WBC 7.14   RBC 3.48*   HGB 10.2*   HCT 33.1*   *   MCV 95   MCH 29.3   MCHC 30.8*       Significant Imaging: Echocardiogram:       · The left ventricle cavity is moderately dilated.  · Left ventricle shows mild concentric hypertrophy.  · Left ventricle ejection fraction is moderately decreased at 35-40%  · Grade I (mild) left ventricular diastolic dysfunction consistent with impaired relaxation.  · LA pressure is normal.  · Local segmental wall motion abnormalities. The mid inferior wall and mid LVPW are akinetic  · Left atrium is moderately dilated.  · RV systolic function is normal.  · Right atrium is mildly dilated.  · Mild aortic regurgitation.  · Mild mitral regurgitation.  · Mild tricuspid regurgitation.  · Pulmonic valve shows mild regurgitation.  · Elevated central venous pressure (15 mm Hg).  · The estimated PA systolic pressure is 59.09 mm Hg  · Pulmonary hypertension present.  · No pericardial effusion.

## 2018-11-14 NOTE — PT/OT/SLP EVAL
"Occupational Therapy   Evaluation    Name: Adilia Rosas  MRN: [id number]  Admitting Diagnosis:  Fracture of femur 1 Day Post-Op    Recommendations:     Discharge Recommendations: nursing facility, skilled  Discharge Equipment Recommendations:  bath bench  Barriers to discharge:  Decreased caregiver support, Inaccessible home environment    History:     Occupational Profile:  Living Environment: Pt lives with granddghtr in 2 story condo, threshold to enter, 1/2 bath downstairs. Pt unable to navigate stairs at home so has set up a "bedroom" downstairs   Previous level of function: pt reports mod I on 1st floor level with DME  Equipment Used at Home:  walker, rolling, bedside commode, wheelchair, hospital bed   Assistance upon Discharge: limited; granddghtrs work and pt is home alone with 3 young grandchildren during the day     Past Medical History:   Diagnosis Date    Arthritis     Elevated cholesterol     GERD (gastroesophageal reflux disease)     History of DVT (deep vein thrombosis) 2016    RUE    Hypertension        Past Surgical History:   Procedure Laterality Date    APPLICATION-CAST LONG LEG Left 3/10/2017    Performed by [facility] at [facility]    APPLICATION-EXTERNAL FIXATION DEVICE; knee spanning Left 12/27/2016    Performed by Jose Dent MD at [facility] OR    EXPLORATORY-LAPAROTOMY, possible bowel resection N/A 9/19/2016    Performed by Katie Solares MD at [facility] OR    HEMIARTHROPLASTY OF HIP Right 11/13/2018    Procedure: HEMIARTHROPLASTY, HIP;  Surgeon: Jose Antonio Franklin MD;  Location: [facility] OR;  Service: Orthopedics;  Laterality: Right;    HEMIARTHROPLASTY, HIP Right 11/13/2018    Performed by [facility] at [facility] OR    HERNIA REPAIR      HYSTERECTOMY      IRRIGATION AND DEBRIDEMENT LOWER EXTREMITY Left 1/7/2017    Per[facility] by [facility] at [facility]    IRRIGATION AND DEBRIDEMENT LOWER EXTREMITY; placement of antibiotic beads Left 12/27/2016    Performed by " Jose eDnt [facility] at [facility] OR    IRRIGATION AND DEBRIDEMENT LOWER EXTREMITY; possible antibiotic bead placement; possible wound vac Left 12/26/2016    Performed by Jose Dent [facility] at [facility] OR    LAPAROSCOPY-DIAGNOSTIC N/A 9/19/2016    Performed by Katie Solares MD at [facility] OR    LYSIS-ADHESION N/A 9/19/2016    Performed by Katie [facility] at [facility] OR    OPEN REDUCTION INTERNAL FIXATION-FEMUR Left 12/9/2016    Performed by [facility] at [facility] OR    ORIF FEMUR FRACTURE Left 12/2016    PLACEMENT SPACER-ANTIBIOTIC Left 1/7/2017    Performed by [facility] at [facility] OR    PLACEMENT SPACER-ANTIBIOTIC Left 12/27/2016    Performed by Jose Dent [facility] at [facility] OR    REMOVAL OF EXTERNAL FIXATION DEVICE Left 3/10/2017    Performed by [facility] Antonio Franklin MD at [facility] OR    REMOVAL-HARDWARE-LEG Left 12/27/2016    Performed by Jose Dent [facility] at [facility] OR    REMOVAL-SUTURE Left 3/10/2017    Performed by Jose Antonio Franklin MD at [facility] OR       Subjective     Chief Complaint: pt reports pain and she is fearful to participate as she does not want to fall; granddghtr reports at home, pt is also fearful to participate in axs   Patient/Family Comments/goals: return home     Pain/Comfort:  · Pain Rating 1: (did not rate )  · Location - Side 1: Right  · Location - Orientation 1: generalized  · Location 1: hip  · Pain Addressed 1: Pre-medicate for activity, Reposition, Nurse notified, Distraction, Cessation of Activity    Patients cultural, spiritual, Latter day conflicts given the current situation:      Objective:     Communicated with: nsg prior to session.    General Precautions: Standard, fall   Orthopedic Precautions:RLE weight bearing as tolerated   Braces: N/A     Occupational Performance:    Bed Mobility:    · Patient completed Scooting/Bridging with maximal assistance  · Patient completed Supine to Sit with maximal assistance  · Patient completed Sit to Supine with maximal  "assistance    Functional Mobility/Transfers:  · Patient completed Sit <> Stand Transfer with moderate assistance and of 2 persons  with  rolling walker  (2 persons for safety)   · Functional Mobility: Min A with RW    Activities of Daily Living:  · Lower Body Dressing: total assistance    · Toileting: total assistance      Cognitive/Visual Perceptual:  Cognitive/Psychosocial Skills:     -       Oriented to: Person, Place, Time and Situation   -       Follows Commands/attention:Follows multistep  commands  -       Communication: clear/fluent  -       Memory: No Deficits noted  -       Safety awareness/insight to disability: intact   -       Mood/Affect/Coping skills/emotional control: Appropriate to situation; fearful    Physical Exam:  Balance:    -       static sitting balance SBA; Min A standing during ambulation   Postural examination/scapula alignment:    -       Rounded shoulders  -       Forward head  Skin integrity: Wound surgical R hip  Edema:  Moderate BLEs  Dominant hand:    -       right  Upper Extremity Range of Motion: BUE ROM significantly limited at shoulders; WFL distally   Upper Extremity Strength: BUE strength limited at shoulders; WFL distally based on observed function  LLE knee fixed in extension and unable to bend    Strength:  Good     AMPAC 6 Click ADL:  AMPAC Total Score: 15    Treatment & Education:  Max A for bed mobility for assist with BLEs  Stand from EOB requiring elevated bed height 2/2 R knee locked into extension from previous injuries; mod A of 2 to stand for safety with RW   Min A for lateral "sliding: steps to HOB  Min A forward and back steps EOB   Total A LBD and toileting   Education:    Patient left supine with all lines intact, call button in reach, bed alarm on, nsg notified and family  present    Assessment:     Adilia Rosas is a 83 y.o. female with a medical diagnosis of Fracture of femur.  She presents with the following performance deficits affecting function: " "weakness, impaired self care skills, impaired balance, impaired functional mobilty, impaired endurance, gait instability, decreased lower extremity function, decreased upper extremity function, decreased safety awareness, decreased ROM, impaired skin, edema, pain, orthopedic precautions, impaired joint extensibility, impaired cardiopulmonary response to activity.  Pt would benefit from cont OT services in order to maximize functional independence. Recommending SNF at d/c.     Rehab Prognosis: Good; patient would benefit from acute skilled OT services to address these deficits and reach maximum level of function.         Clinical Decision Makin.  OT Low:  "Pt evaluation falls under low complexity for evaluation coding due to performance deficits noted in 1-3 areas as stated above and 0 co-morbities affecting current functional status. Data obtained from problem focused assessments. No modifications or assistance was required for completion of evaluation. Only brief occupational profile and history review completed."     Plan:     Patient to be seen 5 x/week to address the above listed problems via self-care/home management, therapeutic activities, therapeutic exercises  · Plan of Care Expires: 18  · Plan of Care Reviewed with: patient, grandchild(francy)    This Plan of care has been discussed with the patient who was involved in its development and understands and is in agreement with the identified goals and treatment plan    GOALS:   Multidisciplinary Problems     Occupational Therapy Goals        Problem: Occupational Therapy Goal    Goal Priority Disciplines Outcome Interventions   Occupational Therapy Goal     OT, PT/OT Ongoing (interventions implemented as appropriate)    Description:  Goals to be met by: 18     Patient will increase functional independence with ADLs by performing:    Grooming while standing with Contact Guard Assistance.  Toileting from bedside commode with Minimal Assistance " for hygiene and clothing management.   Supine to sit with Minimal Assistance.  Step transfer with Contact Guard Assistance  Toilet transfer to bedside commode with Contact Guard Assistance.  Upper extremity exercise program x10 reps per handout, with independence.                      Time Tracking:     OT Date of Treatment: 11/14/18  OT Start Time: 1330  OT Stop Time: 1349  OT Total Time (min): 19 min    Billable Minutes:Evaluation 19    Jennifer Rolon OT  11/14/2018

## 2018-11-14 NOTE — NURSING
In bed awake has no c/o pain at this time no distress noted call light in reach bed alarm set daughter at bedside will report and round with oncoming nurse.

## 2018-11-14 NOTE — PLAN OF CARE
Patient AAOX3 Daughter at bedside. VSS. Denies any pain at this time. Dr. Stephanie Chaudhari to release patient from PACU. Report to Agueda RN with time for questions, verbalized understanding. Patient discharged to floor RN in bed.

## 2018-11-14 NOTE — PT/OT/SLP PROGRESS
Occupational Therapy      Patient Name:  Adilia Rosas   MRN:  [id number]    Patient not seen at this time secondary to requesting pain medications prior to participation in OOB axs. Nsg notified  . Will follow-up as available.    Jennifer Rolon OT  11/14/2018

## 2018-11-14 NOTE — NURSING
In bed awake alert has c/o pain 8/10 to right hip medicated pt with scheduled pain pill as ordered demarcus well. Pt denies nausea consuming coffee demarcus well. No distress noted call light in reach bed alarm set daughter at bedside will report and round with oncoming nurse.

## 2018-11-14 NOTE — PLAN OF CARE
Problem: Fall Risk (Adult)  Goal: Identify Related Risk Factors and Signs and Symptoms  Related risk factors and signs and symptoms are identified upon initiation of Human Response Clinical Practice Guideline (CPG)  Outcome: Ongoing (interventions implemented as appropriate)  Bed alarm set frequent rounds made

## 2018-11-14 NOTE — PROGRESS NOTES
[facility] Nephrology Progress   Note  CC:   Patient is a 83 y.o. female who presents to to the ED as a transfer from [facility] 2/2 to lack of orthopedic coverage.. Patient stood up from her chair and fell on to her right side when attempting to take a step. Patient denies any dizziness or LOC prior to fall or any head trauma, Patient was found to have a right femoral neck fracture.        Subjective:   No complains   S/p hip surgery yesterday   Past Medical History:   Diagnosis Date    Arthritis     Elevated cholesterol     GERD (gastroesophageal reflux disease)     History of DVT (deep vein thrombosis) 2016    RUE    Hypertension       ?  Adilia  has a past medical history of Arthritis, Elevated cholesterol, GERD (gastroesophageal reflux disease), History of DVT (deep vein thrombosis) (2016), and Hypertension.  Adilia  has a past surgical history that includes Hysterectomy; Hernia repair; ORIF femur fracture (Left, 12/2016); HEMIARTHROPLASTY, HIP (Right, 11/13/2018); REMOVAL OF EXTERNAL FIXATION DEVICE (Left, 3/10/2017); APPLICATION-CAST LONG LEG (Left, 3/10/2017); REMOVAL-SUTURE (Left, 3/10/2017); IRRIGATION AND DEBRIDEMENT LOWER EXTREMITY (Left, 1/7/2017); PLACEMENT SPACER-ANTIBIOTIC (Left, 1/7/2017); IRRIGATION AND DEBRIDEMENT LOWER EXTREMITY; placement of antibiotic beads (Left, 12/27/2016); REMOVAL-HARDWARE-LEG (Left, 12/27/2016); APPLICATION-EXTERNAL FIXATION DEVICE; knee spanning (Left, 12/27/2016); PLACEMENT SPACER-ANTIBIOTIC (Left, 12/27/2016); IRRIGATION AND DEBRIDEMENT LOWER EXTREMITY; possible antibiotic bead placement; possible wound vac (Left, 12/26/2016); OPEN REDUCTION INTERNAL FIXATION-FEMUR (Left, 12/9/2016); LAPAROSCOPY-DIAGNOSTIC (N/A, 9/19/2016); EXPLORATORY-LAPAROTOMY, possible bowel resection (N/A, 9/19/2016); and LYSIS-ADHESION (N/A, 9/19/2016).  Her family history is not on file.  Adilia  reports that  has never smoked. She does not have any smokeless tobacco history on  "file. She reports that she does not drink alcohol or use drugs.  No current facility-administered medications on file prior to encounter.      Current Outpatient Medications on File Prior to Encounter   Medication Sig Dispense Refill    amlodipine (NORVASC) 10 MG tablet Take 10 mg by mouth every evening.      apixaban (ELIQUIS) 5 mg Tab Take 5 mg by mouth once daily.      atorvastatin (LIPITOR) 80 MG tablet Take 80 mg by mouth once daily.      hydrALAZINE (APRESOLINE) 50 MG tablet Take 50 mg by mouth 2 (two) times daily.      isosorbide mononitrate (IMDUR) 60 MG 24 hr tablet Take 60 mg by mouth once daily.      lisinopril (PRINIVIL,ZESTRIL) 5 MG tablet Take 5 mg by mouth once daily.      hydrocodone-acetaminophen 5-325mg (NORCO) 5-325 mg per tablet Take 2 tablets by mouth every 4 (four) hours as needed. 50 tablet 0     Aldonia is allergic to celebrex [celecoxib].    ?    Objective:   /65 (BP Location: Right arm, Patient Position: Lying)   Pulse 71   Temp 97.9 °F (36.6 °C) (Oral)   Resp 16   Ht 5' 4" (1.626 m)   Wt 67.9 kg (149 lb 11.1 oz)   LMP  (LMP Unknown)   SpO2 (!) 94%   Breastfeeding? No   BMI 25.69 kg/m²   Gen - NAD, A+Ox4, not confused  Gait - normal  HEENT/ NECK - EOMI/, NCAT, no JVD   CVS - RRR, no m/r/s3/s4  Resp - CTAB, no w/r/r  Abd - soft, NT, ND   Ext - no c/c/e  Psych - normal affect and behavior  Neuro - no asterixis  Laboratory:  Recent Results (from the past 24 hour(s))   Type & Screen    Collection Time: 11/13/18  1:23 PM   Result Value Ref Range    Group & Rh B POS     Indirect Ethan NEG    Brain natriuretic peptide    Collection Time: 11/13/18  1:23 PM   Result Value Ref Range     (H) 0 - 99 pg/mL   Troponin I    Collection Time: 11/13/18  1:23 PM   Result Value Ref Range    Troponin I 0.006 0.000 - 0.026 ng/mL   Comprehensive metabolic panel    Collection Time: 11/13/18  3:55 PM   Result Value Ref Range    Sodium 139 136 - 145 mmol/L    Potassium 5.4 (H) 3.5 - 5.1 " mmol/L    Chloride 112 (H) 95 - 110 mmol/L    CO2 18 (L) 23 - 29 mmol/L    Glucose 78 70 - 110 mg/dL    BUN, Bld 27 (H) 8 - 23 mg/dL    Creatinine 1.9 (H) 0.5 - 1.4 mg/dL    Calcium 10.1 8.7 - 10.5 mg/dL    Total Protein 7.0 6.0 - 8.4 g/dL    Albumin 3.2 (L) 3.5 - 5.2 g/dL    Total Bilirubin 0.7 0.1 - 1.0 mg/dL    Alkaline Phosphatase 86 55 - 135 U/L    AST 22 10 - 40 U/L    ALT 10 10 - 44 U/L    Anion Gap 9 8 - 16 mmol/L    eGFR if African American 28 (A) >60 mL/min/1.73 m^2    eGFR if non African American 24 (A) >60 mL/min/1.73 m^2   POCT glucose    Collection Time: 11/13/18  9:32 PM   Result Value Ref Range    POCT Glucose 101 70 - 110 mg/dL   CBC auto differential    Collection Time: 11/14/18  4:48 AM   Result Value Ref Range    WBC 7.14 3.90 - 12.70 K/uL    RBC 3.48 (L) 4.00 - 5.40 M/uL    Hemoglobin 10.2 (L) 12.0 - 16.0 g/dL    Hematocrit 33.1 (L) 37.0 - 48.5 %    MCV 95 82 - 98 fL    MCH 29.3 27.0 - 31.0 pg    MCHC 30.8 (L) 32.0 - 36.0 g/dL    RDW 14.7 (H) 11.5 - 14.5 %    Platelets 122 (L) 150 - 350 K/uL    MPV 11.9 9.2 - 12.9 fL    Gran # (ANC) 5.8 1.8 - 7.7 K/uL    Lymph # 0.7 (L) 1.0 - 4.8 K/uL    Mono # 0.4 0.3 - 1.0 K/uL    Eos # 0.3 0.0 - 0.5 K/uL    Baso # 0.01 0.00 - 0.20 K/uL    Gran% 81.0 (H) 38.0 - 73.0 %    Lymph% 9.8 (L) 18.0 - 48.0 %    Mono% 5.2 4.0 - 15.0 %    Eosinophil% 3.6 0.0 - 8.0 %    Basophil% 0.1 0.0 - 1.9 %    Differential Method Automated    Comprehensive Metabolic Panel (CMP)    Collection Time: 11/14/18  4:48 AM   Result Value Ref Range    Sodium 138 136 - 145 mmol/L    Potassium 5.2 (H) 3.5 - 5.1 mmol/L    Chloride 112 (H) 95 - 110 mmol/L    CO2 17 (L) 23 - 29 mmol/L    Glucose 78 70 - 110 mg/dL    BUN, Bld 26 (H) 8 - 23 mg/dL    Creatinine 2.0 (H) 0.5 - 1.4 mg/dL    Calcium 9.3 8.7 - 10.5 mg/dL    Total Protein 5.9 (L) 6.0 - 8.4 g/dL    Albumin 2.9 (L) 3.5 - 5.2 g/dL    Total Bilirubin 0.4 0.1 - 1.0 mg/dL    Alkaline Phosphatase 74 55 - 135 U/L    AST 27 10 - 40 U/L    ALT 10 10  - 44 U/L    Anion Gap 9 8 - 16 mmol/L    eGFR if African American 26 (A) >60 mL/min/1.73 m^2    eGFR if non African American 23 (A) >60 mL/min/1.73 m^2   Magnesium    Collection Time: 11/14/18  4:48 AM   Result Value Ref Range    Magnesium 1.4 (L) 1.6 - 2.6 mg/dL   Phosphorus    Collection Time: 11/14/18  4:48 AM   Result Value Ref Range    Phosphorus 3.2 2.7 - 4.5 mg/dL   POCT glucose    Collection Time: 11/14/18  6:13 AM   Result Value Ref Range    POCT Glucose 85 70 - 110 mg/dL   POCT glucose    Collection Time: 11/14/18 11:25 AM   Result Value Ref Range    POCT Glucose 87 70 - 110 mg/dL     Recent Labs   Lab 11/12/18  1342 11/13/18  0440 11/14/18  0448   WBC 8.73 5.81 7.14   HGB 10.8* 11.1* 10.2*   HCT 35.4* 36.6* 33.1*    161 122*   MCV 96 97 95     Recent Labs   Lab 11/13/18  0440 11/13/18  1555 11/14/18  0448    139 138   K 5.5* 5.4* 5.2*   * 112* 112*   CO2 17* 18* 17*   BUN 27* 27* 26*   GLU 84 78 78   CALCIUM 9.8 10.1 9.3   MG 1.4*  --  1.4*   PHOS 2.7  --  3.2     Recent Labs   Lab 11/13/18  0440 11/13/18  1555 11/14/18  0448   PROT 6.5 7.0 5.9*   ALBUMIN 3.1* 3.2* 2.9*   BILITOT 0.6 0.7 0.4   AST 19 22 27   ALT 11 10 10   ALKPHOS 78 86 74     Recent Labs   Lab 11/12/18  1342 11/13/18  1018   INR 1.1 1.1     Cardiac:   Recent Labs   Lab 11/13/18  1005 11/13/18  1018 11/13/18  1323   TROPONINI 0.023 0.014 0.006   BNP  --   --  717*     FLP:   Lab Results   Component Value Date    CHOL 129 11/29/2006    HDL 38.0 (L) 11/29/2006    LDLCALC 72.4 11/29/2006    TRIG 93 11/29/2006    CHOLHDL 29.5 11/29/2006     DM:   Lab Results   Component Value Date    HGBA1C 4.8 11/12/2018    LDLCALC 72.4 11/29/2006    CREATININE 2.0 (H) 11/14/2018     Thyroid:   Lab Results   Component Value Date    TSH 0.983 11/12/2018     Anemia: No results found for: IRON, TIBC, FERRITIN, [id number], FOLATE  Urinalysis:   Lab Results   Component Value Date    LABURIN No growth 01/13/2017    COLORU Yellow 11/12/2018     SPECGRAV 1.010 11/12/2018    NITRITE Negative 11/12/2018    KETONESU Negative 11/12/2018    UROBILINOGEN Negative 11/12/2018     Assessment:   83 to f with pmh of HFpEF, HTN, CKD 4. Patient stood up from her chair and fell on to her right side when attempting to take a step.    RIGHT HEP FRACTURE   CKD 3-4 GFR 36-30 AT BASELINE possible 2/2 htn   - ua trace protein / blood trace    HTN   HFpEF   Plan:   Renal function stable   Protein creatinine ration   [facility]   Renally dose all meds   Sing off     Ruiz Vasques  [employer] Nephrology PGY5    ?  ?

## 2018-11-14 NOTE — PT/OT/SLP PROGRESS
Occupational Therapy      Patient Name:  Adilia Rosas   MRN:  [id number]    Patient not seen today secondary to pt requesting until after 12pm to be seen when scheduled pain medications can be administered. Declining other PRN medications at this time  . Will follow-up as available .    Jennifer Rolon OT  11/14/2018

## 2018-11-14 NOTE — PROGRESS NOTES
Interval:   The patient is resting in bed, pain controlled and daughter at bedside. No acute events reported over night.    Vitals:  Temp:  [94.3 °F (34.6 °C)-98.3 °F (36.8 °C)] 97.9 °F (36.6 °C)  Pulse:  [] 82  Resp:  [12-50] 17  SpO2:  [90 %-97 %] 96 %  BP: (112-185)/(60-86) 119/65    Scheduled Meds:    amLODIPine  10 mg Oral QHS    ascorbic acid (vitamin C)  500 mg Oral Daily    atorvastatin  80 mg Oral Daily    benazepril  20 mg Oral Daily    enoxaparin  30 mg Subcutaneous Daily    ferrous gluconate  324 mg Oral Daily with breakfast    HYDROcodone-acetaminophen  1 tablet Oral Q6H    lubiprostone  24 mcg Oral BID WM    magnesium oxide  400 mg Oral BID    metoprolol tartrate  50 mg Oral BID    mirtazapine  15 mg Oral QHS    pantoprazole  40 mg Oral BID    senna-docusate 8.6-50 mg  1 tablet Oral Daily    sodium bicarbonate  1,300 mg Oral BID    vitamin D  2,000 Units Oral Daily     Continuous Infusions:   PRN Meds: albuterol, albuterol sulfate, bisacodyl, dextrose 50%, dextrose 50%, glucagon (human recombinant), glucose, glucose, morphine, ondansetron, sodium chloride 0.9%    Diet: Diet Cardiac    Trended Lab Data:  Recent Labs   Lab 11/12/18  1342 11/13/18  0440 11/13/18  1555 11/14/18  0448   WBC 8.73 5.81  --  7.14   HGB 10.8* 11.1*  --  10.2*   HCT 35.4* 36.6*  --  33.1*    161  --  122*   MCV 96 97  --  95   RDW 14.9* 14.8*  --  14.7*    139 139 138   K 5.0 5.5* 5.4* 5.2*   * 115* 112* 112*   CO2 17* 17* 18* 17*   BUN 26* 27* 27* 26*   CREATININE 2.0* 1.8* 1.9* 2.0*   GLU 81 84 78 78   PROT 7.1 6.5 7.0 5.9*   ALBUMIN 3.4* 3.1* 3.2* 2.9*   BILITOT 0.6 0.6 0.7 0.4   AST 23 19 22 27   ALKPHOS 88 78 86 74   ALT 12 11 10 10       I/O last 3 completed shifts:  In: 1190 [P.O.:240; I.V.:850; IV Piggyback:100]  Out: 4825 [Urine:4625; Blood:200]    Exam:  CV: 2+ RP   Pulm: no increased WOB  NAD, alert, awake, Ox3     Dressing clean dry and intact  5/5 ehl/fhl/ta/gs  [place]  sa/finley/dp/sp/t  Wwp, bcr    Xray demonstrates well placed right hip hemiarthroplasty  Impression:  84 y/o F who underwent right hip hemiarthroplasty on 11/13/2018 and tolerated it well    Plan:  Perioperative ABX to be completed today  H/H stable  PT to eval and treat and assist OOB  DVT/VTE prophylaxis: lovenox daily  Weight Bearing Status: as tolerted    Dispo: pending PT recs    I agree with findings outlined by the resident.

## 2018-11-15 LAB
ALBUMIN SERPL BCP-MCNC: 2.4 G/DL
ALP SERPL-CCNC: 68 U/L
ALT SERPL W/O P-5'-P-CCNC: <5 U/L
ANION GAP SERPL CALC-SCNC: 6 MMOL/L
AST SERPL-CCNC: 23 U/L
BASOPHILS # BLD AUTO: 0 K/UL
BASOPHILS NFR BLD: 0 %
BILIRUB SERPL-MCNC: 0.4 MG/DL
BNP SERPL-MCNC: 189 PG/ML
BUN SERPL-MCNC: 31 MG/DL
CALCIUM SERPL-MCNC: 8.6 MG/DL
CHLORIDE SERPL-SCNC: 108 MMOL/L
CO2 SERPL-SCNC: 22 MMOL/L
CREAT SERPL-MCNC: 2.3 MG/DL
CREAT UR-MCNC: 105.5 MG/DL
DIFFERENTIAL METHOD: ABNORMAL
EOSINOPHIL # BLD AUTO: 0.2 K/UL
EOSINOPHIL NFR BLD: 2.9 %
ERYTHROCYTE [DISTWIDTH] IN BLOOD BY AUTOMATED COUNT: 14.8 %
EST. GFR  (AFRICAN AMERICAN): 22 ML/MIN/1.73 M^2
EST. GFR  (NON AFRICAN AMERICAN): 19 ML/MIN/1.73 M^2
GLUCOSE SERPL-MCNC: 84 MG/DL
HCT VFR BLD AUTO: 27.4 %
HGB BLD-MCNC: 8.4 G/DL
LYMPHOCYTES # BLD AUTO: 0.9 K/UL
LYMPHOCYTES NFR BLD: 12.9 %
MAGNESIUM SERPL-MCNC: 1.3 MG/DL
MCH RBC QN AUTO: 29 PG
MCHC RBC AUTO-ENTMCNC: 30.7 G/DL
MCV RBC AUTO: 95 FL
MONOCYTES # BLD AUTO: 0.9 K/UL
MONOCYTES NFR BLD: 12.8 %
NEUTROPHILS # BLD AUTO: 5.1 K/UL
NEUTROPHILS NFR BLD: 71.3 %
PHOSPHATE SERPL-MCNC: 2.6 MG/DL
PLATELET # BLD AUTO: 118 K/UL
PMV BLD AUTO: 11.2 FL
POCT GLUCOSE: 108 MG/DL (ref 70–110)
POCT GLUCOSE: 109 MG/DL (ref 70–110)
POCT GLUCOSE: 109 MG/DL (ref 70–110)
POCT GLUCOSE: 85 MG/DL (ref 70–110)
POTASSIUM SERPL-SCNC: 5.2 MMOL/L
PROT SERPL-MCNC: 5.2 G/DL
PROT UR-MCNC: 68 MG/DL
PROT/CREAT UR: 0.64 MG/G{CREAT}
RBC # BLD AUTO: 2.9 M/UL
SODIUM SERPL-SCNC: 136 MMOL/L
WBC # BLD AUTO: 7.12 K/UL

## 2018-11-15 PROCEDURE — 25000003 PHARM REV CODE 250: Performed by: STUDENT IN AN ORGANIZED HEALTH CARE EDUCATION/TRAINING PROGRAM

## 2018-11-15 PROCEDURE — 25000003 PHARM REV CODE 250: Performed by: FAMILY MEDICINE

## 2018-11-15 PROCEDURE — 80053 COMPREHEN METABOLIC PANEL: CPT

## 2018-11-15 PROCEDURE — 83735 ASSAY OF MAGNESIUM: CPT

## 2018-11-15 PROCEDURE — 94761 N-INVAS EAR/PLS OXIMETRY MLT: CPT

## 2018-11-15 PROCEDURE — 11000001 HC ACUTE MED/SURG PRIVATE ROOM

## 2018-11-15 PROCEDURE — 25000003 PHARM REV CODE 250: Performed by: INTERNAL MEDICINE

## 2018-11-15 PROCEDURE — 27000221 HC OXYGEN, UP TO 24 HOURS

## 2018-11-15 PROCEDURE — 85025 COMPLETE CBC W/AUTO DIFF WBC: CPT

## 2018-11-15 PROCEDURE — 94799 UNLISTED PULMONARY SVC/PX: CPT

## 2018-11-15 PROCEDURE — 86580 TB INTRADERMAL TEST: CPT | Performed by: FAMILY MEDICINE

## 2018-11-15 PROCEDURE — 97535 SELF CARE MNGMENT TRAINING: CPT

## 2018-11-15 PROCEDURE — 97116 GAIT TRAINING THERAPY: CPT

## 2018-11-15 PROCEDURE — 63600175 PHARM REV CODE 636 W HCPCS: Performed by: FAMILY MEDICINE

## 2018-11-15 PROCEDURE — 99232 SBSQ HOSP IP/OBS MODERATE 35: CPT | Mod: ,,, | Performed by: NURSE PRACTITIONER

## 2018-11-15 PROCEDURE — 84100 ASSAY OF PHOSPHORUS: CPT

## 2018-11-15 PROCEDURE — 83880 ASSAY OF NATRIURETIC PEPTIDE: CPT

## 2018-11-15 PROCEDURE — 36415 COLL VENOUS BLD VENIPUNCTURE: CPT

## 2018-11-15 PROCEDURE — 82570 ASSAY OF URINE CREATININE: CPT

## 2018-11-15 PROCEDURE — 97110 THERAPEUTIC EXERCISES: CPT

## 2018-11-15 RX ORDER — LANOLIN ALCOHOL/MO/W.PET/CERES
400 CREAM (GRAM) TOPICAL
Status: DISPENSED | OUTPATIENT
Start: 2018-11-15 | End: 2018-11-16

## 2018-11-15 RX ORDER — FUROSEMIDE 10 MG/ML
20 INJECTION INTRAMUSCULAR; INTRAVENOUS ONCE
Status: DISCONTINUED | OUTPATIENT
Start: 2018-11-15 | End: 2018-11-15

## 2018-11-15 RX ORDER — SODIUM,POTASSIUM PHOSPHATES 280-250MG
2 POWDER IN PACKET (EA) ORAL
Status: COMPLETED | OUTPATIENT
Start: 2018-11-15 | End: 2018-11-15

## 2018-11-15 RX ORDER — FUROSEMIDE 10 MG/ML
20 INJECTION INTRAMUSCULAR; INTRAVENOUS ONCE
Status: DISCONTINUED | OUTPATIENT
Start: 2018-11-15 | End: 2018-11-19 | Stop reason: HOSPADM

## 2018-11-15 RX ADMIN — HYDROCODONE BITARTRATE AND ACETAMINOPHEN 1 TABLET: 10; 325 TABLET ORAL at 12:11

## 2018-11-15 RX ADMIN — TUBERCULIN PURIFIED PROTEIN DERIVATIVE 5 UNITS: 5 INJECTION INTRADERMAL at 02:11

## 2018-11-15 RX ADMIN — PANTOPRAZOLE SODIUM 40 MG: 40 TABLET, DELAYED RELEASE ORAL at 09:11

## 2018-11-15 RX ADMIN — MAGNESIUM OXIDE TAB 400 MG (241.3 MG ELEMENTAL MG) 400 MG: 400 (241.3 MG) TAB at 09:11

## 2018-11-15 RX ADMIN — SODIUM BICARBONATE 650 MG TABLET 1300 MG: at 09:11

## 2018-11-15 RX ADMIN — LUBIPROSTONE 24 MCG: 24 CAPSULE, GELATIN COATED ORAL at 09:11

## 2018-11-15 RX ADMIN — OXYCODONE HYDROCHLORIDE AND ACETAMINOPHEN 500 MG: 500 TABLET ORAL at 09:11

## 2018-11-15 RX ADMIN — POTASSIUM & SODIUM PHOSPHATES POWDER PACK 280-160-250 MG 2 PACKET: 280-160-250 PACK at 05:11

## 2018-11-15 RX ADMIN — POTASSIUM & SODIUM PHOSPHATES POWDER PACK 280-160-250 MG 2 PACKET: 280-160-250 PACK at 11:11

## 2018-11-15 RX ADMIN — HYDROCODONE BITARTRATE AND ACETAMINOPHEN 1 TABLET: 10; 325 TABLET ORAL at 02:11

## 2018-11-15 RX ADMIN — MAGNESIUM OXIDE TAB 400 MG (241.3 MG ELEMENTAL MG) 400 MG: 400 (241.3 MG) TAB at 02:11

## 2018-11-15 RX ADMIN — SODIUM CHLORIDE 250 ML: 0.9 INJECTION, SOLUTION INTRAVENOUS at 04:11

## 2018-11-15 RX ADMIN — LUBIPROSTONE 24 MCG: 24 CAPSULE, GELATIN COATED ORAL at 05:11

## 2018-11-15 RX ADMIN — FERROUS GLUCONATE 324 MG: 324 TABLET ORAL at 09:11

## 2018-11-15 RX ADMIN — ENOXAPARIN SODIUM 30 MG: 100 INJECTION SUBCUTANEOUS at 05:11

## 2018-11-15 RX ADMIN — STANDARDIZED SENNA CONCENTRATE AND DOCUSATE SODIUM 1 TABLET: 8.6; 5 TABLET, FILM COATED ORAL at 09:11

## 2018-11-15 RX ADMIN — POTASSIUM & SODIUM PHOSPHATES POWDER PACK 280-160-250 MG 2 PACKET: 280-160-250 PACK at 02:11

## 2018-11-15 RX ADMIN — MAGNESIUM OXIDE TAB 400 MG (241.3 MG ELEMENTAL MG) 400 MG: 400 (241.3 MG) TAB at 05:11

## 2018-11-15 RX ADMIN — VITAMIN D, TAB 1000IU (100/BT) 2000 UNITS: 25 TAB at 09:11

## 2018-11-15 RX ADMIN — HYDROCODONE BITARTRATE AND ACETAMINOPHEN 1 TABLET: 10; 325 TABLET ORAL at 06:11

## 2018-11-15 RX ADMIN — ATORVASTATIN CALCIUM 80 MG: 40 TABLET, FILM COATED ORAL at 09:11

## 2018-11-15 NOTE — PLAN OF CARE
Problem: Occupational Therapy Goal  Goal: Occupational Therapy Goal  Goals to be met by: 12/14/18     Patient will increase functional independence with ADLs by performing:    Grooming while standing with Contact Guard Assistance.  Toileting from bedside commode with Minimal Assistance for hygiene and clothing management.   Supine to sit with Minimal Assistance.  Step transfer with Contact Guard Assistance  Toilet transfer to bedside commode with Contact Guard Assistance.  Upper extremity exercise program x10 reps per handout, with independence.     Outcome: Ongoing (interventions implemented as appropriate)  Pt progressing well towards goals this date. Short distance ambulation performed x 2 trials as well as toileting from BS. Cont OT POC. SNF at d/c

## 2018-11-15 NOTE — CARE UPDATE
The patient has bp 116/61 and had a map above 65 overnight. Patient's daughter a nurse feels she looks great and has been working with pt/ot without any problem. I told her for her lower leg edema and some fluid in the lung, she would benefit from lasix 20mg to help with the fluid and kidney function. The patient and patient's family expressed concerns and wanted to wait till bp is higher.  I told her we're hold bp meds for now and will resume this evening if bp is high. Pt and daughter expressed appreciation and understanding. Concern is noted.

## 2018-11-15 NOTE — PT/OT/SLP PROGRESS
Physical Therapy Treatment    Patient Name:  Adilia Rosas   MRN:  [id number]    Recommendations:     Discharge Recommendations:  nursing facility, skilled   Discharge Equipment Recommendations:     Barriers to discharge: decreased mobility,endurance and strength    Assessment:     Adilia Rosas is a 83 y.o. female admitted with a medical diagnosis of Fracture of femur.  She presents with the following impairments/functional limitations:  weakness, impaired endurance, impaired functional mobilty, gait instability, impaired balance, decreased upper extremity function, decreased lower extremity function, decreased ROM, impaired coordination, impaired skin, orthopedic precautions, edema ,pt with good participation and requires assistance with all mobility,pt remains with decreased strength and endurance and will benefit from SNF upon discharge.    Rehab Prognosis:  Good; patient would benefit from acute skilled PT services to address these deficits and reach maximum level of function.      Recent Surgery: Procedure(s) (LRB):  HEMIARTHROPLASTY, HIP (Right) 2 Days Post-Op    Plan:     During this hospitalization, patient to be seen BID to address the above listed problems via gait training, therapeutic activities, therapeutic exercises  · Plan of Care Expires:  12/14/18   Plan of Care Reviewed with: patient, daughter    Subjective     Communicated with nsg prior to session.  Patient found supine upon PT entry to room, agreeable to treatment.      Chief Complaint: n/a  Patient comments/goals: pt pleased with todays progress  Pain/Comfort:  · Pain Rating 1: (no c/o's)    Patients cultural, spiritual, Oriental orthodox conflicts given the current situation:      Objective:     Patient found with: PureWick, oxygen     General Precautions: Standard, fall   Orthopedic Precautions:RLE weight bearing as tolerated   Braces:       Functional Mobility:  · Bed Mobility:     · Supine to Sit: minimum assistance  · Sit to Supine: maximal  assistance and at le's  · Transfers:     · Sit to Stand:  moderate assistance and of 2 persons with rolling walker  · Toilet Transfer: minimum assistance with  rolling walker  using  Step Transfer  · Gait: amb ~10' X 1 with RW and Min A X 1 and SBA X 1 with v/c's  · Balance: fair standing balance with RW      AM-PAC 6 CLICK MOBILITY  Turning over in bed (including adjusting bedclothes, sheets and blankets)?: 3  Sitting down on and standing up from a chair with arms (e.g., wheelchair, bedside commode, etc.): 2  Moving from lying on back to sitting on the side of the bed?: 3  Moving to and from a bed to a chair (including a wheelchair)?: 3  Need to walk in hospital room?: 3  Climbing 3-5 steps with a railing?: 1  Basic Mobility Total Score: 15       Therapeutic Activities and Exercises:       Patient left supine with all lines intact, call button in reach, bed alarm on, nsg notified and daughter present..    GOALS: see general POC  Multidisciplinary Problems     Physical Therapy Goals        Problem: Physical Therapy Goal    Goal Priority Disciplines Outcome Goal Variances Interventions   Physical Therapy Goal     PT, PT/OT Ongoing (interventions implemented as appropriate)     Description:  Goals to be met by: 2018     Patient will increase functional independence with mobility by performin. Supine to sit with Modified [place]  2. Sit to stand transfer with Stand-by Assistance  3. Bed to chair transfer with Stand-by Assistance using Rolling Walker  4. Gait  x 50 feet with Stand-by Assistance using Rolling Walker.                       Time Tracking:     PT Received On: 11/15/18  PT Start Time: 942     PT Stop Time: 1015  PT Total Time (min): 33 min     Billable Minutes: Gait Training 16 and Total Time 33    Treatment Type: Treatment  PT/PTA: PTA     PTA Visit Number: 1     Zac Woodson, PTA  11/15/2018

## 2018-11-15 NOTE — PROGRESS NOTES
Interval:   The patient is resting in bed, pain controlled, daughter at bedside. No acute events reported over night. Pt was OOB with PT last evening.    Vitals:  Temp:  [97.9 °F (36.6 °C)-99.4 °F (37.4 °C)] 98.5 °F (36.9 °C)  Pulse:  [61-88] 71  Resp:  [16-18] 17  SpO2:  [92 %-96 %] 95 %  BP: ()/(52-65) 102/60    Scheduled Meds:    amLODIPine  10 mg Oral QHS    ascorbic acid (vitamin C)  500 mg Oral Daily    atorvastatin  80 mg Oral Daily    benazepril  20 mg Oral Daily    enoxaparin  30 mg Subcutaneous Daily    ferrous gluconate  324 mg Oral Daily with breakfast    HYDROcodone-acetaminophen  1 tablet Oral Q6H    lubiprostone  24 mcg Oral BID WM    magnesium oxide  400 mg Oral BID    metoprolol tartrate  50 mg Oral BID    mirtazapine  15 mg Oral QHS    pantoprazole  40 mg Oral BID    senna-docusate 8.6-50 mg  1 tablet Oral Daily    sodium bicarbonate  1,300 mg Oral BID    vitamin D  2,000 Units Oral Daily     Continuous Infusions:   PRN Meds: albuterol, albuterol sulfate, bisacodyl, dextrose 50%, dextrose 50%, glucagon (human recombinant), glucose, glucose, morphine, ondansetron, sodium chloride 0.9%    Diet: Diet Cardiac    Trended Lab Data:  Recent Labs   Lab 11/13/18  0440 11/13/18  1555 11/14/18  0448 11/15/18  0522   WBC 5.81  --  7.14 7.12   HGB 11.1*  --  10.2* 8.4*   HCT 36.6*  --  33.1* 27.4*     --  122* 118*   MCV 97  --  95 95   RDW 14.8*  --  14.7* 14.8*    139 138 136   K 5.5* 5.4* 5.2* 5.2*   * 112* 112* 108   CO2 17* 18* 17* 22*   BUN 27* 27* 26* 31*   CREATININE 1.8* 1.9* 2.0* 2.3*   GLU 84 78 78 84   PROT 6.5 7.0 5.9* 5.2*   ALBUMIN 3.1* 3.2* 2.9* 2.4*   BILITOT 0.6 0.7 0.4 0.4   AST 19 22 27 23   ALKPHOS 78 86 74 68   ALT 11 10 10 <5*       I/O last 3 completed shifts:  In: 1530 [P.O.:580; I.V.:850; IV Piggyback:100]  Out: 4025 [Urine:3825; Blood:200]    Exam:  CV: 2+ RP   Pulm: no increased WOB  NAD, alert, awake, Ox3     Dressing clean dry and intact  5/5  ehl/fhl/ta/gs  [place] sa/finley/dp/sp/t  Wwp, bcr    Impression:  82 y/o F who underwent right hip hemiarthroplasty on 11/13/2018    Plan:  PT recs for SNF, primary team to set up.  Patient to follow up with Dr. Franklin in 2 weeks  H/H decreased post op to 8.4/27.4, continue to monitor.  PT to continue working with someone  DVT/VTE prophylaxis: lovenox daily  Weight Bearing Status: as tolerted    Dispo: pending PT recs    I agree with findings outlined by the resident.

## 2018-11-15 NOTE — ASSESSMENT & PLAN NOTE
-recent echo at [facility] with EF down to 30-35% with repeat echo  with EF 35-40% with WMA  -noted JVD and peripheral edema upon admission with IVF infusing; given IV Lasix  with 3.5 liters out and negative 3.5 liters since admission   -JVD improved; appears euvolemic on exam this AM; noted SBP down to 90s overnight with NS boluses   -will continue with close monitoring of fluid volume status with strict I&Os and daily weigths  -short 3 beat run of NSVT noted on telemetry overnight; likely stress induced given anemia and recent surgery; also concerning given newly depressed LVEF with WMA; will continue CCB, BB and ACEI for now; if NSVT increases in frequency or becomes longer in duration then would recommend up titration of BB with PRBC transfusion   -proceed with ischemic evaluation after hip surgery recovery

## 2018-11-15 NOTE — PLAN OF CARE
Patient medically accepted for skilled level of care to [facility].  spoke to Robby in admissions. She can take patient today.     requested attending team to place facility orders.

## 2018-11-15 NOTE — ANESTHESIA POSTPROCEDURE EVALUATION
"Anesthesia Post Evaluation    Patient: Aidlia Rosas    Procedure(s) Performed: Procedure(s) (LRB):  HEMIARTHROPLASTY, HIP (Right)    Final Anesthesia Type: general  Patient location during evaluation: PACU  Patient participation: Yes- Able to Participate  Level of consciousness: awake and alert  Post-procedure vital signs: reviewed and stable  Pain management: adequate  Airway patency: patent  PONV status at discharge: No PONV  Anesthetic complications: no      Cardiovascular status: blood pressure returned to baseline  Respiratory status: unassisted  Hydration status: euvolemic          Visit Vitals  /60 (BP Location: Right arm)   Pulse 71   Temp 36.9 °C (98.5 °F) (Oral)   Resp 17   Ht 5' 4" (1.626 m)   Wt 67.9 kg (149 lb 11.1 oz)   LMP  (LMP Unknown)   SpO2 95%   Breastfeeding? No   BMI 25.69 kg/m²       Pain/Kristie Score: Pain Assessment Performed: Yes (11/15/2018  3:01 AM)  Presence of Pain: complains of pain/discomfort (11/15/2018  3:01 AM)  Pain Rating Prior to Med Admin: 7 (11/15/2018 12:32 AM)  Pain Rating Post Med Admin: 0 (11/14/2018  6:37 AM)    Released per Dr. Brown    "

## 2018-11-15 NOTE — PROGRESS NOTES
[facility]  Cardiology  Progress Note    Patient Name: Adilia Rosas  MRN: [id number]  Admission Date: 11/12/2018  Hospital Length of Stay: 3 days  Code Status: Full Code   Attending Physician: Oksana Sow MD   Primary Care Physician: Ely Dubois MD  Expected Discharge Date:   Principal Problem:Fracture of femur    Subjective:     Hospital Course:   11/12/2018 Presented to the ER at [facility] following fall. Xray with evidence of femur fracture and transferred to [facility] for orthopedic evaluation. Admitted to [facility] and placed on telemetry on 5th floor. Echocardiogram with moderately depressed LV function with EF 35-40%, grade I diastolic dysfunction, mild AI/MR/TR and WMA (akines to mid inferior wall and mid posterior wall). Previous echo at [facility] 10/15/2018 with LVEF 30-35% and no mention of WMA. BP elevated at 160s-180s/90s upon admission ?pain related  11/13/2018 K+ 5.5 creatinine 1.8  (baseline 1.6-1.9). BP down to 130s/70s HR 50s-60s with no arrhythmias noted on telemetry overnight. Troponin .023 and BNP pending. Cardiology consulted for preoperative clearance   11/14/2018 Underwent right hip surgery yesterday. Currently reports pain controlled with pain meds. No complaints of chest pain or SOB overnight. HR and BP stable. No arrhythmias noted on telemetry overnight. V Lasix yesterday with 3.5 liters out and negative 3.6 liters since admission-JVD improved. K+ 5.2 with BUN 26 creatinine 2.0 H&H stable at baseline   11/15/2018 PT on board and OOB with ambulation this AM. HR and BP stable this AM. Noted SBP down to 90s yesterday evening with NS bolus given. H&H down to 8.4 & 27.4 this AM from 10.2 & 33.1 yesterday. Monitored on telemetry with short 3 beat run of NSVT noted. Patient without complaints. Continue BB therapy and continue to monitor on telemetry         Review of Systems   Cardiovascular: Negative for chest pain, claudication, cyanosis, dyspnea on exertion,  irregular heartbeat, leg swelling, near-syncope, orthopnea, palpitations, paroxysmal nocturnal dyspnea and syncope.   Respiratory: Negative for cough, shortness of breath and wheezing.      Objective:     Vital Signs (Most Recent):  Temp: 98.4 °F (36.9 °C) (11/15/18 1121)  Pulse: 76 (11/15/18 1206)  Resp: 17 (11/15/18 1206)  BP: (!) 91/52 (11/15/18 1121)  SpO2: 98 % (11/15/18 1206) Vital Signs (24h Range):  Temp:  [98.2 °F (36.8 °C)-99.4 °F (37.4 °C)] 98.4 °F (36.9 °C)  Pulse:  [64-88] 76  Resp:  [16-20] 17  SpO2:  [92 %-98 %] 98 %  BP: ()/(52-64) 91/52     Weight: 67.9 kg (149 lb 11.1 oz)  Body mass index is 25.69 kg/m².     SpO2: 98 %  O2 Device (Oxygen Therapy): nasal cannula      Intake/Output Summary (Last 24 hours) at 11/15/2018 1254  Last data filed at 11/15/2018 0436  Gross per 24 hour   Intake 470 ml   Output 550 ml   Net -80 ml       Lines/Drains/Airways     Peripherally Inserted Central Catheter Line                 PICC Double Lumen 12/28/16 1630 right basilic 686 days          Drain            Female External Urinary Catheter 11/14/18 1325 less than 1 day          Peripheral Intravenous Line                 Peripheral IV - Single Lumen 03/10/17 0655 Left Wrist 615 days         Peripheral IV - Single Lumen 11/13/18 1335 Left Antecubital 1 day                Physical Exam   Constitutional: She is oriented to person, place, and time. She appears well-developed and well-nourished. No distress.   Cardiovascular: Normal rate and regular rhythm. Exam reveals no gallop.   No murmur heard.  Pulmonary/Chest: Effort normal and breath sounds normal. No respiratory distress. She has no wheezes.   Abdominal: Soft. Bowel sounds are normal. She exhibits no distension. There is no tenderness.   Neurological: She is alert and oriented to person, place, and time.   Skin: Skin is warm and dry.       Significant Labs:     Recent Labs   Lab 11/15/18  0522      K 5.2*      CO2 22*   BUN 31*   CREATININE 2.3*    MG 1.3*     Recent Labs   Lab 11/15/18  0522   WBC 7.12   RBC 2.90*   HGB 8.4*   HCT 27.4*   *   MCV 95   MCH 29.0   MCHC 30.7*           Assessment and Plan:     Brief HPI: Seen on rounds with daughter at the bedside. Denies any complaints of chest pain or SOB currently. Reviewed cardiac POC with patient and daughter as detailed below-both verbalized understanding and agree with POC     Femoral neck fracture    -managed by Orthopedics      Acute on chronic combined systolic and diastolic congestive heart failure, NYHA class 4    -recent echo at [facility] with EF down to 30-35% with repeat echo  with EF 35-40% with WMA  -noted JVD and peripheral edema upon admission with IVF infusing; given IV Lasix  with 3.5 liters out and negative 3.5 liters since admission   -JVD improved; appears euvolemic on exam this AM; noted SBP down to 90s overnight with NS boluses   -will continue with close monitoring of fluid volume status with strict I&Os and daily weigths  -short 3 beat run of NSVT noted on telemetry overnight; likely stress induced given anemia and recent surgery; also concerning given newly depressed LVEF with WMA; will continue CCB, BB and ACEI for now; if NSVT increases in frequency or becomes longer in duration then would recommend up titration of BB with PRBC transfusion   -proceed with ischemic evaluation after hip surgery recovery      Chronic kidney disease (CKD) stage G3b/A1, moderately decreased glomerular filtration rate (GFR) between 30-44 mL/min/1.73 square meter and albuminuria creatinine ratio less than 30 mg/g    -creatinine 2.3 this AM up from 2.0 yesterday  -baseline creatinine 1.6-1.9       Essential hypertension    -BP stable this AM; noted reports of SBP down to 90s overnight with NS boluses given  -continue CCB, BB and ACEI for now  -monitor BP; if H&H continues to trend down and recurrent hypotension occurs then would hold antihypertensive regimen and recommend PRBC transfusion             VTE Risk Mitigation (From admission, onward)        Ordered     enoxaparin injection 30 mg  Daily      11/13/18 0649     IP VTE HIGH RISK PATIENT  Once      11/12/18 1723     Place sequential compression device  Until discontinued      11/12/18 1723          BETHANY Cyr, ANP  Cardiology  [facility]

## 2018-11-15 NOTE — SUBJECTIVE & OBJECTIVE
Review of Systems   Cardiovascular: Negative for chest pain, claudication, cyanosis, dyspnea on exertion, irregular heartbeat, leg swelling, near-syncope, orthopnea, palpitations, paroxysmal nocturnal dyspnea and syncope.   Respiratory: Negative for cough, shortness of breath and wheezing.      Objective:     Vital Signs (Most Recent):  Temp: 98.4 °F (36.9 °C) (11/15/18 1121)  Pulse: 76 (11/15/18 1206)  Resp: 17 (11/15/18 1206)  BP: (!) 91/52 (11/15/18 1121)  SpO2: 98 % (11/15/18 1206) Vital Signs (24h Range):  Temp:  [98.2 °F (36.8 °C)-99.4 °F (37.4 °C)] 98.4 °F (36.9 °C)  Pulse:  [64-88] 76  Resp:  [16-20] 17  SpO2:  [92 %-98 %] 98 %  BP: ()/(52-64) 91/52     Weight: 67.9 kg (149 lb 11.1 oz)  Body mass index is 25.69 kg/m².     SpO2: 98 %  O2 Device (Oxygen Therapy): nasal cannula      Intake/Output Summary (Last 24 hours) at 11/15/2018 1254  Last data filed at 11/15/2018 0436  Gross per 24 hour   Intake 470 ml   Output 550 ml   Net -80 ml       Lines/Drains/Airways     Peripherally Inserted Central Catheter Line                 PICC Double Lumen 12/28/16 1630 right basilic 686 days          Drain            Female External Urinary Catheter 11/14/18 1325 less than 1 day          Peripheral Intravenous Line                 Peripheral IV - Single Lumen 03/10/17 0655 Left Wrist 615 days         Peripheral IV - Single Lumen 11/13/18 1335 Left Antecubital 1 day                Physical Exam   Constitutional: She is oriented to person, place, and time. She appears well-developed and well-nourished. No distress.   Cardiovascular: Normal rate and regular rhythm. Exam reveals no gallop.   No murmur heard.  Pulmonary/Chest: Effort normal and breath sounds normal. No respiratory distress. She has no wheezes.   Abdominal: Soft. Bowel sounds are normal. She exhibits no distension. There is no tenderness.   Neurological: She is alert and oriented to person, place, and time.   Skin: Skin is warm and dry.        Significant Labs:     Recent Labs   Lab 11/15/18  0522      K 5.2*      CO2 22*   BUN 31*   CREATININE 2.3*   MG 1.3*     Recent Labs   Lab 11/15/18  0522   WBC 7.12   RBC 2.90*   HGB 8.4*   HCT 27.4*   *   MCV 95   MCH 29.0   MCHC 30.7*

## 2018-11-15 NOTE — PLAN OF CARE
Problem: Infection, Risk/Actual (Adult)  Goal: Infection Prevention/Resolution  Patient will demonstrate the desired outcomes by discharge/transition of care.  Outcome: Ongoing (interventions implemented as appropriate)       Problem: Fall Risk (Adult)  Goal: Absence of Falls  Patient will demonstrate the desired outcomes by discharge/transition of care.  Outcome: Ongoing (interventions implemented as appropriate)       Problem: Patient Care Overview  Goal: Plan of Care Review  Outcome: Ongoing (interventions implemented as appropriate)  Patient AAOx4, patient free from falls during shift, bed alarm placed. [place] catheter removed, due to void by 7:30pm. Female external catheter placed, due to be changed 1:30 am. R hip incision clean, dry, intact. No signs of infection. Skin integrity intact. Patient tolerating diet. VSS. Safety maintained, will continue to monitor.     Problem: Pressure Ulcer Risk (Herman Scale) (Adult,Obstetrics,Pediatric)  Goal: Skin Integrity  Patient will demonstrate the desired outcomes by discharge/transition of care.  Outcome: Ongoing (interventions implemented as appropriate)

## 2018-11-15 NOTE — PT/OT/SLP PROGRESS
Physical Therapy Treatment    Patient Name:  Adilia Rosas   MRN:  [id number]    Recommendations:     Discharge Recommendations:  nursing facility, skilled   Discharge Equipment Recommendations:     Barriers to discharge: decreased mobility,strength and endurance    Assessment:     Adilia Rosas is a 83 y.o. female admitted with a medical diagnosis of Fracture of femur.  She presents with the following impairments/functional limitations:  weakness, impaired endurance, impaired functional mobilty, gait instability, impaired balance, decreased lower extremity function, decreased ROM, impaired coordination, impaired skin, orthopedic precautions ,pt with good participation and requires assistance with all mobility for safety,pt will benefit from continuing PT services upon discharge.    Rehab Prognosis:  Good; patient would benefit from acute skilled PT services to address these deficits and reach maximum level of function.      Recent Surgery: Procedure(s) (LRB):  HEMIARTHROPLASTY, HIP (Right) 2 Days Post-Op    Plan:     During this hospitalization, patient to be seen BID to address the above listed problems via gait training, therapeutic activities, therapeutic exercises  · Plan of Care Expires:  12/14/18   Plan of Care Reviewed with: patient, daughter    Subjective     Communicated with nsg prior to session.  Patient found supine upon PT entry to room, agreeable to treatment.      Chief Complaint: n/a  Patient comments/goals: pt pleased with progress  Pain/Comfort:  · Pain Rating 1: (no c/o's)    Patients cultural, spiritual, [place]ybeterian conflicts given the current situation:      Objective:     Patient found with: PureWick, oxygen     General Precautions: Standard, fall   Orthopedic Precautions:RLE weight bearing as tolerated   Braces: N/A     Functional Mobility:  · Bed Mobility:     · Supine to Sit: minimum assistance  · Sit to Supine: moderate assistance and at le's  · Transfers:     · Sit to Stand:  moderate  assistance with rolling walker  · Gait: amb ~14' X 1 with RW and Min A  · Balance: fair standing balance with RW      AM-PAC 6 CLICK MOBILITY  Turning over in bed (including adjusting bedclothes, sheets and blankets)?: 3  Sitting down on and standing up from a chair with arms (e.g., wheelchair, bedside commode, etc.): 2  Moving from lying on back to sitting on the side of the bed?: 3  Moving to and from a bed to a chair (including a wheelchair)?: 3  Need to walk in hospital room?: 3  Climbing 3-5 steps with a railing?: 1  Basic Mobility Total Score: 15       Therapeutic Activities and Exercises: le supine ex's X 10-12 reps inc: ap,qs,hs,abd/add,slr       Patient left supine with all lines intact, call button in reach, bed alarm on and daughter present..    GOALS: see general POC  Multidisciplinary Problems     Physical Therapy Goals        Problem: Physical Therapy Goal    Goal Priority Disciplines Outcome Goal Variances Interventions   Physical Therapy Goal     PT, PT/OT Ongoing (interventions implemented as appropriate)     Description:  Goals to be met by: 2018     Patient will increase functional independence with mobility by performin. Supine to sit with Modified [place]  2. Sit to stand transfer with Stand-by Assistance  3. Bed to chair transfer with Stand-by Assistance using Rolling Walker  4. Gait  x 50 feet with Stand-by Assistance using Rolling Walker.                       Time Tracking:     PT Received On: 11/15/18  PT Start Time: 1438     PT Stop Time: 1503  PT Total Time (min): 25 min     Billable Minutes: Gait Training 15 and Therapeutic Exercise 10    Treatment Type: Treatment  PT/PTA: PTA     PTA Visit Number: 1     Zac Woodson, PTA  11/15/2018

## 2018-11-15 NOTE — PT/OT/SLP PROGRESS
Occupational Therapy   Treatment    Name: Adilia Rosas  MRN: [id number]  Admitting Diagnosis:  Fracture of femur  2 Days Post-Op    Recommendations:     Discharge Recommendations: nursing facility, skilled  Discharge Equipment Recommendations:  bath bench  Barriers to discharge:  Inaccessible home environment, Decreased caregiver support    Subjective     Pain/Comfort:  · Pain Rating 1: (no complaints during session )    Objective:     Communicated with: nsg prior to session.   General Precautions: Standard, fall   Orthopedic Precautions:RLE weight bearing as tolerated   Braces: N/A     Occupational Performance:    Bed Mobility:    · Patient completed Scooting/Bridging with contact guard assistance  · Patient completed Supine to Sit with minimum assistance  · Patient completed Sit to Supine with maximal assistance     Functional Mobility/Transfers:  · Patient completed Sit <> Stand Transfer with moderate assistance and of 2 persons  with  rolling walker   · Patient completed Toilet Transfer Step Transfer technique with minimum assistance with  rolling walker and bedside commode  · Functional Mobility: Min A with RW; see PT note for details     Activities of Daily Living:  · Lower Body Dressing: total assistance    · Toileting: maximal assistance and total assistance on BSC; assist for all steps       [facility] 6 Click ADL: 15    Treatment & Education:  Pt performing skills as listed above  Improvement noted in bed mobility   Sit /stands remains mod A of 2 and requires bed height elevated; pt pushing posteriorly to stand requiring cues to lean forward and use BUEs to push form seated surfaces   Ambulation x 2 trials in room with RW; Min A; requires cues for sequencing and safety with RW as well   T/f to BSC for toileting- external catheter with poor suction and urinating in BSC; total A for clothing management and hygiene    Patient left supine with all lines intact, call button in reach, bed alarm on, nsg notified and  family  present  Education:    Assessment:     Adilia Rosas is a 83 y.o. female with a medical diagnosis of Fracture of femur.  She presents with the following performance deficits affecting function are weakness, impaired self care skills, impaired balance, impaired endurance, impaired functional mobilty, gait instability, decreased lower extremity function, decreased upper extremity function, pain, edema, orthopedic precautions, decreased safety awareness, impaired skin, decreased ROM. Pt progressing well towards goals this date. Short distance ambulation performed x 2 trials as well as toileting from [facility]. Cont OT POC. SNF at d/c    Rehab Prognosis:  Good; patient would benefit from acute skilled OT services to address these deficits and reach maximum level of function.       Plan:     Patient to be seen 5 x/week to address the above listed problems via self-care/home management, therapeutic activities, therapeutic exercises  · Plan of Care Expires: 12/14/18  · Plan of Care Reviewed with: patient, daughter    This Plan of care has been discussed with the patient who was involved in its development and understands and is in agreement with the identified goals and treatment plan    GOALS:   Multidisciplinary Problems     Occupational Therapy Goals        Problem: Occupational Therapy Goal    Goal Priority Disciplines Outcome Interventions   Occupational Therapy Goal     OT, PT/OT Ongoing (interventions implemented as appropriate)    Description:  Goals to be met by: 12/14/18     Patient will increase functional independence with ADLs by performing:    Grooming while standing with Contact Guard Assistance.  Toileting from bedside commode with Minimal Assistance for hygiene and clothing management.   Supine to sit with Minimal Assistance.  Step transfer with Contact Guard Assistance  Toilet transfer to bedside commode with Contact Guard Assistance.  Upper extremity exercise program x10 reps per handout, with  independence.                      Time Tracking:     OT Date of Treatment: 11/15/18  OT Start Time: 0942  OT Stop Time: 1015  OT Total Time (min): 33 min    Billable Minutes:Self Care/Home Management 17 co treatment with PT     Jennifer Rolon OT  11/15/2018

## 2018-11-15 NOTE — NURSING
Dr. Mayes called advised to check patients BP before giving lasix; patients BP 94/51. Dr. Mayes advised to continue with administration of lasix. Family refused.

## 2018-11-15 NOTE — PLAN OF CARE
Problem: Physical Therapy Goal  Goal: Physical Therapy Goal  Goals to be met by: 2018     Patient will increase functional independence with mobility by performin. Supine to sit with Modified [place]  2. Sit to stand transfer with Stand-by Assistance  3. Bed to chair transfer with Stand-by Assistance using Rolling Walker  4. Gait  x 50 feet with Stand-by Assistance using Rolling Walker.      Outcome: Ongoing (interventions implemented as appropriate)  Goals ongoing

## 2018-11-15 NOTE — PLAN OF CARE
Problem: Patient Care Overview  Goal: Plan of Care Review  Outcome: Ongoing (interventions implemented as appropriate)  Pt AAOx4. Right hip dressing remains CDI. Pt complains of minimal pain with relief from IV tylenol. BP monitored closely. MD notified. 2nd bolus ordered. Pt remains on 1L NC. Female external catheter with 50 ml urine. Cardiac monitoring continued. Bed locked in lowest position, bed alarm set and call bell within reach. Daughter at the bedside. Will continue to monitor.

## 2018-11-15 NOTE — PROGRESS NOTES
Progress Note                     [facility]   Admit Date: 11/12/2018   LOS: 3 days     SUBJECTIVE:     Subjective: The patient said she is feeling really good, eating and drinking, pain is tolerable. Passed some gas, no bm yet. Has a female cath connected to the wall, good UOP.On breathing neb for slight high K, asymtomatic ekg no peak t-wave. MAP was 66 and received couple boluses overnight, became edematous in the legs, cr bump alittle. Family very appreciative. Start to working with pt/ot today.       HPI:  Patient is a 83 y.o. female who presents to to the ED as a transfer from [facility] 2/2 to lack of orthopedic coverage.. Patient stood up from her chair and fell on to her right side when attempting to take a step. Patient denies any dizziness or LOC prior to fall or any head trauma, Patient was found to have a right femoral neck fracture. At presentation patient denied any HA, fever, chills, chest pain, SOB. Patient now continues to endorses right hip pain, rated 7/10, does not radiate, localized to site of injury with no loss of sensation.         Scheduled Meds:   amLODIPine  10 mg Oral QHS    ascorbic acid (vitamin C)  500 mg Oral Daily    atorvastatin  80 mg Oral Daily    benazepril  20 mg Oral Daily    enoxaparin  30 mg Subcutaneous Daily    ferrous gluconate  324 mg Oral Daily with breakfast    furosemide  20 mg Intravenous Once    HYDROcodone-acetaminophen  1 tablet Oral Q6H    lubiprostone  24 mcg Oral BID WM    magnesium oxide  400 mg Oral Q4H While awake    metoprolol tartrate  50 mg Oral BID    mirtazapine  15 mg Oral QHS    pantoprazole  40 mg Oral BID    senna-docusate 8.6-50 mg  1 tablet Oral Daily    sodium bicarbonate  1,300 mg Oral BID    vitamin D  2,000 Units Oral Daily     Continuous Infusions:  PRN Meds:albuterol, albuterol sulfate, bisacodyl, dextrose 50%, dextrose 50%, glucagon (human recombinant), glucose, glucose, morphine, ondansetron,  sodium chloride 0.9%    Review of patient's allergies indicates:   Allergen Reactions    Celebrex [celecoxib] Other (See Comments)     Pain down her spine       Review of Systems  See subjective    OBJECTIVE:     Vital Signs (Most Recent)  Temp: 98.2 °F (36.8 °C) (11/15/18 0751)  Pulse: 73 (11/15/18 0816)  Resp: 18 (11/15/18 0816)  BP: (!) 92/54 (11/15/18 0751)  SpO2: 96 % (11/15/18 0816)    Vital Signs Range (Last 24H):  Temp:  [97.9 °F (36.6 °C)-99.4 °F (37.4 °C)]   Pulse:  [64-88]   Resp:  [16-18]   BP: ()/(52-65)   SpO2:  [92 %-96 %]     I & O (Last 24H):    Intake/Output Summary (Last 24 hours) at 11/15/2018 1004  Last data filed at 11/15/2018 0436  Gross per 24 hour   Intake 590 ml   Output 550 ml   Net 40 ml     Wt Readings from Last 3 Encounters:   11/14/18 67.9 kg (149 lb 11.1 oz)   11/12/18 76.7 kg (169 lb)   11/02/18 72.5 kg (159 lb 14.4 oz)     Physical Exam:  Physical Exam   Constitutional: She is oriented to person, place, and time. She appears well-developed and well-nourished.   HENT:   Head: Normocephalic and atraumatic.   Eyes: Conjunctivae and EOM are normal. Pupils are equal, round, and reactive to light.   Neck: Normal range of motion. Neck supple.   Cardiovascular: Normal rate. Exam reveals s4.  Pulmonary/Chest: Effort normal and breath sounds normal. Slight wheezing at the bases rosalinda.  Abdominal: Soft. Bowel sounds are normal.   Musculoskeletal: She exhibits tenderness and deformity. She exhibits +1 pitting edema rosalinda.   Right leg packed and no sign of acute bleeding, moving toes well.  R forearm slight bleeding from IV site.  Neurological: She is alert and oriented to person, place, and time.   Skin: Skin is warm and dry. Capillary refill takes less than 2 seconds.   Psychiatric: She has a normal mood and affect.         Laboratory:  LABS  CBC  Recent Labs   Lab 11/13/18  0440 11/14/18  0448 11/15/18  0522   WBC 5.81 7.14 7.12   RBC 3.76* 3.48* 2.90*   HGB 11.1* 10.2* 8.4*   HCT 36.6*  33.1* 27.4*    122* 118*   MCV 97 95 95   MCH 29.5 29.3 29.0   MCHC 30.3* 30.8* 30.7*     BMP  Recent Labs   Lab 11/13/18  1555 11/14/18  0448 11/15/18  0522    138 136   K 5.4* 5.2* 5.2*   CO2 18* 17* 22*   * 112* 108   BUN 27* 26* 31*   CREATININE 1.9* 2.0* 2.3*   GLU 78 78 84       POCT-Glucose  POCT Glucose   Date Value Ref Range Status   11/15/2018 85 70 - 110 mg/dL Final   11/14/2018 116 (H) 70 - 110 mg/dL Final   11/14/2018 103 70 - 110 mg/dL Final   11/14/2018 87 70 - 110 mg/dL Final   11/14/2018 85 70 - 110 mg/dL Final   11/13/2018 101 70 - 110 mg/dL Final   11/13/2018 83 70 - 110 mg/dL Final   11/12/2018 95 70 - 110 mg/dL Final   11/12/2018 84 70 - 110 mg/dL Final       Recent Labs   Lab 11/13/18  0440 11/13/18  1555 11/14/18  0448 11/15/18  0522   CALCIUM 9.8 10.1 9.3 8.6*   MG 1.4*  --  1.4* 1.3*   PHOS 2.7  --  3.2 2.6*     LFT  Recent Labs   Lab 11/13/18  1555 11/14/18  0448 11/15/18  0522   PROT 7.0 5.9* 5.2*   ALBUMIN 3.2* 2.9* 2.4*   BILITOT 0.7 0.4 0.4   AST 22 27 23   ALKPHOS 86 74 68   ALT 10 10 <5*         COAGS  Recent Labs   Lab 11/12/18  1342 11/13/18  1018   INR 1.1 1.1   APTT 32.2* 29.9     CE  Recent Labs   Lab 11/13/18  1005 11/13/18  1018 11/13/18  1323   TROPONINI 0.023 0.014 0.006     ABGs  No results for input(s): PH, PCO2, PO2, HCO3, POCSATURATED, BE in the last 24 hours.  BNP  Recent Labs   Lab 11/13/18  1323   *     UA  Recent Labs   Lab 11/14/18  1250   COLORU Yellow   SPECGRAV 1.020   PHUR 6.0   PROTEINUA 1+*   BACTERIA Rare     LAST HbA1c  Lab Results   Component Value Date    HGBA1C 4.8 11/12/2018         Diagnostic Results:  Recent Labs     11/13/18  0440 11/14/18  0448 11/15/18  0522   WBC 5.81 7.14 7.12   HGB 11.1* 10.2* 8.4*   HCT 36.6* 33.1* 27.4*    122* 118*   MCV 97 95 95   RDW 14.8* 14.7* 14.8*       Recent Labs     11/13/18  1555 11/14/18  0448 11/15/18  0522    138 136   K 5.4* 5.2* 5.2*   * 112* 108   CO2 18* 17* 22*    GLU 78 78 84   BUN 27* 26* 31*   CREATININE 1.9* 2.0* 2.3*   CALCIUM 10.1 9.3 8.6*   PROT 7.0 5.9* 5.2*   ALBUMIN 3.2* 2.9* 2.4*   BILITOT 0.7 0.4 0.4   ALKPHOS 86 74 68   AST 22 27 23   ALT 10 10 <5*   ANIONGAP 9 9 6*   ESTGFRAFRICA 28* 26* 22*   EGFRNONAA 24* 23* 19*       Recent Labs     11/13/18  0440 11/14/18  0448 11/15/18  0522   MG 1.4* 1.4* 1.3*   PHOS 2.7 3.2 2.6*       Coags  Lab Results   Component Value Date    INR 1.1 11/13/2018    INR 1.1 11/12/2018    INR 1.1 01/06/2017    APTT 29.9 11/13/2018    APTT 32.2 (H) 11/12/2018    APTT 33.8 (H) 01/06/2017     Recent Labs   Lab 11/12/18  1342 11/13/18  1018   INR 1.1 1.1   APTT 32.2* 29.9       A1c:   Lab Results   Component Value Date    HGBA1C 4.8 11/12/2018   , Last Gluc:   Recent Labs   Lab 11/13/18  2132 11/14/18  0613 11/14/18  1125 11/14/18  1546 11/14/18  1939 11/15/18  0610   POCTGLUCOSE 101 85 87 103 116* 85       TSH:   Lab Results   Component Value Date    TSH 0.983 11/12/2018         Cardiac Enzymes  Recent Labs     11/13/18  1005 11/13/18  1018 11/13/18  1323   TROPONINI 0.023 0.014 0.006         Urinalysis  Urinalysis  Recent Labs   Lab 11/14/18  1250   COLORU Yellow   SPECGRAV 1.020   PHUR 6.0   PROTEINUA 1+*   BACTERIA Rare   NITRITE Negative   LEUKOCYTESUR Trace*   UROBILINOGEN Negative   HYALINECASTS 1     Recent Labs   Lab 11/14/18  1250   COLORU Yellow   SPECGRAV 1.020   PHUR 6.0   PROTEINUA 1+*   BACTERIA Rare       Micro  Microbiology Results (last 7 days)     ** No results found for the last 168 hours. **             ABG  No results for input(s): PH, PCO2, PO2, HCO3, POCSATURATED, BE in the last 168 hours.      Imaging   Imaging Results    None             IP CONSULT TO NEPHROLOGY-[facility]  IP CONSULT TO ORTHOPEDIC SURGERY  [facility]  IP CONSULT TO ANESTHESIOLOGY    ASSESSMENT/PLAN:   Right femoral neck fracture   -Diagnosed by xray   -Right Leg externally rotated on exam   -No notable neurologic deficits    -Popliteal and dorsalis pedis pulses intact  -Orthopedics consulted, s/p Hemiarthroplasty of Right Hip  -Continue Pain control as tolerated   working with pt/ot     Heart Failure with preserved EF   -Previous TTE shows diastolic dysfunction. EF 55-60%. (9/2016)   -Will repeat TTE today.   -EKG today sinus  - consulted cards, f/u recs, cleared by Dr. Curiel before the surgery  - Fluid balance.        HTN  -Home regimen: Hydralizine 50mg PO BID, Isosorbide Mono-nitrate 60mg PO qHs, Lisinopril 5mg PO daily, Carvedilol 6.25mg PO BID, Amlodipine 10mg PO daily. Eliquis 10mg PO BID and Atorvastatin 80mg PO BID  -Will adjust accordingly following assement of vitals and fluid status       CKD Grade 4  GFR 26   CR bump, will trend  Will continue to monitor  Will consult nephrology - Preparation for future dialysis?? not during this admission.     GERD  -Continue PPI      Arthritis   morphin 2mg prn q2hr, and norco 10 scheduled will give before pt/ot      Hypomagnesium   repleted    Hyperkalemia  5.2 am   Continue albuterol neb  Expect to trend down      PPx: SCDs /PENELOPE hose  Code: Full     Dc'd IVF per combined HF, pt tolerates po. Ekg sinus serjio,  Cxr, INR, PT/PTT, trops, bnp slightly higher s/p 1dose lasix, H/H stable, 2DE combined HF 35~40 with DD.      Dispo:   Working w/ PT/OT s/p Hemiarthroplasty of Right Hip, F/U with cxr, still on O2, will CXR and BNP, and + for Fluid, will gentle diuresing, and wean off O2.    Jose Mayes MD

## 2018-11-16 LAB
ALBUMIN SERPL BCP-MCNC: 2.3 G/DL
ALP SERPL-CCNC: 80 U/L
ALT SERPL W/O P-5'-P-CCNC: <5 U/L
ANION GAP SERPL CALC-SCNC: 6 MMOL/L
AST SERPL-CCNC: 25 U/L
BASOPHILS # BLD AUTO: 0.01 K/UL
BASOPHILS NFR BLD: 0.1 %
BILIRUB SERPL-MCNC: 0.3 MG/DL
BUN SERPL-MCNC: 36 MG/DL
CALCIUM SERPL-MCNC: 8.7 MG/DL
CHLORIDE SERPL-SCNC: 110 MMOL/L
CO2 SERPL-SCNC: 22 MMOL/L
CREAT SERPL-MCNC: 2.5 MG/DL
CREAT UR-MCNC: 48.7 MG/DL
DIFFERENTIAL METHOD: ABNORMAL
EOSINOPHIL # BLD AUTO: 0.3 K/UL
EOSINOPHIL NFR BLD: 3.8 %
EOSINOPHIL URNS QL WRIGHT STN: NORMAL
ERYTHROCYTE [DISTWIDTH] IN BLOOD BY AUTOMATED COUNT: 14.8 %
EST. GFR  (AFRICAN AMERICAN): 20 ML/MIN/1.73 M^2
EST. GFR  (NON AFRICAN AMERICAN): 17 ML/MIN/1.73 M^2
GLUCOSE SERPL-MCNC: 92 MG/DL
HCT VFR BLD AUTO: 29 %
HGB BLD-MCNC: 8.8 G/DL
LYMPHOCYTES # BLD AUTO: 1.1 K/UL
LYMPHOCYTES NFR BLD: 15.7 %
MAGNESIUM SERPL-MCNC: 1.5 MG/DL
MCH RBC QN AUTO: 29.1 PG
MCHC RBC AUTO-ENTMCNC: 30.3 G/DL
MCV RBC AUTO: 96 FL
MONOCYTES # BLD AUTO: 0.6 K/UL
MONOCYTES NFR BLD: 8.7 %
NEUTROPHILS # BLD AUTO: 4.9 K/UL
NEUTROPHILS NFR BLD: 71.4 %
PHOSPHATE SERPL-MCNC: 2 MG/DL
PLATELET # BLD AUTO: 118 K/UL
PMV BLD AUTO: 11.2 FL
POCT GLUCOSE: 116 MG/DL (ref 70–110)
POCT GLUCOSE: 122 MG/DL (ref 70–110)
POCT GLUCOSE: 126 MG/DL (ref 70–110)
POCT GLUCOSE: 98 MG/DL (ref 70–110)
POTASSIUM SERPL-SCNC: 5.3 MMOL/L
PROT SERPL-MCNC: 5.5 G/DL
RBC # BLD AUTO: 3.02 M/UL
SODIUM SERPL-SCNC: 138 MMOL/L
SODIUM UR-SCNC: 72 MMOL/L
UUN UR-MCNC: 364 MG/DL
WBC # BLD AUTO: 6.89 K/UL

## 2018-11-16 PROCEDURE — 83735 ASSAY OF MAGNESIUM: CPT

## 2018-11-16 PROCEDURE — 63600175 PHARM REV CODE 636 W HCPCS: Performed by: STUDENT IN AN ORGANIZED HEALTH CARE EDUCATION/TRAINING PROGRAM

## 2018-11-16 PROCEDURE — 85025 COMPLETE CBC W/AUTO DIFF WBC: CPT

## 2018-11-16 PROCEDURE — 97116 GAIT TRAINING THERAPY: CPT

## 2018-11-16 PROCEDURE — 36415 COLL VENOUS BLD VENIPUNCTURE: CPT

## 2018-11-16 PROCEDURE — 27000221 HC OXYGEN, UP TO 24 HOURS

## 2018-11-16 PROCEDURE — 80053 COMPREHEN METABOLIC PANEL: CPT

## 2018-11-16 PROCEDURE — 84100 ASSAY OF PHOSPHORUS: CPT

## 2018-11-16 PROCEDURE — 97110 THERAPEUTIC EXERCISES: CPT

## 2018-11-16 PROCEDURE — 63600175 PHARM REV CODE 636 W HCPCS: Performed by: FAMILY MEDICINE

## 2018-11-16 PROCEDURE — 94640 AIRWAY INHALATION TREATMENT: CPT

## 2018-11-16 PROCEDURE — 25000003 PHARM REV CODE 250: Performed by: STUDENT IN AN ORGANIZED HEALTH CARE EDUCATION/TRAINING PROGRAM

## 2018-11-16 PROCEDURE — 97530 THERAPEUTIC ACTIVITIES: CPT

## 2018-11-16 PROCEDURE — 25000242 PHARM REV CODE 250 ALT 637 W/ HCPCS: Performed by: STUDENT IN AN ORGANIZED HEALTH CARE EDUCATION/TRAINING PROGRAM

## 2018-11-16 PROCEDURE — 99233 SBSQ HOSP IP/OBS HIGH 50: CPT | Mod: ,,, | Performed by: INTERNAL MEDICINE

## 2018-11-16 PROCEDURE — 82570 ASSAY OF URINE CREATININE: CPT

## 2018-11-16 PROCEDURE — 87205 SMEAR GRAM STAIN: CPT

## 2018-11-16 PROCEDURE — 11000001 HC ACUTE MED/SURG PRIVATE ROOM

## 2018-11-16 PROCEDURE — 94761 N-INVAS EAR/PLS OXIMETRY MLT: CPT

## 2018-11-16 PROCEDURE — 25000003 PHARM REV CODE 250: Performed by: INTERNAL MEDICINE

## 2018-11-16 PROCEDURE — 25000003 PHARM REV CODE 250: Performed by: FAMILY MEDICINE

## 2018-11-16 PROCEDURE — 84300 ASSAY OF URINE SODIUM: CPT

## 2018-11-16 PROCEDURE — 84540 ASSAY OF URINE/UREA-N: CPT

## 2018-11-16 RX ORDER — METOPROLOL SUCCINATE 25 MG/1
25 TABLET, EXTENDED RELEASE ORAL DAILY
Status: DISCONTINUED | OUTPATIENT
Start: 2018-11-16 | End: 2018-11-19 | Stop reason: HOSPADM

## 2018-11-16 RX ORDER — MAGNESIUM SULFATE 1 G/100ML
1 INJECTION INTRAVENOUS ONCE
Status: COMPLETED | OUTPATIENT
Start: 2018-11-16 | End: 2018-11-16

## 2018-11-16 RX ORDER — FAMOTIDINE 20 MG/1
20 TABLET, FILM COATED ORAL 2 TIMES DAILY
Status: DISCONTINUED | OUTPATIENT
Start: 2018-11-16 | End: 2018-11-16

## 2018-11-16 RX ORDER — FAMOTIDINE 20 MG/1
20 TABLET, FILM COATED ORAL DAILY
Status: DISCONTINUED | OUTPATIENT
Start: 2018-11-17 | End: 2018-11-19 | Stop reason: HOSPADM

## 2018-11-16 RX ORDER — LANOLIN ALCOHOL/MO/W.PET/CERES
400 CREAM (GRAM) TOPICAL
Status: COMPLETED | OUTPATIENT
Start: 2018-11-16 | End: 2018-11-16

## 2018-11-16 RX ORDER — ALBUTEROL SULFATE 2.5 MG/.5ML
5 SOLUTION RESPIRATORY (INHALATION) ONCE
Status: COMPLETED | OUTPATIENT
Start: 2018-11-16 | End: 2018-11-16

## 2018-11-16 RX ORDER — SODIUM,POTASSIUM PHOSPHATES 280-250MG
2 POWDER IN PACKET (EA) ORAL
Status: COMPLETED | OUTPATIENT
Start: 2018-11-16 | End: 2018-11-16

## 2018-11-16 RX ADMIN — POTASSIUM & SODIUM PHOSPHATES POWDER PACK 280-160-250 MG 2 PACKET: 280-160-250 PACK at 01:11

## 2018-11-16 RX ADMIN — VITAMIN D, TAB 1000IU (100/BT) 2000 UNITS: 25 TAB at 08:11

## 2018-11-16 RX ADMIN — SODIUM BICARBONATE 650 MG TABLET 1300 MG: at 09:11

## 2018-11-16 RX ADMIN — ENOXAPARIN SODIUM 30 MG: 100 INJECTION SUBCUTANEOUS at 05:11

## 2018-11-16 RX ADMIN — HYDROCODONE BITARTRATE AND ACETAMINOPHEN 1 TABLET: 10; 325 TABLET ORAL at 10:11

## 2018-11-16 RX ADMIN — OXYCODONE HYDROCHLORIDE AND ACETAMINOPHEN 500 MG: 500 TABLET ORAL at 08:11

## 2018-11-16 RX ADMIN — PANTOPRAZOLE SODIUM 40 MG: 40 TABLET, DELAYED RELEASE ORAL at 08:11

## 2018-11-16 RX ADMIN — MAGNESIUM OXIDE TAB 400 MG (241.3 MG ELEMENTAL MG) 400 MG: 400 (241.3 MG) TAB at 08:11

## 2018-11-16 RX ADMIN — MAGNESIUM OXIDE TAB 400 MG (241.3 MG ELEMENTAL MG) 400 MG: 400 (241.3 MG) TAB at 05:11

## 2018-11-16 RX ADMIN — POTASSIUM & SODIUM PHOSPHATES POWDER PACK 280-160-250 MG 2 PACKET: 280-160-250 PACK at 05:11

## 2018-11-16 RX ADMIN — ATORVASTATIN CALCIUM 80 MG: 40 TABLET, FILM COATED ORAL at 08:11

## 2018-11-16 RX ADMIN — MAGNESIUM OXIDE TAB 400 MG (241.3 MG ELEMENTAL MG) 400 MG: 400 (241.3 MG) TAB at 01:11

## 2018-11-16 RX ADMIN — ALBUTEROL SULFATE 5 MG: 2.5 SOLUTION RESPIRATORY (INHALATION) at 09:11

## 2018-11-16 RX ADMIN — FERROUS GLUCONATE 324 MG: 324 TABLET ORAL at 08:11

## 2018-11-16 RX ADMIN — POTASSIUM & SODIUM PHOSPHATES POWDER PACK 280-160-250 MG 2 PACKET: 280-160-250 PACK at 09:11

## 2018-11-16 RX ADMIN — MAGNESIUM SULFATE 1 G: 1 INJECTION INTRAVENOUS at 10:11

## 2018-11-16 RX ADMIN — STANDARDIZED SENNA CONCENTRATE AND DOCUSATE SODIUM 1 TABLET: 8.6; 5 TABLET, FILM COATED ORAL at 08:11

## 2018-11-16 RX ADMIN — LUBIPROSTONE 24 MCG: 24 CAPSULE, GELATIN COATED ORAL at 05:11

## 2018-11-16 RX ADMIN — LUBIPROSTONE 24 MCG: 24 CAPSULE, GELATIN COATED ORAL at 08:11

## 2018-11-16 RX ADMIN — METOPROLOL SUCCINATE 25 MG: 25 TABLET, EXTENDED RELEASE ORAL at 05:11

## 2018-11-16 RX ADMIN — MAGNESIUM OXIDE TAB 400 MG (241.3 MG ELEMENTAL MG) 400 MG: 400 (241.3 MG) TAB at 10:11

## 2018-11-16 RX ADMIN — SODIUM BICARBONATE 650 MG TABLET 1300 MG: at 08:11

## 2018-11-16 NOTE — ASSESSMENT & PLAN NOTE
-SBP 90s-110s overnight  -ACEI, BB and CCB discontinued per primary team  -will plan to resume low dose Toprol XL as long as BP greater than 90mmHg

## 2018-11-16 NOTE — PLAN OF CARE
Problem: Patient Care Overview  Goal: Plan of Care Review  Outcome: Ongoing (interventions implemented as appropriate)   11/16/18 1297   Coping/Psychosocial   Plan Of Care Reviewed With patient;daughter   Patient is alert, awake, and orient. Patient on tele, not ectopy noted. Patient vital signs and labs are stable. Patient can turn herself independently in the bed as needed. Patient bed in low position, bed alarm on the bed, and call light within reach.

## 2018-11-16 NOTE — PT/OT/SLP PROGRESS
Occupational Therapy  Visit Attempt     Patient Name:  Adilia Rosas   MRN:  [id number]    Patient not seen this PM secondary to currently with charge nurse discussing care  . Will follow-up as available     Jennifer Rolon OT  11/16/2018

## 2018-11-16 NOTE — PT/OT/SLP PROGRESS
Physical Therapy Treatment    Patient Name:  Adilia Rosas   MRN:  [id number]    Recommendations:     Discharge Recommendations:  nursing facility, skilled   Discharge Equipment Recommendations: bath bench   Barriers to discharge: decreased endurance and strength    Assessment:     Adilia Rosas is a 83 y.o. female admitted with a medical diagnosis of Fracture of femur.  She presents with the following impairments/functional limitations:  weakness, impaired endurance, impaired functional mobilty, gait instability, impaired balance, decreased lower extremity function, decreased ROM, impaired coordination, impaired skin, edema, orthopedic precautions ,pt with increased sitting tolerance today and able to use bathroom with Min A,pt will benefit from SNF upon discharge to increase functional independence.    Rehab Prognosis:  Good; patient would benefit from acute skilled PT services to address these deficits and reach maximum level of function.      Recent Surgery: Procedure(s) (LRB):  HEMIARTHROPLASTY, HIP (Right) 3 Days Post-Op    Plan:     During this hospitalization, patient to be seen BID to address the above listed problems via gait training, therapeutic activities, therapeutic exercises  · Plan of Care Expires:  12/14/18   Plan of Care Reviewed with: patient, daughter    Subjective     Communicated with nsg prior to session.  Patient found supine upon PT entry to room, agreeable to treatment.      Chief Complaint: n/a  Patient comments/goals: pt glad to use regular toilet  Pain/Comfort:  · Pain Rating 1: 0/10    Patients cultural, spiritual, Anabaptist conflicts given the current situation:      Objective:     Patient found with: oxygen     General Precautions: Standard, fall   Orthopedic Precautions:RLE weight bearing as tolerated   Braces: N/A     Functional Mobility:  · Bed Mobility:     · Sit to Supine: moderate assistance and at le's  · Transfers:     · Sit to Stand:  moderate assistance with rolling  walker  · Toilet Transfer: minimum assistance with  rolling walker  using  ambulation  · Gait: amb ~10' X 2 with RW from chair to bathroom and bathroom to bed with Min A  · Balance: fair standing balance with RW      AM-PAC 6 CLICK MOBILITY  Turning over in bed (including adjusting bedclothes, sheets and blankets)?: 3  Sitting down on and standing up from a chair with arms (e.g., wheelchair, bedside commode, etc.): 2  Moving from lying on back to sitting on the side of the bed?: 3  Moving to and from a bed to a chair (including a wheelchair)?: 3  Need to walk in hospital room?: 3  Climbing 3-5 steps with a railing?: 1  Basic Mobility Total Score: 15       Therapeutic Activities and Exercises: pt stood inside RW with CGA of PTA and nsg cleaned pt post BM       Patient left supine with all lines intact, call button in reach, bed alarm on, nsg notified and family present..    GOALS: see general POC  Multidisciplinary Problems     Physical Therapy Goals        Problem: Physical Therapy Goal    Goal Priority Disciplines Outcome Goal Variances Interventions   Physical Therapy Goal     PT, PT/OT Ongoing (interventions implemented as appropriate)     Description:  Goals to be met by: 2018     Patient will increase functional independence with mobility by performin. Supine to sit with Modified [place]  2. Sit to stand transfer with Stand-by Assistance  3. Bed to chair transfer with Stand-by Assistance using Rolling Walker  4. Gait  x 50 feet with Stand-by Assistance using Rolling Walker.                       Time Tracking:     PT Received On: 18  PT Start Time: 1248     PT Stop Time: 1318  PT Total Time (min): 30 min     Billable Minutes: Gait Training 15 and Therapeutic Activity 15    Treatment Type: Treatment  PT/PTA: PTA     PTA Visit Number: 3     Zac Woodson, PTA  2018

## 2018-11-16 NOTE — PROGRESS NOTES
Interval:   The patient is resting in bed, pain controlled, daughter at bedside. No acute events reported over night. Pain controlled  Vitals:  Temp:  [98.2 °F (36.8 °C)-98.7 °F (37.1 °C)] 98.3 °F (36.8 °C)  Pulse:  [64-78] 74  Resp:  [16-20] 18  SpO2:  [94 %-98 %] 96 %  BP: ()/(51-61) 104/57    Scheduled Meds:    ascorbic acid (vitamin C)  500 mg Oral Daily    atorvastatin  80 mg Oral Daily    enoxaparin  30 mg Subcutaneous Daily    ferrous gluconate  324 mg Oral Daily with breakfast    furosemide  20 mg Intravenous Once    HYDROcodone-acetaminophen  1 tablet Oral Q6H    lubiprostone  24 mcg Oral BID WM    mirtazapine  15 mg Oral QHS    pantoprazole  40 mg Oral BID    senna-docusate 8.6-50 mg  1 tablet Oral Daily    sodium bicarbonate  1,300 mg Oral BID    vitamin D  2,000 Units Oral Daily     Continuous Infusions:   PRN Meds: albuterol, albuterol sulfate, bisacodyl, dextrose 50%, dextrose 50%, glucagon (human recombinant), glucose, glucose, morphine, ondansetron, sodium chloride 0.9%    Diet: Diet Cardiac    Trended Lab Data:  Recent Labs   Lab 11/13/18  1555 11/14/18  0448 11/15/18  0522 11/16/18  0522   WBC  --  7.14 7.12 6.89   HGB  --  10.2* 8.4* 8.8*   HCT  --  33.1* 27.4* 29.0*   PLT  --  122* 118* 118*   MCV  --  95 95 96   RDW  --  14.7* 14.8* 14.8*    138 136  --    K 5.4* 5.2* 5.2*  --    * 112* 108  --    CO2 18* 17* 22*  --    BUN 27* 26* 31*  --    CREATININE 1.9* 2.0* 2.3*  --    GLU 78 78 84  --    PROT 7.0 5.9* 5.2*  --    ALBUMIN 3.2* 2.9* 2.4*  --    BILITOT 0.7 0.4 0.4  --    AST 22 27 23  --    ALKPHOS 86 74 68  --    ALT 10 10 <5*  --        I/O last 3 completed shifts:  In: 590 [P.O.:340; IV Piggyback:250]  Out: 1166 [Urine:1166]    Exam:  CV: 2+ RP   Pulm: no increased WOB  NAD, alert, awake, Ox3     Dressing clean dry and intact  5/5 ehl/fhl/ta/gs  [place] sa/[place]/dp/sp/t  Wwp, bcr    Impression:  82 y/o F who underwent right hip hemiarthroplasty on  11/13/2018    Plan:  PT recs for SNF, primary team to set up.  Patient to follow up with Dr. Franklin in 2 weeks  H/H stable  DVT/VTE prophylaxis: lovenox daily  Weight Bearing Status: as tolerted      Orthopaedics will sign off at this time. Please call with any questions or concerns    I agree with findings outlined by the resident.

## 2018-11-16 NOTE — ASSESSMENT & PLAN NOTE
-K+ 5.3 this AM up from 5.3 yesterday   -baseline K+ 4.2-4.6  -agree with holding of ACEI for now

## 2018-11-16 NOTE — PLAN OF CARE
met with patient and her daughter at bedside to discuss d/c planning.    Patient and daughter informed patient has been accepted to [facility] pending medical clearance. Both patient and daughter are in agreement with plan.    Daughter given 's business card and contact info left on white board.

## 2018-11-16 NOTE — PT/OT/SLP PROGRESS
Physical Therapy Treatment    Patient Name:  Adilia Rosas   MRN:  [id number]    Recommendations:     Discharge Recommendations:  nursing facility, skilled   Discharge Equipment Recommendations:     Barriers to discharge: decreased mobility and endurance    Assessment:     Adilia Rosas is a 83 y.o. female admitted with a medical diagnosis of Fracture of femur.  She presents with the following impairments/functional limitations:  weakness, impaired endurance, impaired functional mobilty, gait instability, impaired balance, decreased lower extremity function, decreased ROM, impaired coordination, impaired skin, edema, orthopedic precautions ,pt with good participation and improving endurance,increased sitting tolerance noted,pt continues to require assistance for mobility and will benefit from SNF upon discharge.    Rehab Prognosis:  Good; patient would benefit from acute skilled PT services to address these deficits and reach maximum level of function.      Recent Surgery: Procedure(s) (LRB):  HEMIARTHROPLASTY, HIP (Right) 3 Days Post-Op    Plan:     During this hospitalization, patient to be seen BID to address the above listed problems via gait training, therapeutic activities, therapeutic exercises  · Plan of Care Expires:  12/14/18   Plan of Care Reviewed with: patient, daughter    Subjective     Communicated with nsg prior to session.  Patient found supine upon PT entry to room, agreeable to treatment.      Chief Complaint: n/a  Patient comments/goals: pt agreeable to up in chair  Pain/Comfort:  · Pain Rating 1: (no c/o's)    Patients cultural, spiritual, Quaker conflicts given the current situation:      Objective:     Patient found with: oxygen, PureWick     General Precautions: Standard, fall   Orthopedic Precautions:RLE weight bearing as tolerated   Braces: N/A     Functional Mobility:  · Bed Mobility:     · Supine to Sit: minimum assistance and at R le  · Transfers:     · Sit to Stand:  moderate  assistance with rolling walker  · Bed to Chair: minimum assistance with  rolling walker  using  ambulation  · Gait: amb ~35' X 1 with RW and Min A with O2  · Balance: good sitting balance      AM-PAC 6 CLICK MOBILITY  Turning over in bed (including adjusting bedclothes, sheets and blankets)?: 3  Sitting down on and standing up from a chair with arms (e.g., wheelchair, bedside commode, etc.): 2  Moving from lying on back to sitting on the side of the bed?: 3  Moving to and from a bed to a chair (including a wheelchair)?: 3  Need to walk in hospital room?: 3  Climbing 3-5 steps with a railing?: 1  Basic Mobility Total Score: 15       Therapeutic Activities and Exercises: le supine ex's X 15 reps inc: ap,qs,hs,abd/add,slr with assistance on R       Patient left up in chair with all lines intact, call button in reach, nsg notified and daughter present..    GOALS:   Multidisciplinary Problems     Physical Therapy Goals        Problem: Physical Therapy Goal    Goal Priority Disciplines Outcome Goal Variances Interventions   Physical Therapy Goal     PT, PT/OT Ongoing (interventions implemented as appropriate)     Description:  Goals to be met by: 2018     Patient will increase functional independence with mobility by performin. Supine to sit with Modified [place]  2. Sit to stand transfer with Stand-by Assistance  3. Bed to chair transfer with Stand-by Assistance using Rolling Walker  4. Gait  x 50 feet with Stand-by Assistance using Rolling Walker.                       Time Tracking:     PT Received On: 18  PT Start Time: 914     PT Stop Time: 954  PT Total Time (min): 40 min     Billable Minutes: Gait Training 17, Therapeutic Activity 11 and Therapeutic Exercise 12    Treatment Type: Treatment  PT/PTA: PTA     PTA Visit Number: 2     Zac Woodson, PTA  2018

## 2018-11-16 NOTE — CONSULTS
[facility] Consult    Note  CC:   Patient is a 83 y.o. female who presents to to the ED as a transfer from [facility] 2/2 to lack of orthopedic coverage.. Patient stood up from her chair and fell on to her right side when attempting to take a step. Patient denies any dizziness or LOC prior to fall or any head trauma, Patient was found to have a right femoral neck fracture.        Subjective:   No complains   S/p hip surgery 11/13/18   Past Medical History:   Diagnosis Date    Arthritis     Elevated cholesterol     GERD (gastroesophageal reflux disease)     History of DVT (deep vein thrombosis) 2016    RUE    Hypertension       ?  Adilia  has a past medical history of Arthritis, Elevated cholesterol, GERD (gastroesophageal reflux disease), History of DVT (deep vein thrombosis) (2016), and Hypertension.  Adilia  has a past surgical history that includes Hysterectomy; Hernia repair; ORIF femur fracture (Left, 12/2016); HEMIARTHROPLASTY, HIP (Right, 11/13/2018); REMOVAL OF EXTERNAL FIXATION DEVICE (Left, 3/10/2017); APPLICATION-CAST LONG LEG (Left, 3/10/2017); REMOVAL-SUTURE (Left, 3/10/2017); IRRIGATION AND DEBRIDEMENT LOWER EXTREMITY (Left, 1/7/2017); PLACEMENT SPACER-ANTIBIOTIC (Left, 1/7/2017); IRRIGATION AND DEBRIDEMENT LOWER EXTREMITY; placement of antibiotic beads (Left, 12/27/2016); REMOVAL-HARDWARE-LEG (Left, 12/27/2016); APPLICATION-EXTERNAL FIXATION DEVICE; knee spanning (Left, 12/27/2016); PLACEMENT SPACER-ANTIBIOTIC (Left, 12/27/2016); IRRIGATION AND DEBRIDEMENT LOWER EXTREMITY; possible antibiotic bead placement; possible wound vac (Left, 12/26/2016); OPEN REDUCTION INTERNAL FIXATION-FEMUR (Left, 12/9/2016); LAPAROSCOPY-DIAGNOSTIC (N/A, 9/19/2016); EXPLORATORY-LAPAROTOMY, possible bowel resection (N/A, 9/19/2016); and LYSIS-ADHESION (N/A, 9/19/2016).  Her family history is not on file.  Adilia  reports that  has never smoked. She does not have any smokeless tobacco history on  "file. She reports that she does not drink alcohol or use drugs.  No current facility-administered medications on file prior to encounter.      Current Outpatient Medications on File Prior to Encounter   Medication Sig Dispense Refill    amlodipine (NORVASC) 10 MG tablet Take 10 mg by mouth every evening.      apixaban (ELIQUIS) 5 mg Tab Take 5 mg by mouth once daily.      atorvastatin (LIPITOR) 80 MG tablet Take 80 mg by mouth once daily.      hydrALAZINE (APRESOLINE) 50 MG tablet Take 50 mg by mouth 2 (two) times daily.      isosorbide mononitrate (IMDUR) 60 MG 24 hr tablet Take 60 mg by mouth once daily.      lisinopril (PRINIVIL,ZESTRIL) 5 MG tablet Take 5 mg by mouth once daily.      hydrocodone-acetaminophen 5-325mg (NORCO) 5-325 mg per tablet Take 2 tablets by mouth every 4 (four) hours as needed. 50 tablet 0     Aldonia is allergic to celebrex [celecoxib].    ?    Objective:   BP (!) 114/58 (Patient Position: Sitting)   Pulse 79   Temp 98.8 °F (37.1 °C) (Oral)   Resp 19   Ht 5' 4" (1.626 m)   Wt 67.9 kg (149 lb 11.1 oz)   LMP  (LMP Unknown)   SpO2 96%   Breastfeeding? No   BMI 25.69 kg/m²   Gen - NAD, A+Ox4, not confused  Gait - normal  HEENT/ NECK - EOMI/, NCAT, no JVD   CVS - RRR, no m/r/s3/s4  Resp - CTAB, no w/r/r  Abd - soft, NT, ND   Ext - edema + 2   Psych - normal affect and behavior  Neuro - no asterixis  Laboratory:  Recent Results (from the past 24 hour(s))   POCT glucose    Collection Time: 11/15/18  8:18 PM   Result Value Ref Range    POCT Glucose 108 70 - 110 mg/dL   CBC auto differential    Collection Time: 11/16/18  5:22 AM   Result Value Ref Range    WBC 6.89 3.90 - 12.70 K/uL    RBC 3.02 (L) 4.00 - 5.40 M/uL    Hemoglobin 8.8 (L) 12.0 - 16.0 g/dL    Hematocrit 29.0 (L) 37.0 - 48.5 %    MCV 96 82 - 98 fL    MCH 29.1 27.0 - 31.0 pg    MCHC 30.3 (L) 32.0 - 36.0 g/dL    RDW 14.8 (H) 11.5 - 14.5 %    Platelets 118 (L) 150 - 350 K/uL    MPV 11.2 9.2 - 12.9 fL    Gran # (ANC) 4.9 " 1.8 - 7.7 K/uL    Lymph # 1.1 1.0 - 4.8 K/uL    Mono # 0.6 0.3 - 1.0 K/uL    Eos # 0.3 0.0 - 0.5 K/uL    Baso # 0.01 0.00 - 0.20 K/uL    Gran% 71.4 38.0 - 73.0 %    Lymph% 15.7 (L) 18.0 - 48.0 %    Mono% 8.7 4.0 - 15.0 %    Eosinophil% 3.8 0.0 - 8.0 %    Basophil% 0.1 0.0 - 1.9 %    Differential Method Automated    Comprehensive Metabolic Panel (CMP)    Collection Time: 11/16/18  5:22 AM   Result Value Ref Range    Sodium 138 136 - 145 mmol/L    Potassium 5.3 (H) 3.5 - 5.1 mmol/L    Chloride 110 95 - 110 mmol/L    CO2 22 (L) 23 - 29 mmol/L    Glucose 92 70 - 110 mg/dL    BUN, Bld 36 (H) 8 - 23 mg/dL    Creatinine 2.5 (H) 0.5 - 1.4 mg/dL    Calcium 8.7 8.7 - 10.5 mg/dL    Total Protein 5.5 (L) 6.0 - 8.4 g/dL    Albumin 2.3 (L) 3.5 - 5.2 g/dL    Total Bilirubin 0.3 0.1 - 1.0 mg/dL    Alkaline Phosphatase 80 55 - 135 U/L    AST 25 10 - 40 U/L    ALT <5 (L) 10 - 44 U/L    Anion Gap 6 (L) 8 - 16 mmol/L    eGFR if African American 20 (A) >60 mL/min/1.73 m^2    eGFR if non African American 17 (A) >60 mL/min/1.73 m^2   Magnesium    Collection Time: 11/16/18  5:22 AM   Result Value Ref Range    Magnesium 1.5 (L) 1.6 - 2.6 mg/dL   Phosphorus    Collection Time: 11/16/18  5:22 AM   Result Value Ref Range    Phosphorus 2.0 (L) 2.7 - 4.5 mg/dL   POCT glucose    Collection Time: 11/16/18  6:00 AM   Result Value Ref Range    POCT Glucose 98 70 - 110 mg/dL   POCT glucose    Collection Time: 11/16/18 12:09 PM   Result Value Ref Range    POCT Glucose 122 (H) 70 - 110 mg/dL     Recent Labs   Lab 11/14/18  0448 11/15/18  0522 11/16/18  0522   WBC 7.14 7.12 6.89   HGB 10.2* 8.4* 8.8*   HCT 33.1* 27.4* 29.0*   * 118* 118*   MCV 95 95 96     Recent Labs   Lab 11/14/18  0448 11/15/18  0522 11/16/18  0522    136 138   K 5.2* 5.2* 5.3*   * 108 110   CO2 17* 22* 22*   BUN 26* 31* 36*   GLU 78 84 92   CALCIUM 9.3 8.6* 8.7   MG 1.4* 1.3* 1.5*   PHOS 3.2 2.6* 2.0*     Recent Labs   Lab 11/14/18  0448 11/15/18  0522  11/16/18  0522   PROT 5.9* 5.2* 5.5*   ALBUMIN 2.9* 2.4* 2.3*   BILITOT 0.4 0.4 0.3   AST 27 23 25   ALT 10 <5* <5*   ALKPHOS 74 68 80     Recent Labs   Lab 11/12/18  1342 11/13/18  1018   INR 1.1 1.1     Cardiac:   Recent Labs   Lab 11/13/18  1005 11/13/18  1018 11/13/18  1323 11/15/18  1026   TROPONINI 0.023 0.014 0.006  --    BNP  --   --  717* 189*     FLP:   Lab Results   Component Value Date    CHOL 129 11/29/2006    HDL 38.0 (L) 11/29/2006    LDLCALC 72.4 11/29/2006    TRIG 93 11/29/2006    CHOLHDL 29.5 11/29/2006     DM:   Lab Results   Component Value Date    HGBA1C 4.8 11/12/2018    LDLCALC 72.4 11/29/2006    CREATININE 2.5 (H) 11/16/2018     Thyroid:   Lab Results   Component Value Date    TSH 0.983 11/12/2018     Anemia: No results found for: IRON, TIBC, FERRITIN, [id number], FOLATE  Urinalysis:   Lab Results   Component Value Date    LABURIN No growth 01/13/2017    COLORU Yellow 11/14/2018    SPECGRAV 1.020 11/14/2018    NITRITE Negative 11/14/2018    KETONESU Negative 11/14/2018    UROBILINOGEN Negative 11/14/2018     Assessment:   83 to f with pmh of HFpEF, HTN, CKD 4. Patient stood up from her chair and fell on to her right side when attempting to take a step.  We were consulted in a CKD  patient stable that went to surgery. Renal function was stable after surgery we sing off. Now we were re consulted due  to SUMI. she  receive 500 ml of nss during the sumi. She was volume overload  then receive a dose of lasix. Now patient is today euvolemic. Patient with good urine out put, not more lasix. We will follow up labs     Not clear the cause of the sumi she receive days before the sumi atorvastatin that is part of her home meds and cefazolin. Not low bp, only hemoglobin decrease from 10 to 8.8 that have  Zay stable.     RIGHT HiP FRACTURE     CKD 3-4 GFR 36-30 AT BASELINE possible 2/2 htn   Cr baseline 1.8-2.0   - cr 2.5   - ua trace protein / blood trace    - UA previously year consistently negative   - p/c  0.64  HTN   HFpEF   Hyperkalemia   Plan:   FeNA   Hold ACE   Urine urea   Protein creatinine ration   Urine EOSINOPHILS   Change pantoprazole to peocid    Follow up at discharge with Dr. Bagley, [facility]   Renally dose all meds   Not lasix at this moment          Ruiz Vasques  [facility]rology PGY5    ?  ?

## 2018-11-16 NOTE — ASSESSMENT & PLAN NOTE
-recent echo at [facility] with EF down to 30-35% with repeat echo  with EF 35-40% with WMA  -1.3 liters out overnight; no input documented; negative 4.9 liters since admission  -no rales or JVD noted on exam therefore no concern for volume overload currently; if anything concerned about slight volume depletion given negative fluid balance ?accuracy and anemia   -CCB, BB and ACEI discontinued secondary to elevated creatinine and marginal BP; recommend continued holding of ACEI and CCB: would recommend resumption of low dose Toprol XL 12.5mg po daily as long as SBP greater than 90mmHg   -continued runs of NSVT (3-6 beats)noted on telemetry overnight-patient asymptomatic; likely stress induced given anemia and recent surgery but also concerning given newly depressed LVEF with WMA;  -not opposed to blood transfusion if okay with Ortho and primary team   -will proceed with ischemic evaluation at later date after recovery from hip surgery and as long as creatinine improves

## 2018-11-16 NOTE — PLAN OF CARE
Problem: Patient Care Overview  Goal: Plan of Care Review  Outcome: Ongoing (interventions implemented as appropriate)  Patient on oxygen with documented flow. Will attempt to wean per protocol.  One time neb that was ordered has been given. Clear BS. No distress.

## 2018-11-16 NOTE — PLAN OF CARE
Problem: Patient Care Overview  Goal: Plan of Care Review  Outcome: Ongoing (interventions implemented as appropriate)  Patient awake and alert. Daughter at bedside throughout shift. Cardiac diet, tolerated well. External female catheter in place putting out very little yellow urine output. Purewick changed today. Patient worked with PT/OT today. Complaints of hip pain with relief from scheduled norco. 1L/NC. Tele monitor; NSR. Blood glucose monitored with no sliding scale coverage needed. Blood pressure medications held due to low blood pressure throughout shift. Last BM 11/12/18. [place] border dressing to R hip; clean, dry, and intact. Safety maintained. Bed locked and in lowest position. Call light and personal items within reach. Fall precautions maintained.

## 2018-11-16 NOTE — PROGRESS NOTES
[facility]  Cardiology  Progress Note    Patient Name: Adilia Rosas  MRN: [id number]  Admission Date: 11/12/2018  Hospital Length of Stay: 4 days  Code Status: Full Code   Attending Physician: Oksana Sow MD   Primary Care Physician: Ely Dubois MD  Expected Discharge Date:   Principal Problem:Fracture of femur    Subjective:     Hospital Course:   11/12/2018 Presented to the ER at [facility] following fall. Xray with evidence of femur fracture and transferred to [facility] for orthopedic evaluation. Admitted to [facility] and placed on telemetry on 5th floor. Echocardiogram with moderately depressed LV function with EF 35-40%, grade I diastolic dysfunction, mild AI/MR/TR and WMA (akines to mid inferior wall and mid posterior wall). Previous echo at [facility] 10/15/2018 with LVEF 30-35% and no mention of WMA. BP elevated at 160s-180s/90s upon admission ?pain related  11/13/2018 K+ 5.5 creatinine 1.8  (baseline 1.6-1.9). BP down to 130s/70s HR 50s-60s with no arrhythmias noted on telemetry overnight. Troponin .023 and BNP pending. Cardiology consulted for preoperative clearance   11/14/2018 Underwent right hip surgery yesterday. Currently reports pain controlled with pain meds. No complaints of chest pain or SOB overnight. HR and BP stable. No arrhythmias noted on telemetry overnight. V Lasix yesterday with 3.5 liters out and negative 3.6 liters since admission-JVD improved. K+ 5.2 with BUN 26 creatinine 2.0 H&H stable at baseline   11/15/2018 PT on board and OOB with ambulation this AM. HR and BP stable this AM. Noted SBP down to 90s yesterday evening with NS bolus given. Creatinine 2.3. H&H down to 8.4 & 27.4 this AM from 10.2 & 33.1 yesterday. Monitored on telemetry with short 3 beat run of NSVT noted. Patient without complaints. Continue BB therapy and continue to monitor on telemetry   11/16/2018 H&H 8.8&29.0 today up slightly from 8.4&27.4 yesterday. K+ 5.3 this AM with creatinine 2.5  up from 2.3 yesterday and 2.0 the day prior. Recurrent episodes of NSVT (3-6beats) on telemetry. BP 90s-110s overnight-CCB, BB and ACEI discontinued per primary team         Review of Systems   Constitution: Negative for chills, decreased appetite, diaphoresis, fever and weakness.   Cardiovascular: Negative for chest pain, claudication, cyanosis, dyspnea on exertion, irregular heartbeat, leg swelling, near-syncope, orthopnea, palpitations, paroxysmal nocturnal dyspnea and syncope.   Respiratory: Negative for cough, hemoptysis, shortness of breath and wheezing.    Gastrointestinal: Negative for bloating, abdominal pain, constipation, diarrhea, melena, nausea and vomiting.   Neurological: Negative for dizziness.     Objective:     Vital Signs (Most Recent):  Temp: 99.1 °F (37.3 °C) (11/16/18 0822)  Pulse: 78 (11/16/18 0918)  Resp: 18 (11/16/18 0918)  BP: (!) 115/57 (11/16/18 0822)  SpO2: 98 % (11/16/18 0918) Vital Signs (24h Range):  Temp:  [98.3 °F (36.8 °C)-99.1 °F (37.3 °C)] 99.1 °F (37.3 °C)  Pulse:  [70-78] 78  Resp:  [16-20] 18  SpO2:  [94 %-98 %] 98 %  BP: ()/(51-61) 115/57     Weight: 67.9 kg (149 lb 11.1 oz)  Body mass index is 25.69 kg/m².     SpO2: 98 %  O2 Device (Oxygen Therapy): nasal cannula      Intake/Output Summary (Last 24 hours) at 11/16/2018 0935  Last data filed at 11/16/2018 0844  Gross per 24 hour   Intake --   Output 1416 ml   Net -1416 ml       Lines/Drains/Airways     Peripherally Inserted Central Catheter Line                 PICC Double Lumen 12/28/16 1630 right basilic 687 days          Drain            Female External Urinary Catheter 11/14/18 1325 1 day          Peripheral Intravenous Line                 Peripheral IV - Single Lumen 03/10/17 0655 Left Wrist 616 days         Peripheral IV - Single Lumen 11/13/18 1335 Left Antecubital 2 days                Physical Exam   Constitutional: She is oriented to person, place, and time. She appears well-developed and well-nourished. No  distress.   Cardiovascular: Normal rate and regular rhythm. Exam reveals no gallop.   No murmur heard.  Pulmonary/Chest: Effort normal and breath sounds normal. No respiratory distress. She has no wheezes.   Abdominal: Soft. Bowel sounds are normal. She exhibits no distension. There is no tenderness.   Neurological: She is alert and oriented to person, place, and time.   Skin: Skin is warm and dry.       Significant Labs:     Recent Labs   Lab 11/16/18  0522   WBC 6.89   RBC 3.02*   HGB 8.8*   HCT 29.0*   *   MCV 96   MCH 29.1   MCHC 30.3*     Recent Labs   Lab 11/16/18  0522   CALCIUM 8.7   PROT 5.5*      K 5.3*   CO2 22*      BUN 36*   CREATININE 2.5*   ALKPHOS 80   ALT <5*   AST 25   BILITOT 0.3       Significant Imaging: Echocardiogram:   Transthoracic echo (TTE) complete (Cupid Only):   Results for orders placed or performed during the hospital encounter of 11/12/18     · The left ventricle cavity is moderately dilated.  · Left ventricle shows mild concentric hypertrophy.  · Left ventricle ejection fraction is moderately decreased at 35-40%  · Grade I (mild) left ventricular diastolic dysfunction consistent with impaired relaxation.  · LA pressure is normal.  · Local segmental wall motion abnormalities. The mid inferior wall and mid LVPW are akinetic  · Left atrium is moderately dilated.  · RV systolic function is normal.  · Right atrium is mildly dilated.  · Mild aortic regurgitation.  · Mild mitral regurgitation.  · Mild tricuspid regurgitation.  · Pulmonic valve shows mild regurgitation.  · Elevated central venous pressure (15 mm Hg).  · The estimated PA systolic pressure is 59.09 mm Hg  · Pulmonary hypertension present.  · No pericardial effusion.    Assessment and Plan:     Brief HPI: Seen this morning on AM NP rounds with daughter at the bedside. Has not gotten OOB today but plans for PT to come after breakfast. Denies any chest pain, SOB or dizziness. Reviewed cardiac POC with patient  and daughter as detailed below-both verbalized understanding and agree with POC     Hyperkalemia    -K+ 5.3 this AM up from 5.3 yesterday   -baseline K+ 4.2-4.6  -agree with holding of ACEI for now      Femoral neck fracture    -managed by Orthopedics      Acute on chronic combined systolic and diastolic congestive heart failure, NYHA class 4    -recent echo at [facility] with EF down to 30-35% with repeat echo  with EF 35-40% with WMA  -1.3 liters out overnight; no input documented; negative 4.9 liters since admission  -no rales or JVD noted on exam therefore no concern for volume overload currently; if anything concerned about slight volume depletion given negative fluid balance ?accuracy and anemia   -CCB, BB and ACEI discontinued secondary to elevated creatinine and marginal BP; recommend continued holding of ACEI and CCB: would recommend resumption of low dose Toprol XL 12.5mg po daily as long as SBP greater than 90mmHg   -continued runs of NSVT (3-6 beats)noted on telemetry overnight-patient asymptomatic; likely stress induced given anemia and recent surgery but also concerning given newly depressed LVEF with WMA;  -not opposed to blood transfusion if okay with Ortho and primary team   -will proceed with ischemic evaluation at later date after recovery from hip surgery and as long as creatinine improves      Chronic kidney disease (CKD) stage G3b/A1, moderately decreased glomerular filtration rate (GFR) between 30-44 mL/min/1.73 square meter and albuminuria creatinine ratio less than 30 mg/g    -creatinine 2.5 this AM up slightly from 2.3-2.0-1.8 previously  -baseline creatinine 1.6-1.9  -does not appear volume overloaded; etiology ?low fluid volume related        Essential hypertension    -SBP 90s-110s overnight  -ACEI, BB and CCB discontinued per primary team  -will plan to resume low dose Toprol XL as long as BP greater than 90mmHg          VTE Risk Mitigation (From admission, onward)        Ordered      enoxaparin injection 30 mg  Daily      11/13/18 0649     IP VTE HIGH RISK PATIENT  Once      11/12/18 1723     Place sequential compression device  Until discontinued      11/12/18 1723          BETHANY Cyr, ANP  Cardiology  [facility]

## 2018-11-16 NOTE — PLAN OF CARE
Problem: Patient Care Overview  Goal: Plan of Care Review  Outcome: Ongoing (interventions implemented as appropriate)  Plan of care discussed with patient and her daughter. Patient denies any pain or nausea. Right hip incisional dressing is CDI. Female external catheter in place and functioning. Blood sugar monitored: no coverage needed. Telemetry showing normal sinus rhythm. Patient offered laxative but refused in favor of taking during day time hours. Denies any other needs at this time. Will continue to monitor.

## 2018-11-16 NOTE — PLAN OF CARE
Problem: Physical Therapy Goal  Goal: Physical Therapy Goal  Goals to be met by: 2018     Patient will increase functional independence with mobility by performin. Supine to sit with Modified [place]  2. Sit to stand transfer with Stand-by Assistance  3. Bed to chair transfer with Stand-by Assistance using Rolling Walker  4. Gait  x 50 feet with Stand-by Assistance using Rolling Walker.      Outcome: Ongoing (interventions implemented as appropriate)  Goals ongoing

## 2018-11-16 NOTE — PT/OT/SLP PROGRESS
Occupational Therapy  Visit Attempt     Patient Name:  Adilia Rosas   MRN:  [id number]    Patient not seen at this time secondary to requesting to rest following administration of pain medication  . Will follow-up as available.    Jennifer Rolon OT  11/16/2018

## 2018-11-16 NOTE — SUBJECTIVE & OBJECTIVE
Review of Systems   Constitution: Negative for chills, decreased appetite, diaphoresis, fever and weakness.   Cardiovascular: Negative for chest pain, claudication, cyanosis, dyspnea on exertion, irregular heartbeat, leg swelling, near-syncope, orthopnea, palpitations, paroxysmal nocturnal dyspnea and syncope.   Respiratory: Negative for cough, hemoptysis, shortness of breath and wheezing.    Gastrointestinal: Negative for bloating, abdominal pain, constipation, diarrhea, melena, nausea and vomiting.   Neurological: Negative for dizziness.     Objective:     Vital Signs (Most Recent):  Temp: 99.1 °F (37.3 °C) (11/16/18 0822)  Pulse: 78 (11/16/18 0918)  Resp: 18 (11/16/18 0918)  BP: (!) 115/57 (11/16/18 0822)  SpO2: 98 % (11/16/18 0918) Vital Signs (24h Range):  Temp:  [98.3 °F (36.8 °C)-99.1 °F (37.3 °C)] 99.1 °F (37.3 °C)  Pulse:  [70-78] 78  Resp:  [16-20] 18  SpO2:  [94 %-98 %] 98 %  BP: ()/(51-61) 115/57     Weight: 67.9 kg (149 lb 11.1 oz)  Body mass index is 25.69 kg/m².     SpO2: 98 %  O2 Device (Oxygen Therapy): nasal cannula      Intake/Output Summary (Last 24 hours) at 11/16/2018 0935  Last data filed at 11/16/2018 0844  Gross per 24 hour   Intake --   Output 1416 ml   Net -1416 ml       Lines/Drains/Airways     Peripherally Inserted Central Catheter Line                 PICC Double Lumen 12/28/16 1630 right basilic 687 days          Drain            Female External Urinary Catheter 11/14/18 1325 1 day          Peripheral Intravenous Line                 Peripheral IV - Single Lumen 03/10/17 0655 Left Wrist 616 days         Peripheral IV - Single Lumen 11/13/18 1335 Left Antecubital 2 days                Physical Exam   Constitutional: She is oriented to person, place, and time. She appears well-developed and well-nourished. No distress.   Cardiovascular: Normal rate and regular rhythm. Exam reveals no gallop.   No murmur heard.  Pulmonary/Chest: Effort normal and breath sounds normal. No  respiratory distress. She has no wheezes.   Abdominal: Soft. Bowel sounds are normal. She exhibits no distension. There is no tenderness.   Neurological: She is alert and oriented to person, place, and time.   Skin: Skin is warm and dry.       Significant Labs:     Recent Labs   Lab 11/16/18  0522   WBC 6.89   RBC 3.02*   HGB 8.8*   HCT 29.0*   *   MCV 96   MCH 29.1   MCHC 30.3*     Recent Labs   Lab 11/16/18  0522   CALCIUM 8.7   PROT 5.5*      K 5.3*   CO2 22*      BUN 36*   CREATININE 2.5*   ALKPHOS 80   ALT <5*   AST 25   BILITOT 0.3       Significant Imaging: Echocardiogram:   Transthoracic echo (TTE) complete (Cupid Only):   Results for orders placed or performed during the hospital encounter of 11/12/18     · The left ventricle cavity is moderately dilated.  · Left ventricle shows mild concentric hypertrophy.  · Left ventricle ejection fraction is moderately decreased at 35-40%  · Grade I (mild) left ventricular diastolic dysfunction consistent with impaired relaxation.  · LA pressure is normal.  · Local segmental wall motion abnormalities. The mid inferior wall and mid LVPW are akinetic  · Left atrium is moderately dilated.  · RV systolic function is normal.  · Right atrium is mildly dilated.  · Mild aortic regurgitation.  · Mild mitral regurgitation.  · Mild tricuspid regurgitation.  · Pulmonic valve shows mild regurgitation.  · Elevated central venous pressure (15 mm Hg).  · The estimated PA systolic pressure is 59.09 mm Hg  · Pulmonary hypertension present.  · No pericardial effusion.

## 2018-11-16 NOTE — ASSESSMENT & PLAN NOTE
-creatinine 2.5 this AM up slightly from 2.3-2.0-1.8 previously  -baseline creatinine 1.6-1.9  -does not appear volume overloaded; etiology ?low fluid volume related

## 2018-11-17 LAB
ALBUMIN SERPL BCP-MCNC: 2.4 G/DL
ALP SERPL-CCNC: 78 U/L
ALT SERPL W/O P-5'-P-CCNC: <5 U/L
ANION GAP SERPL CALC-SCNC: 6 MMOL/L
ANION GAP SERPL CALC-SCNC: 9 MMOL/L
AST SERPL-CCNC: 28 U/L
BASOPHILS # BLD AUTO: 0.02 K/UL
BASOPHILS NFR BLD: 0.3 %
BILIRUB SERPL-MCNC: 0.4 MG/DL
BUN SERPL-MCNC: 35 MG/DL
BUN SERPL-MCNC: 36 MG/DL
CALCIUM SERPL-MCNC: 9.4 MG/DL
CALCIUM SERPL-MCNC: 9.8 MG/DL
CHLORIDE SERPL-SCNC: 108 MMOL/L
CHLORIDE SERPL-SCNC: 109 MMOL/L
CO2 SERPL-SCNC: 20 MMOL/L
CO2 SERPL-SCNC: 23 MMOL/L
CREAT SERPL-MCNC: 2.2 MG/DL
CREAT SERPL-MCNC: 2.3 MG/DL
DIFFERENTIAL METHOD: ABNORMAL
EOSINOPHIL # BLD AUTO: 0.3 K/UL
EOSINOPHIL NFR BLD: 4 %
ERYTHROCYTE [DISTWIDTH] IN BLOOD BY AUTOMATED COUNT: 14.8 %
EST. GFR  (AFRICAN AMERICAN): 22 ML/MIN/1.73 M^2
EST. GFR  (AFRICAN AMERICAN): 24 ML/MIN/1.73 M^2
EST. GFR  (NON AFRICAN AMERICAN): 19 ML/MIN/1.73 M^2
EST. GFR  (NON AFRICAN AMERICAN): 20 ML/MIN/1.73 M^2
GLUCOSE SERPL-MCNC: 101 MG/DL
GLUCOSE SERPL-MCNC: 151 MG/DL
HCT VFR BLD AUTO: 28.3 %
HGB BLD-MCNC: 8.8 G/DL
LYMPHOCYTES # BLD AUTO: 1.3 K/UL
LYMPHOCYTES NFR BLD: 16.8 %
MAGNESIUM SERPL-MCNC: 2 MG/DL
MCH RBC QN AUTO: 29.5 PG
MCHC RBC AUTO-ENTMCNC: 31.1 G/DL
MCV RBC AUTO: 95 FL
MONOCYTES # BLD AUTO: 0.9 K/UL
MONOCYTES NFR BLD: 12.1 %
NEUTROPHILS # BLD AUTO: 5 K/UL
NEUTROPHILS NFR BLD: 66.5 %
PHOSPHATE SERPL-MCNC: 1.6 MG/DL
PLATELET # BLD AUTO: 117 K/UL
PMV BLD AUTO: 11.9 FL
POCT GLUCOSE: 111 MG/DL (ref 70–110)
POCT GLUCOSE: 115 MG/DL (ref 70–110)
POCT GLUCOSE: 140 MG/DL (ref 70–110)
POCT GLUCOSE: 91 MG/DL (ref 70–110)
POTASSIUM SERPL-SCNC: 5.7 MMOL/L
POTASSIUM SERPL-SCNC: 5.9 MMOL/L
PROT SERPL-MCNC: 5.8 G/DL
RBC # BLD AUTO: 2.98 M/UL
SODIUM SERPL-SCNC: 137 MMOL/L
SODIUM SERPL-SCNC: 138 MMOL/L
WBC # BLD AUTO: 7.46 K/UL

## 2018-11-17 PROCEDURE — 63600175 PHARM REV CODE 636 W HCPCS: Performed by: FAMILY MEDICINE

## 2018-11-17 PROCEDURE — 25000003 PHARM REV CODE 250: Performed by: STUDENT IN AN ORGANIZED HEALTH CARE EDUCATION/TRAINING PROGRAM

## 2018-11-17 PROCEDURE — 25000003 PHARM REV CODE 250: Performed by: FAMILY MEDICINE

## 2018-11-17 PROCEDURE — 80048 BASIC METABOLIC PNL TOTAL CA: CPT

## 2018-11-17 PROCEDURE — 63600175 PHARM REV CODE 636 W HCPCS: Performed by: STUDENT IN AN ORGANIZED HEALTH CARE EDUCATION/TRAINING PROGRAM

## 2018-11-17 PROCEDURE — 11000001 HC ACUTE MED/SURG PRIVATE ROOM

## 2018-11-17 PROCEDURE — 84100 ASSAY OF PHOSPHORUS: CPT

## 2018-11-17 PROCEDURE — 36415 COLL VENOUS BLD VENIPUNCTURE: CPT

## 2018-11-17 PROCEDURE — 85025 COMPLETE CBC W/AUTO DIFF WBC: CPT

## 2018-11-17 PROCEDURE — 94761 N-INVAS EAR/PLS OXIMETRY MLT: CPT

## 2018-11-17 PROCEDURE — 25000242 PHARM REV CODE 250 ALT 637 W/ HCPCS: Performed by: STUDENT IN AN ORGANIZED HEALTH CARE EDUCATION/TRAINING PROGRAM

## 2018-11-17 PROCEDURE — 94640 AIRWAY INHALATION TREATMENT: CPT

## 2018-11-17 PROCEDURE — 80053 COMPREHEN METABOLIC PANEL: CPT

## 2018-11-17 PROCEDURE — 83735 ASSAY OF MAGNESIUM: CPT

## 2018-11-17 PROCEDURE — 93005 ELECTROCARDIOGRAM TRACING: CPT

## 2018-11-17 PROCEDURE — 25000003 PHARM REV CODE 250: Performed by: INTERNAL MEDICINE

## 2018-11-17 PROCEDURE — 99232 SBSQ HOSP IP/OBS MODERATE 35: CPT | Mod: ,,, | Performed by: INTERNAL MEDICINE

## 2018-11-17 RX ORDER — ALBUTEROL SULFATE 2.5 MG/.5ML
10 SOLUTION RESPIRATORY (INHALATION) ONCE
Status: COMPLETED | OUTPATIENT
Start: 2018-11-17 | End: 2018-11-17

## 2018-11-17 RX ADMIN — SODIUM BICARBONATE 650 MG TABLET 1300 MG: at 09:11

## 2018-11-17 RX ADMIN — SODIUM BICARBONATE 650 MG TABLET 1300 MG: at 10:11

## 2018-11-17 RX ADMIN — ALBUTEROL SULFATE 10 MG: 2.5 SOLUTION RESPIRATORY (INHALATION) at 07:11

## 2018-11-17 RX ADMIN — DEXTROSE MONOHYDRATE 25 G: 25 INJECTION, SOLUTION INTRAVENOUS at 10:11

## 2018-11-17 RX ADMIN — FERROUS GLUCONATE 324 MG: 324 TABLET ORAL at 07:11

## 2018-11-17 RX ADMIN — SODIUM POLYSTYRENE SULFONATE 30 G: 15 SUSPENSION ORAL; RECTAL at 09:11

## 2018-11-17 RX ADMIN — CALCIUM GLUCONATE 1 G: 98 INJECTION, SOLUTION INTRAVENOUS at 10:11

## 2018-11-17 RX ADMIN — INSULIN HUMAN 10 UNITS: 100 INJECTION, SOLUTION PARENTERAL at 10:11

## 2018-11-17 RX ADMIN — VITAMIN D, TAB 1000IU (100/BT) 2000 UNITS: 25 TAB at 11:11

## 2018-11-17 RX ADMIN — LUBIPROSTONE 24 MCG: 24 CAPSULE, GELATIN COATED ORAL at 10:11

## 2018-11-17 RX ADMIN — METOPROLOL SUCCINATE 25 MG: 25 TABLET, EXTENDED RELEASE ORAL at 10:11

## 2018-11-17 RX ADMIN — LUBIPROSTONE 24 MCG: 24 CAPSULE, GELATIN COATED ORAL at 05:11

## 2018-11-17 RX ADMIN — ENOXAPARIN SODIUM 30 MG: 100 INJECTION SUBCUTANEOUS at 05:11

## 2018-11-17 RX ADMIN — HYDROCODONE BITARTRATE AND ACETAMINOPHEN 1 TABLET: 10; 325 TABLET ORAL at 01:11

## 2018-11-17 RX ADMIN — HYDROCODONE BITARTRATE AND ACETAMINOPHEN 1 TABLET: 10; 325 TABLET ORAL at 05:11

## 2018-11-17 RX ADMIN — OXYCODONE HYDROCHLORIDE AND ACETAMINOPHEN 500 MG: 500 TABLET ORAL at 10:11

## 2018-11-17 RX ADMIN — STANDARDIZED SENNA CONCENTRATE AND DOCUSATE SODIUM 1 TABLET: 8.6; 5 TABLET, FILM COATED ORAL at 09:11

## 2018-11-17 RX ADMIN — ATORVASTATIN CALCIUM 80 MG: 40 TABLET, FILM COATED ORAL at 10:11

## 2018-11-17 RX ADMIN — FAMOTIDINE 20 MG: 20 TABLET ORAL at 09:11

## 2018-11-17 NOTE — PROGRESS NOTES
Progress Note            [facility]    Subjective: Patient is doing well with no acute concerns. She denies chest pain, dyspnea, orthopnea or PND.         Review of patient's allergies indicates:   Allergen Reactions    Celebrex [celecoxib] Other (See Comments)     Pain down her spine          ascorbic acid (vitamin C)  500 mg Oral Daily    atorvastatin  80 mg Oral Daily    calcium gluconate IVPB  1 g Intravenous Once    dextrose 50%  25 g Intravenous Once    enoxaparin  30 mg Subcutaneous Daily    famotidine  20 mg Oral Daily    ferrous gluconate  324 mg Oral Daily with breakfast    furosemide  20 mg Intravenous Once    HYDROcodone-acetaminophen  1 tablet Oral Q6H    insulin regular  10 Units Intravenous Once    lubiprostone  24 mcg Oral BID WM    metoprolol succinate  25 mg Oral Daily    mirtazapine  15 mg Oral QHS    senna-docusate 8.6-50 mg  1 tablet Oral Daily    sodium bicarbonate  1,300 mg Oral BID    vitamin D  2,000 Units Oral Daily           albuterol, bisacodyl, dextrose 50%, dextrose 50%, glucagon (human recombinant), glucose, glucose, morphine, ondansetron, sodium chloride 0.9%    Vitals:    11/17/18 0453 11/17/18 0728 11/17/18 0807 11/17/18 0900   BP:   (!) 118/56    BP Location:       Patient Position:   Sitting    Pulse: 70 62 77 75   Resp:  18 20    Temp:   98.3 °F (36.8 °C)    TempSrc:   Oral    SpO2:  (!) 94%     Weight:       Height:           Physical Examination:  General Appearance: No acute distress  Mental: Alert to person, time and place  HEENT: Perrl  Chest: No pain with palpitations, no chest deformities  Heart: RRR, no murmurs, no gallops or rubs  Lung: CTAB  ABDOMEN: Soft, nontender, nondistended with good bowel sounds heard. No mass or hepatosplenomegaly  EXTREMITIES: Without cyanosis, clubbing or edema.   NEUROLOGICAL: Gross nonfocal. Skin: Warm and dry without any rash. There is no costovertebral angle tenderness.   Skin: no rashes  lesions or ulcers    Lab Results   Component Value Date     11/17/2018    K 5.9 (H) 11/17/2018     11/17/2018    CO2 23 11/17/2018    BUN 35 (H) 11/17/2018    CREATININE 2.2 (H) 11/17/2018     11/17/2018    HGBA1C 4.8 11/12/2018    MG 2.0 11/17/2018    AST 28 11/17/2018    ALT <5 (L) 11/17/2018    ALBUMIN 2.4 (L) 11/17/2018    PROT 5.8 (L) 11/17/2018    BILITOT 0.4 11/17/2018    WBC 7.46 11/17/2018    HGB 8.8 (L) 11/17/2018    HCT 28.3 (L) 11/17/2018    MCV 95 11/17/2018     (L) 11/17/2018    INR 1.1 11/13/2018    INR 2.0 (H) 03/07/2007    TSH 0.983 11/12/2018    CHOL 129 11/29/2006    HDL 38.0 (L) 11/29/2006    LDLCALC 72.4 11/29/2006    TRIG 93 11/29/2006       No results for input(s): CPK, CPKMB, TROPONINI, MB in the last 24 hours.    Recent Labs   Lab 11/13/18  1323   TROPONINI 0.006         Lab Results   Component Value Date    TSH 0.983 11/12/2018       Lab Results   Component Value Date    HGBA1C 4.8 11/12/2018           Images and labs reviewed        Assessment/Plan  1. CHF- chronic and stable. Patient Euvolemic  2. Acute on chronic renal failure- improving  3. Right Hip fracture- s/p Hemiarthroplasty of Right Hip  4. HTN- controlled.     Cardiology signing off      Nakash Grant MD [facility]

## 2018-11-17 NOTE — PROGRESS NOTES
[facility]  Nephrology  Progress Note    Patient Name: Adilia Rosas  MRN: [id number]  Admission Date: 11/12/2018  Hospital Length of Stay: 5 days  Attending Provider: Oksana Sow MD   Primary Care Physician: Ely Dubois MD  Principal Problem:Fracture of femur    Subjective:     HPI: No notes on file    Interval History:  Pt. Stable     Review of patient's allergies indicates:   Allergen Reactions    Celebrex [celecoxib] Other (See Comments)     Pain down her spine     Current Facility-Administered Medications   Medication Frequency    albuterol inhaler 2 puff Q4H PRN    ascorbic acid (vitamin C) tablet 500 mg Daily    atorvastatin tablet 80 mg Daily    bisacodyl suppository 10 mg Daily PRN    dextrose 50% injection 12.5 g PRN    dextrose 50% injection 25 g PRN    enoxaparin injection 30 mg Daily    famotidine tablet 20 mg Daily    ferrous gluconate Tab 324 mg Daily with breakfast    furosemide injection 20 mg Once    glucagon (human recombinant) injection 1 mg PRN    glucose chewable tablet 16 g PRN    glucose chewable tablet 24 g PRN    HYDROcodone-acetaminophen  mg per tablet 1 tablet Q6H    lubiprostone capsule 24 mcg BID WM    metoprolol succinate (TOPROL-XL) 24 hr tablet 25 mg Daily    mirtazapine tablet 15 mg QHS    morphine injection 2 mg Q4H PRN    ondansetron disintegrating tablet 8 mg Q8H PRN    senna-docusate 8.6-50 mg per tablet 1 tablet Daily    sodium bicarbonate tablet 1,300 mg BID    sodium chloride 0.9% flush 5 mL PRN    vitamin D 1000 units tablet 2,000 Units Daily       Objective:     Vital Signs (Most Recent):  Temp: 98.9 °F (37.2 °C) (11/17/18 1220)  Pulse: 75 (11/17/18 1234)  Resp: 18 (11/17/18 1220)  BP: 121/81 (11/17/18 1220)  SpO2: 95 % (11/17/18 1141)  O2 Device (Oxygen Therapy): room air (11/17/18 1141) Vital Signs (24h Range):  Temp:  [98.3 °F (36.8 °C)-99.2 °F (37.3 °C)] 98.9 °F (37.2 °C)  Pulse:  [62-85] 75  Resp:  [18-20]  18  SpO2:  [93 %-96 %] 95 %  BP: (107-158)/(56-81) 121/81     Weight: 67.8 kg (149 lb 7.6 oz) (11/17/18 0407)  Body mass index is 25.66 kg/m².  Body surface area is 1.75 meters squared.    I/O last 3 completed shifts:  In: -   Out: 1854 [Urine:1854]    Physical Exam   Constitutional: She is oriented to person, place, and time. She appears well-developed and well-nourished.   HENT:   Head: Normocephalic and atraumatic.   Cardiovascular: Normal rate and regular rhythm.   Pulmonary/Chest: Effort normal and breath sounds normal.   Abdominal: Soft. Bowel sounds are normal.   Neurological: She is alert and oriented to person, place, and time.   Skin: Skin is warm and dry.   Psychiatric: She has a normal mood and affect. Her behavior is normal. Thought content normal.   Vitals reviewed.      Significant Labs:  Cardiac Markers: No results for input(s): CKMB, TROPONINT, MYOGLOBIN in the last 168 hours.  CBC:   Recent Labs   Lab 11/17/18  0453   WBC 7.46   RBC 2.98*   HGB 8.8*   HCT 28.3*   *   MCV 95   MCH 29.5   MCHC 31.1*     CMP:   Recent Labs   Lab 11/17/18  0454 11/17/18  1218    151*   CALCIUM 9.4 9.8   ALBUMIN 2.4*  --    PROT 5.8*  --     138   K 5.9* 5.7*   CO2 23 20*    109   BUN 35* 36*   CREATININE 2.2* 2.3*   ALKPHOS 78  --    ALT <5*  --    AST 28  --    BILITOT 0.4  --         Significant Imaging:  Labs: Reviewed  X-Ray: Reviewed    Assessment/Plan:     Hyperkalemia    Would manage medically and shift  With oral kayexelate.     Chronic kidney disease (CKD) stage G3b/A1, moderately decreased glomerular filtration rate (GFR) between 30-44 mL/min/1.73 square meter and albuminuria creatinine ratio less than 30 mg/g    Pt. Stable,labs reviewed, and creat. Overall stable.         Thank you for your consult. I will follow-up with patient. Please contact us if you have any additional questions.    Yudi Dorado MD  Nephrology  [facility]

## 2018-11-17 NOTE — PLAN OF CARE
Problem: Patient Care Overview  Goal: Plan of Care Review  Outcome: Ongoing (interventions implemented as appropriate)  Plan of care reviewed with patient and daughter, understanding verbalized. No complaints overnight. Family remains at bedside. Instructed to call for any assistance, understanding verbalized.. Bed alarm on, call light in reach, fall precautions in place. Will continue to monitor.

## 2018-11-17 NOTE — PROGRESS NOTES
Progress Note                     [facility]   Admit Date: 11/12/2018   LOS: 5 days     SUBJECTIVE:     Subjective: Patient feels well and is with daughter this am. She is tolerating diet well and is on room air with no SOB. She walked to the door and back yesterday with PT/OT and will work with them today. Elevated K this am and will get treatment for it.         Scheduled Meds:   ascorbic acid (vitamin C)  500 mg Oral Daily    atorvastatin  80 mg Oral Daily    calcium gluconate IVPB  1 g Intravenous Once    dextrose 50%  25 g Intravenous Once    enoxaparin  30 mg Subcutaneous Daily    famotidine  20 mg Oral Daily    ferrous gluconate  324 mg Oral Daily with breakfast    furosemide  20 mg Intravenous Once    HYDROcodone-acetaminophen  1 tablet Oral Q6H    insulin regular  10 Units Intravenous Once    lubiprostone  24 mcg Oral BID WM    metoprolol succinate  25 mg Oral Daily    mirtazapine  15 mg Oral QHS    senna-docusate 8.6-50 mg  1 tablet Oral Daily    sodium bicarbonate  1,300 mg Oral BID    vitamin D  2,000 Units Oral Daily     Continuous Infusions:  PRN Meds:albuterol, bisacodyl, dextrose 50%, dextrose 50%, glucagon (human recombinant), glucose, glucose, morphine, ondansetron, sodium chloride 0.9%    Review of patient's allergies indicates:   Allergen Reactions    Celebrex [celecoxib] Other (See Comments)     Pain down her spine       Review of Systems  See subjective    OBJECTIVE:     Vital Signs (Most Recent)  Temp: 98.3 °F (36.8 °C) (11/17/18 0807)  Pulse: 77 (11/17/18 0807)  Resp: 20 (11/17/18 0807)  BP: (!) 118/56 (11/17/18 0807)  SpO2: (!) 94 % (11/17/18 0728)    Vital Signs Range (Last 24H):  Temp:  [98.3 °F (36.8 °C)-99.2 °F (37.3 °C)]   Pulse:  [62-85]   Resp:  [18-20]   BP: (107-158)/(56-77)   SpO2:  [93 %-98 %]     I & O (Last 24H):    Intake/Output Summary (Last 24 hours) at 11/17/2018 0917  Last data filed at 11/17/2018 0407  Gross per 24 hour   Intake --   Output  1054 ml   Net -1054 ml     Wt Readings from Last 3 Encounters:   11/17/18 67.8 kg (149 lb 7.6 oz)   11/12/18 76.7 kg (169 lb)   11/02/18 72.5 kg (159 lb 14.4 oz)     Physical Exam:  Physical Exam   Constitutional: She is oriented to person, place, and time. She appears well-developed and well-nourished.   HENT:   Head: Normocephalic and atraumatic.   Eyes: Conjunctivae and EOM are normal. Pupils are equal, round, and reactive to light.   Neck: Normal range of motion. Neck supple.   Cardiovascular: Normal rate. Exam reveals s4.  Pulmonary/Chest: Effort normal and breath sounds normal. Slight wheezing at the bases rosalinda.  Abdominal: Soft. Bowel sounds are normal.   Musculoskeletal: She exhibits tenderness and deformity. She exhibits +1 pitting edema rosalinda.   Right leg packed and no sign of acute bleeding, moving toes well.  R forearm slight bleeding from IV site.  Neurological: She is alert and oriented to person, place, and time.   Skin: Skin is warm and dry. Capillary refill takes less than 2 seconds.   Psychiatric: She has a normal mood and affect.         Laboratory:  LABS  CBC  Recent Labs   Lab 11/15/18  0522 11/16/18  0522 11/17/18  0453   WBC 7.12 6.89 7.46   RBC 2.90* 3.02* 2.98*   HGB 8.4* 8.8* 8.8*   HCT 27.4* 29.0* 28.3*   * 118* 117*   MCV 95 96 95   MCH 29.0 29.1 29.5   MCHC 30.7* 30.3* 31.1*     BMP  Recent Labs   Lab 11/15/18  0522 11/16/18  0522 11/17/18  0454    138 137   K 5.2* 5.3* 5.9*   CO2 22* 22* 23    110 108   BUN 31* 36* 35*   CREATININE 2.3* 2.5* 2.2*   GLU 84 92 101       POCT-Glucose  POCT Glucose   Date Value Ref Range Status   11/17/2018 91 70 - 110 mg/dL Final   11/16/2018 116 (H) 70 - 110 mg/dL Final   11/16/2018 126 (H) 70 - 110 mg/dL Final   11/16/2018 122 (H) 70 - 110 mg/dL Final   11/16/2018 98 70 - 110 mg/dL Final   11/15/2018 108 70 - 110 mg/dL Final   11/15/2018 109 70 - 110 mg/dL Final   11/15/2018 109 70 - 110 mg/dL Final   11/15/2018 85 70 - 110 mg/dL Final    11/14/2018 116 (H) 70 - 110 mg/dL Final   11/14/2018 103 70 - 110 mg/dL Final   11/14/2018 87 70 - 110 mg/dL Final       Recent Labs   Lab 11/15/18  0522 11/16/18  0522 11/17/18  0454   CALCIUM 8.6* 8.7 9.4   MG 1.3* 1.5* 2.0   PHOS 2.6* 2.0* 1.6*     LFT  Recent Labs   Lab 11/15/18  0522 11/16/18  0522 11/17/18  0454   PROT 5.2* 5.5* 5.8*   ALBUMIN 2.4* 2.3* 2.4*   BILITOT 0.4 0.3 0.4   AST 23 25 28   ALKPHOS 68 80 78   ALT <5* <5* <5*         COAGS  Recent Labs   Lab 11/12/18  1342 11/13/18  1018   INR 1.1 1.1   APTT 32.2* 29.9     CE  Recent Labs   Lab 11/13/18  1005 11/13/18  1018 11/13/18  1323   TROPONINI 0.023 0.014 0.006     ABGs  No results for input(s): PH, PCO2, PO2, HCO3, POCSATURATED, BE in the last 24 hours.  BNP  Recent Labs   Lab 11/13/18  1323 11/15/18  1026   * 189*     UA  No results for input(s): COLORU, CLARITYU, SPECGRAV, PHUR, PROTEINUA, GLUCOSEU, BLOODU, WBCU, RBCU, BACTERIA, MUCUS in the last 24 hours.    Invalid input(s):  BILIRUBINCON  LAST HbA1c  Lab Results   Component Value Date    HGBA1C 4.8 11/12/2018         Diagnostic Results:  Recent Labs     11/15/18  0522 11/16/18  0522 11/17/18  0453   WBC 7.12 6.89 7.46   HGB 8.4* 8.8* 8.8*   HCT 27.4* 29.0* 28.3*   * 118* 117*   MCV 95 96 95   RDW 14.8* 14.8* 14.8*       Recent Labs     11/15/18  0522 11/16/18  0522 11/17/18  0454    138 137   K 5.2* 5.3* 5.9*    110 108   CO2 22* 22* 23   GLU 84 92 101   BUN 31* 36* 35*   CREATININE 2.3* 2.5* 2.2*   CALCIUM 8.6* 8.7 9.4   PROT 5.2* 5.5* 5.8*   ALBUMIN 2.4* 2.3* 2.4*   BILITOT 0.4 0.3 0.4   ALKPHOS 68 80 78   AST 23 25 28   ALT <5* <5* <5*   ANIONGAP 6* 6* 6*   ESTGFRAFRICA 22* 20* 24*   EGFRNONAA 19* 17* 20*       Recent Labs     11/15/18  0522 11/16/18  0522 11/17/18  0454   MG 1.3* 1.5* 2.0   PHOS 2.6* 2.0* 1.6*       Coags  Lab Results   Component Value Date    INR 1.1 11/13/2018    INR 1.1 11/12/2018    INR 1.1 01/06/2017    APTT 29.9 11/13/2018    APTT 32.2  (H) 11/12/2018    APTT 33.8 (H) 01/06/2017     Recent Labs   Lab 11/12/18  1342 11/13/18  1018   INR 1.1 1.1   APTT 32.2* 29.9       A1c:   Lab Results   Component Value Date    HGBA1C 4.8 11/12/2018   , Last Gluc:   Recent Labs   Lab 11/15/18  2018 11/16/18  0600 11/16/18  1209 11/16/18  1633 11/16/18  1949 11/17/18  0559   POCTGLUCOSE 108 98 122* 126* 116* 91       TSH:   Lab Results   Component Value Date    TSH 0.983 11/12/2018         Cardiac Enzymes  No results for input(s): TROPONINI, CPKMB, CPK in the last 72 hours.      Urinalysis  Urinalysis  No results for input(s): COLORU, CLARITYU, SPECGRAV, PHUR, PROTEINUA, GLUCOSEU, BILIRUBINCON, BLOODU, WBCU, RBCU, BACTERIA, MUCUS, NITRITE, LEUKOCYTESUR, UROBILINOGEN, HYALINECASTS in the last 24 hours.  No results for input(s): COLORU, CLARITYU, SPECGRAV, PHUR, PROTEINUA, GLUCOSEU, BLOODU, WBCU, RBCU, BACTERIA, MUCUS in the last 24 hours.    Invalid input(s):  BILIRUBINCON    Micro  Microbiology Results (last 7 days)     ** No results found for the last 168 hours. **             ABG  No results for input(s): PH, PCO2, PO2, HCO3, POCSATURATED, BE in the last 168 hours.      Imaging   Imaging Results    None         IP CONSULT TO NEPHROLOGY-[facility]  IP CONSULT TO ORTHOPEDIC SURGERY  IP CONSULT TO CARDIOLOGY-[facility]  IP CONSULT TO ANESTHESIOLOGY  IP CONSULT TO NEPHROLOGY-[facility]    ASSESSMENT/PLAN:   Right femoral neck fracture   -Diagnosed by xray   -Right Leg externally rotated on exam   -No notable neurologic deficits   -Popliteal and dorsalis pedis pulses intact  -Orthopedics consulted, s/p Hemiarthroplasty of Right Hip  -Continue Pain control as tolerated   working with pt/ot and did well yesterday  PT OT today      Heart Failure with preserved EF   -Previous TTE shows diastolic dysfunction. EF 55-60%. (9/2016)   -Will repeat TTE today.   -EKG today sinus  - consulted cards, f/u recs, cleared by Dr. Curiel before the surgery  - ischemic evaluation at later date after  recovery     HTN  -Home regimen: Hydralizine 50mg PO BID, Isosorbide Mono-nitrate 60mg PO qHs, Lisinopril 5mg PO daily, Carvedilol 6.25mg PO BID, Amlodipine 10mg PO daily. Eliquis 10mg PO BID and Atorvastatin 80mg PO BID  -Will adjust accordingly following assement of vitals and fluid status  -BP WNL this am       CKD Grade 4  GFR 26   CR bump, will trend  Will continue to monitor  Will consult nephrology - Preparation for future dialysis?? not during this admission.     GERD  -Continue PPI      Arthritis   morphin 2mg prn q2hr, and norco 10 scheduled will give before pt/ot        Hyperkalemia  K 5.9  Albuterol, calcium, insulin and dextrose given this am   Holding ACE for how   Bmp at noon       PPx: SCDs /PENELOPE hose  Code: Full     Dc'd IVF per combined HF, pt tolerates po. Ekg sinus serjio,  Cxr, INR, PT/PTT, trops, bnp slightly higher s/p 1dose lasix, H/H stable, 2DE combined HF 35~40 with DD.      Dispo:   Working w/ PT/OT s/p Hemiarthroplasty of Right Hip, F/U with cxr, still on O2, will CXR and BNP, and + for Fluid, will gentle diuresing, and wean off O2.    Paulo Hensley MD

## 2018-11-17 NOTE — SUBJECTIVE & OBJECTIVE
Interval History:  Pt. Stable     Review of patient's allergies indicates:   Allergen Reactions    Celebrex [celecoxib] Other (See Comments)     Pain down her spine     Current Facility-Administered Medications   Medication Frequency    albuterol inhaler 2 puff Q4H PRN    ascorbic acid (vitamin C) tablet 500 mg Daily    atorvastatin tablet 80 mg Daily    bisacodyl suppository 10 mg Daily PRN    dextrose 50% injection 12.5 g PRN    dextrose 50% injection 25 g PRN    enoxaparin injection 30 mg Daily    famotidine tablet 20 mg Daily    ferrous gluconate Tab 324 mg Daily with breakfast    furosemide injection 20 mg Once    glucagon (human recombinant) injection 1 mg PRN    glucose chewable tablet 16 g PRN    glucose chewable tablet 24 g PRN    HYDROcodone-acetaminophen  mg per tablet 1 tablet Q6H    lubiprostone capsule 24 mcg BID WM    metoprolol succinate (TOPROL-XL) 24 hr tablet 25 mg Daily    mirtazapine tablet 15 mg QHS    morphine injection 2 mg Q4H PRN    ondansetron disintegrating tablet 8 mg Q8H PRN    senna-docusate 8.6-50 mg per tablet 1 tablet Daily    sodium bicarbonate tablet 1,300 mg BID    sodium chloride 0.9% flush 5 mL PRN    vitamin D 1000 units tablet 2,000 Units Daily       Objective:     Vital Signs (Most Recent):  Temp: 98.9 °F (37.2 °C) (11/17/18 1220)  Pulse: 75 (11/17/18 1234)  Resp: 18 (11/17/18 1220)  BP: 121/81 (11/17/18 1220)  SpO2: 95 % (11/17/18 1141)  O2 Device (Oxygen Therapy): room air (11/17/18 1141) Vital Signs (24h Range):  Temp:  [98.3 °F (36.8 °C)-99.2 °F (37.3 °C)] 98.9 °F (37.2 °C)  Pulse:  [62-85] 75  Resp:  [18-20] 18  SpO2:  [93 %-96 %] 95 %  BP: (107-158)/(56-81) 121/81     Weight: 67.8 kg (149 lb 7.6 oz) (11/17/18 0407)  Body mass index is 25.66 kg/m².  Body surface area is 1.75 meters squared.    I/O last 3 completed shifts:  In: -   Out: 1854 [Urine:1854]    Physical Exam   Constitutional: She is oriented to person, place, and time. She  appears well-developed and well-nourished.   HENT:   Head: Normocephalic and atraumatic.   Cardiovascular: Normal rate and regular rhythm.   Pulmonary/Chest: Effort normal and breath sounds normal.   Abdominal: Soft. Bowel sounds are normal.   Neurological: She is alert and oriented to person, place, and time.   Skin: Skin is warm and dry.   Psychiatric: She has a normal mood and affect. Her behavior is normal. Thought content normal.   Vitals reviewed.      Significant Labs:  Cardiac Markers: No results for input(s): CKMB, TROPONINT, MYOGLOBIN in the last 168 hours.  CBC:   Recent Labs   Lab 11/17/18  0453   WBC 7.46   RBC 2.98*   HGB 8.8*   HCT 28.3*   *   MCV 95   MCH 29.5   MCHC 31.1*     CMP:   Recent Labs   Lab 11/17/18  0454 11/17/18  1218    151*   CALCIUM 9.4 9.8   ALBUMIN 2.4*  --    PROT 5.8*  --     138   K 5.9* 5.7*   CO2 23 20*    109   BUN 35* 36*   CREATININE 2.2* 2.3*   ALKPHOS 78  --    ALT <5*  --    AST 28  --    BILITOT 0.4  --         Significant Imaging:  Labs: Reviewed  X-Ray: Reviewed

## 2018-11-17 NOTE — PT/OT/SLP PROGRESS
Physical Therapy      Patient Name:  Adilia Rosas   MRN:  [id number]    Patient not seen today secondary to Unavailable (Comment), Nursing care. 2 attempts to treat pt 11:15 AM, then 11:35 AM. TARSHA.     Sam Smith PTA

## 2018-11-17 NOTE — PROGRESS NOTES
Pharmacist Renal Dose Adjustment Note    Adilia Rosas is a 83 y.o. female being treated with the medication famotidine    Patient Data:    Vital Signs (Most Recent):  Temp: 98.3 °F (36.8 °C) (11/16/18 1630)  Pulse: 85 (11/16/18 1630)  Resp: 19 (11/16/18 1630)  BP: (!) 158/77 (11/16/18 1630)  SpO2: 96 % (11/16/18 1619)   Vital Signs (72h Range):  Temp:  [94.3 °F (34.6 °C)-99.4 °F (37.4 °C)]   Pulse:  []   Resp:  [12-50]   BP: ()/(51-81)   SpO2:  [90 %-98 %]      Recent Labs   Lab 11/14/18  0448 11/15/18  0522 11/16/18  0522   CREATININE 2.0* 2.3* 2.5*     Serum creatinine: 2.5 mg/dL (H) 11/16/18 0522  Estimated creatinine clearance: 16.7 mL/min (A)    Medication:famotidine  dose: 20mg frequency bid will be changed to medication:famotidine dose:20mg frequency:daily    Pharmacist's Name: Reina Plata  Pharmacist's Extension: [phone]

## 2018-11-18 LAB
ALBUMIN SERPL BCP-MCNC: 2.3 G/DL
ALP SERPL-CCNC: 77 U/L
ALT SERPL W/O P-5'-P-CCNC: 5 U/L
ANION GAP SERPL CALC-SCNC: 6 MMOL/L
ANION GAP SERPL CALC-SCNC: 6 MMOL/L
AST SERPL-CCNC: 31 U/L
BASOPHILS # BLD AUTO: 0 K/UL
BASOPHILS NFR BLD: 0 %
BILIRUB SERPL-MCNC: 0.5 MG/DL
BUN SERPL-MCNC: 35 MG/DL
BUN SERPL-MCNC: 36 MG/DL
CALCIUM SERPL-MCNC: 10.1 MG/DL
CALCIUM SERPL-MCNC: 10.7 MG/DL
CHLORIDE SERPL-SCNC: 106 MMOL/L
CHLORIDE SERPL-SCNC: 108 MMOL/L
CO2 SERPL-SCNC: 25 MMOL/L
CO2 SERPL-SCNC: 26 MMOL/L
CREAT SERPL-MCNC: 2.1 MG/DL
CREAT SERPL-MCNC: 2.1 MG/DL
DIFFERENTIAL METHOD: ABNORMAL
EOSINOPHIL # BLD AUTO: 0.3 K/UL
EOSINOPHIL NFR BLD: 5 %
ERYTHROCYTE [DISTWIDTH] IN BLOOD BY AUTOMATED COUNT: 14.8 %
EST. GFR  (AFRICAN AMERICAN): 25 ML/MIN/1.73 M^2
EST. GFR  (AFRICAN AMERICAN): 25 ML/MIN/1.73 M^2
EST. GFR  (NON AFRICAN AMERICAN): 22 ML/MIN/1.73 M^2
EST. GFR  (NON AFRICAN AMERICAN): 22 ML/MIN/1.73 M^2
GLUCOSE SERPL-MCNC: 120 MG/DL
GLUCOSE SERPL-MCNC: 96 MG/DL
HCT VFR BLD AUTO: 27 %
HGB BLD-MCNC: 8.1 G/DL
LYMPHOCYTES # BLD AUTO: 1.4 K/UL
LYMPHOCYTES NFR BLD: 21.9 %
MAGNESIUM SERPL-MCNC: 2 MG/DL
MCH RBC QN AUTO: 28.5 PG
MCHC RBC AUTO-ENTMCNC: 30 G/DL
MCV RBC AUTO: 95 FL
MONOCYTES # BLD AUTO: 0.8 K/UL
MONOCYTES NFR BLD: 11.8 %
NEUTROPHILS # BLD AUTO: 4 K/UL
NEUTROPHILS NFR BLD: 61 %
PHOSPHATE SERPL-MCNC: 2.3 MG/DL
PLATELET # BLD AUTO: 178 K/UL
PMV BLD AUTO: 10.2 FL
POCT GLUCOSE: 105 MG/DL (ref 70–110)
POCT GLUCOSE: 107 MG/DL (ref 70–110)
POCT GLUCOSE: 108 MG/DL (ref 70–110)
POCT GLUCOSE: 92 MG/DL (ref 70–110)
POTASSIUM SERPL-SCNC: 5.3 MMOL/L
POTASSIUM SERPL-SCNC: 5.8 MMOL/L
PROT SERPL-MCNC: 5.7 G/DL
RBC # BLD AUTO: 2.84 M/UL
SODIUM SERPL-SCNC: 138 MMOL/L
SODIUM SERPL-SCNC: 139 MMOL/L
WBC # BLD AUTO: 6.54 K/UL

## 2018-11-18 PROCEDURE — 63600175 PHARM REV CODE 636 W HCPCS: Performed by: FAMILY MEDICINE

## 2018-11-18 PROCEDURE — 25000003 PHARM REV CODE 250: Performed by: STUDENT IN AN ORGANIZED HEALTH CARE EDUCATION/TRAINING PROGRAM

## 2018-11-18 PROCEDURE — 94640 AIRWAY INHALATION TREATMENT: CPT

## 2018-11-18 PROCEDURE — 97116 GAIT TRAINING THERAPY: CPT

## 2018-11-18 PROCEDURE — 25000003 PHARM REV CODE 250: Performed by: FAMILY MEDICINE

## 2018-11-18 PROCEDURE — 11000001 HC ACUTE MED/SURG PRIVATE ROOM

## 2018-11-18 PROCEDURE — 80048 BASIC METABOLIC PNL TOTAL CA: CPT

## 2018-11-18 PROCEDURE — 85025 COMPLETE CBC W/AUTO DIFF WBC: CPT

## 2018-11-18 PROCEDURE — 83735 ASSAY OF MAGNESIUM: CPT

## 2018-11-18 PROCEDURE — 84100 ASSAY OF PHOSPHORUS: CPT

## 2018-11-18 PROCEDURE — G8980 MOBILITY D/C STATUS: HCPCS | Mod: CK

## 2018-11-18 PROCEDURE — 97530 THERAPEUTIC ACTIVITIES: CPT

## 2018-11-18 PROCEDURE — 25000242 PHARM REV CODE 250 ALT 637 W/ HCPCS: Performed by: STUDENT IN AN ORGANIZED HEALTH CARE EDUCATION/TRAINING PROGRAM

## 2018-11-18 PROCEDURE — 94761 N-INVAS EAR/PLS OXIMETRY MLT: CPT

## 2018-11-18 PROCEDURE — 80053 COMPREHEN METABOLIC PANEL: CPT

## 2018-11-18 PROCEDURE — 36415 COLL VENOUS BLD VENIPUNCTURE: CPT

## 2018-11-18 PROCEDURE — 25000003 PHARM REV CODE 250: Performed by: INTERNAL MEDICINE

## 2018-11-18 RX ORDER — ALBUTEROL SULFATE 2.5 MG/.5ML
5 SOLUTION RESPIRATORY (INHALATION) ONCE
Status: COMPLETED | OUTPATIENT
Start: 2018-11-18 | End: 2018-11-18

## 2018-11-18 RX ORDER — SODIUM,POTASSIUM PHOSPHATES 280-250MG
1 POWDER IN PACKET (EA) ORAL
Status: COMPLETED | OUTPATIENT
Start: 2018-11-18 | End: 2018-11-18

## 2018-11-18 RX ORDER — LISINOPRIL 5 MG/1
5 TABLET ORAL DAILY
Status: DISCONTINUED | OUTPATIENT
Start: 2018-11-19 | End: 2018-11-19 | Stop reason: HOSPADM

## 2018-11-18 RX ADMIN — OXYCODONE HYDROCHLORIDE AND ACETAMINOPHEN 500 MG: 500 TABLET ORAL at 09:11

## 2018-11-18 RX ADMIN — ENOXAPARIN SODIUM 30 MG: 100 INJECTION SUBCUTANEOUS at 05:11

## 2018-11-18 RX ADMIN — LUBIPROSTONE 24 MCG: 24 CAPSULE, GELATIN COATED ORAL at 05:11

## 2018-11-18 RX ADMIN — STANDARDIZED SENNA CONCENTRATE AND DOCUSATE SODIUM 1 TABLET: 8.6; 5 TABLET, FILM COATED ORAL at 09:11

## 2018-11-18 RX ADMIN — HYDROCODONE BITARTRATE AND ACETAMINOPHEN 1 TABLET: 10; 325 TABLET ORAL at 11:11

## 2018-11-18 RX ADMIN — VITAMIN D, TAB 1000IU (100/BT) 2000 UNITS: 25 TAB at 09:11

## 2018-11-18 RX ADMIN — POTASSIUM & SODIUM PHOSPHATES POWDER PACK 280-160-250 MG 1 PACKET: 280-160-250 PACK at 01:11

## 2018-11-18 RX ADMIN — POTASSIUM & SODIUM PHOSPHATES POWDER PACK 280-160-250 MG 1 PACKET: 280-160-250 PACK at 09:11

## 2018-11-18 RX ADMIN — POTASSIUM & SODIUM PHOSPHATES POWDER PACK 280-160-250 MG 1 PACKET: 280-160-250 PACK at 05:11

## 2018-11-18 RX ADMIN — SODIUM BICARBONATE 650 MG TABLET 1300 MG: at 09:11

## 2018-11-18 RX ADMIN — ATORVASTATIN CALCIUM 80 MG: 40 TABLET, FILM COATED ORAL at 09:11

## 2018-11-18 RX ADMIN — FERROUS GLUCONATE 324 MG: 324 TABLET ORAL at 09:11

## 2018-11-18 RX ADMIN — SODIUM BICARBONATE 650 MG TABLET 1300 MG: at 08:11

## 2018-11-18 RX ADMIN — HYDROCODONE BITARTRATE AND ACETAMINOPHEN 1 TABLET: 10; 325 TABLET ORAL at 05:11

## 2018-11-18 RX ADMIN — METOPROLOL SUCCINATE 25 MG: 25 TABLET, EXTENDED RELEASE ORAL at 09:11

## 2018-11-18 RX ADMIN — HYDROCODONE BITARTRATE AND ACETAMINOPHEN 1 TABLET: 10; 325 TABLET ORAL at 03:11

## 2018-11-18 RX ADMIN — FAMOTIDINE 20 MG: 20 TABLET ORAL at 09:11

## 2018-11-18 RX ADMIN — ALBUTEROL SULFATE 5 MG: 2.5 SOLUTION RESPIRATORY (INHALATION) at 02:11

## 2018-11-18 RX ADMIN — LUBIPROSTONE 24 MCG: 24 CAPSULE, GELATIN COATED ORAL at 09:11

## 2018-11-18 RX ADMIN — ALBUTEROL SULFATE 5 MG: 2.5 SOLUTION RESPIRATORY (INHALATION) at 10:11

## 2018-11-18 NOTE — PROGRESS NOTES
Progress Note                     [facility]   Admit Date: 11/12/2018   LOS: 6 days     SUBJECTIVE:     Subjective: Patient feels well and is with daughter this am. She is tolerating diet well andNC for comfort. She has been working with PT/OT. Will ask nurse to help ambulate today with assistance.  Will get albuterol 5x1 for slight hyperK, and replete for phos. BP controlled. Cr improving.        Scheduled Meds:   albuterol sulfate  5 mg Nebulization Once    ascorbic acid (vitamin C)  500 mg Oral Daily    atorvastatin  80 mg Oral Daily    enoxaparin  30 mg Subcutaneous Daily    famotidine  20 mg Oral Daily    ferrous gluconate  324 mg Oral Daily with breakfast    furosemide  20 mg Intravenous Once    HYDROcodone-acetaminophen  1 tablet Oral Q6H    lubiprostone  24 mcg Oral BID WM    metoprolol succinate  25 mg Oral Daily    mirtazapine  15 mg Oral QHS    potassium, sodium phosphates  1 packet Oral Q4H While awake    senna-docusate 8.6-50 mg  1 tablet Oral Daily    sodium bicarbonate  1,300 mg Oral BID    vitamin D  2,000 Units Oral Daily     Continuous Infusions:  PRN Meds:albuterol, bisacodyl, dextrose 50%, dextrose 50%, glucagon (human recombinant), glucose, glucose, morphine, ondansetron, sodium chloride 0.9%    Review of patient's allergies indicates:   Allergen Reactions    Celebrex [celecoxib] Other (See Comments)     Pain down her spine       Review of Systems  See subjective    OBJECTIVE:     Vital Signs (Most Recent)  Temp: 98.5 °F (36.9 °C) (11/18/18 0721)  Pulse: 65 (11/18/18 0721)  Resp: 18 (11/18/18 0721)  BP: (!) 120/57 (11/18/18 0721)  SpO2: 97 % (11/18/18 0800)    Vital Signs Range (Last 24H):  Temp:  [98 °F (36.7 °C)-98.9 °F (37.2 °C)]   Pulse:  [65-80]   Resp:  [14-18]   BP: (112-135)/(57-81)   SpO2:  [95 %-97 %]     I & O (Last 24H):    Intake/Output Summary (Last 24 hours) at 11/18/2018 0921  Last data filed at 11/18/2018 0850  Gross per 24 hour   Intake 305 ml    Output 250 ml   Net 55 ml     Wt Readings from Last 3 Encounters:   11/18/18 67.4 kg (148 lb 9.4 oz)   11/12/18 76.7 kg (169 lb)   11/02/18 72.5 kg (159 lb 14.4 oz)     Physical Exam:  Physical Exam   Constitutional: She is oriented to person, place, and time. She appears well-developed and well-nourished.   HENT:   Head: Normocephalic and atraumatic.   Eyes: Conjunctivae and EOM are normal. Pupils are equal, round, and reactive to light.   Neck: Normal range of motion. Neck supple.   Cardiovascular: Normal rate. Exam reveals s4.  Pulmonary/Chest: Effort normal and breath sounds normal. Slight wheezing at the bases rosalinda.  Abdominal: Soft. Bowel sounds are normal.   Musculoskeletal: She exhibits tenderness and deformity. She exhibits +1 pitting edema rosalinda.   Right leg packed and no sign of acute bleeding, moving toes well.  R forearm slight bleeding from IV site.  Neurological: She is alert and oriented to person, place, and time.   Skin: Skin is warm and dry. Capillary refill takes less than 2 seconds.   Psychiatric: She has a normal mood and affect.         Laboratory:  LABS  CBC  Recent Labs   Lab 11/16/18  0522 11/17/18  0453 11/18/18  0512   WBC 6.89 7.46 6.54   RBC 3.02* 2.98* 2.84*   HGB 8.8* 8.8* 8.1*   HCT 29.0* 28.3* 27.0*   * 117* 178   MCV 96 95 95   MCH 29.1 29.5 28.5   MCHC 30.3* 31.1* 30.0*     BMP  Recent Labs   Lab 11/17/18  0454 11/17/18  1218 11/18/18  0512    138 139   K 5.9* 5.7* 5.3*   CO2 23 20* 25    109 108   BUN 35* 36* 36*   CREATININE 2.2* 2.3* 2.1*    151* 96       POCT-Glucose  POCT Glucose   Date Value Ref Range Status   11/18/2018 92 70 - 110 mg/dL Final   11/17/2018 111 (H) 70 - 110 mg/dL Final   11/17/2018 115 (H) 70 - 110 mg/dL Final   11/17/2018 140 (H) 70 - 110 mg/dL Final   11/17/2018 91 70 - 110 mg/dL Final   11/16/2018 116 (H) 70 - 110 mg/dL Final   11/16/2018 126 (H) 70 - 110 mg/dL Final   11/16/2018 122 (H) 70 - 110 mg/dL Final   11/16/2018 98 70 -  110 mg/dL Final   11/15/2018 108 70 - 110 mg/dL Final   11/15/2018 109 70 - 110 mg/dL Final   11/15/2018 109 70 - 110 mg/dL Final       Recent Labs   Lab 11/16/18  0522 11/17/18  0454 11/17/18  1218 11/18/18  0512   CALCIUM 8.7 9.4 9.8 10.1   MG 1.5* 2.0  --  2.0   PHOS 2.0* 1.6*  --  2.3*     LFT  Recent Labs   Lab 11/16/18  0522 11/17/18  0454 11/18/18  0512   PROT 5.5* 5.8* 5.7*   ALBUMIN 2.3* 2.4* 2.3*   BILITOT 0.3 0.4 0.5   AST 25 28 31   ALKPHOS 80 78 77   ALT <5* <5* 5*         COAGS  Recent Labs   Lab 11/12/18  1342 11/13/18  1018   INR 1.1 1.1   APTT 32.2* 29.9     CE  Recent Labs   Lab 11/13/18  1005 11/13/18  1018 11/13/18  1323   TROPONINI 0.023 0.014 0.006     ABGs  No results for input(s): PH, PCO2, PO2, HCO3, POCSATURATED, BE in the last 24 hours.  BNP  Recent Labs   Lab 11/13/18  1323 11/15/18  1026   * 189*     UA  No results for input(s): COLORU, CLARITYU, SPECGRAV, PHUR, PROTEINUA, GLUCOSEU, BLOODU, WBCU, RBCU, BACTERIA, MUCUS in the last 24 hours.    Invalid input(s):  BILIRUBINCON  LAST HbA1c  Lab Results   Component Value Date    HGBA1C 4.8 11/12/2018         Diagnostic Results:  Recent Labs     11/16/18  0522 11/17/18 0453 11/18/18  0512   WBC 6.89 7.46 6.54   HGB 8.8* 8.8* 8.1*   HCT 29.0* 28.3* 27.0*   * 117* 178   MCV 96 95 95   RDW 14.8* 14.8* 14.8*       Recent Labs     11/17/18  0454 11/17/18  1218 11/18/18  0512    138 139   K 5.9* 5.7* 5.3*    109 108   CO2 23 20* 25    151* 96   BUN 35* 36* 36*   CREATININE 2.2* 2.3* 2.1*   CALCIUM 9.4 9.8 10.1   PROT 5.8*  --  5.7*   ALBUMIN 2.4*  --  2.3*   BILITOT 0.4  --  0.5   ALKPHOS 78  --  77   AST 28  --  31   ALT <5*  --  5*   ANIONGAP 6* 9 6*   ESTGFRAFRICA 24* 22* 25*   EGFRNONAA 20* 19* 22*       Recent Labs     11/16/18  0522 11/17/18  0454 11/18/18  0512   MG 1.5* 2.0 2.0   PHOS 2.0* 1.6* 2.3*       Coags  Lab Results   Component Value Date    INR 1.1 11/13/2018    INR 1.1 11/12/2018    INR 1.1  01/06/2017    APTT 29.9 11/13/2018    APTT 32.2 (H) 11/12/2018    APTT 33.8 (H) 01/06/2017     Recent Labs   Lab 11/12/18  1342 11/13/18  1018   INR 1.1 1.1   APTT 32.2* 29.9       A1c:   Lab Results   Component Value Date    HGBA1C 4.8 11/12/2018   , Last Gluc:   Recent Labs   Lab 11/16/18  1949 11/17/18  0559 11/17/18  1214 11/17/18  1646 11/17/18  2048 11/18/18  0620   POCTGLUCOSE 116* 91 140* 115* 111* 92       TSH:   Lab Results   Component Value Date    TSH 0.983 11/12/2018         Cardiac Enzymes  No results for input(s): TROPONINI, CPKMB, CPK in the last 72 hours.      Urinalysis  Urinalysis  No results for input(s): COLORU, CLARITYU, SPECGRAV, PHUR, PROTEINUA, GLUCOSEU, BILIRUBINCON, BLOODU, WBCU, RBCU, BACTERIA, MUCUS, NITRITE, LEUKOCYTESUR, UROBILINOGEN, HYALINECASTS in the last 24 hours.  No results for input(s): COLORU, CLARITYU, SPECGRAV, PHUR, PROTEINUA, GLUCOSEU, BLOODU, WBCU, RBCU, BACTERIA, MUCUS in the last 24 hours.    Invalid input(s):  BILIRUBINCON    Micro  Microbiology Results (last 7 days)     ** No results found for the last 168 hours. **             ABG  No results for input(s): PH, PCO2, PO2, HCO3, POCSATURATED, BE in the last 168 hours.      Imaging   Imaging Results    None         IP CONSULT TO NEPHROLOGY-[facility]  IP CONSULT TO ORTHOPEDIC SURGERY  IP CONSULT TO CARDIOLOGY-[facility]  IP CONSULT TO ANESTHESIOLOGY  IP CONSULT TO NEPHROLOGY-[facility]    ASSESSMENT/PLAN:   Right femoral neck fracture   -Diagnosed by xray   -Right Leg externally rotated on exam   -No notable neurologic deficits   -Popliteal and dorsalis pedis pulses intact  -Orthopedics consulted, s/p Hemiarthroplasty of Right Hip  -Continue Pain control as tolerated   working with pt/ot and did well yesterday  PT OT today      Heart Failure with preserved EF   -Previous TTE shows diastolic dysfunction. EF 55-60%. (9/2016)   -Will repeat TTE today.   -EKG today sinus  - consulted cards, f/u recs, cleared by Dr. Curiel before the  surgery  - ischemic evaluation at later date after recovery     HTN  -Home regimen: Hydralizine 50mg PO BID, Isosorbide Mono-nitrate 60mg PO qHs, Lisinopril 5mg PO daily, Carvedilol 6.25mg PO BID, Amlodipine 10mg PO daily. Eliquis 10mg PO BID and Atorvastatin 80mg PO BID  -Will adjust accordingly following assement of vitals and fluid status  -BP WNL this am       CKD Grade 4  GFR 26   CR bump, will trend  Will continue to monitor  Will consult nephrology - Preparation for future dialysis?? not during this admission.  Cr improving     GERD  -dc'd PPI, and contineu famotidine      Arthritis   morphin 2mg prn q2hr, and norco 10 scheduled will give before pt/ot        Hyperkalemia  K 5.9  Albuterol, calcium, insulin and dextrose given this am   Holding ACE for how   Bmp at noon       PPx: SCDs /PENELOPE hose  Code: Full     Dc'd IVF per combined HF, pt tolerates po. Ekg sinus serjio,  Cxr, INR, PT/PTT, trops, bnp slightly higher s/p 1dose lasix, H/H stable, 2DE combined HF 35~40 with DD.      Dispo:   Working w/ PT/OT s/p Hemiarthroplasty of Right Hip, nephro on board, BP stable 120s  Just on BB. Placement.    Jose Mayes MD

## 2018-11-18 NOTE — PLAN OF CARE
Problem: Patient Care Overview  Goal: Plan of Care Review  Plan of care reviewed with Pt and family. Pt NSR on continuous telemetry. Pt up to bedside chair, frequent position changes encouraged. Bed locked and in lowest position, side rails up X2, bed alarm on when in bed. Call bell and possessions within reach. Slip resistant socks on. Instructed pt to notify staff for any assistance. Pt verbalized understanding.

## 2018-11-18 NOTE — PLAN OF CARE
Problem: Physical Therapy Goal  Goal: Physical Therapy Goal  Goals to be met by: 2018     Patient will increase functional independence with mobility by performin. Supine to sit with Modified [place]  2. Sit to stand transfer with Stand-by Assistance  3. Bed to chair transfer with Stand-by Assistance using Rolling Walker  4. Gait  x 50 feet with Stand-by Assistance using Rolling Walker.      Outcome: Ongoing (interventions implemented as appropriate)  Continue working toward goals.

## 2018-11-18 NOTE — PROGRESS NOTES
[facility]  Nephrology  Progress Note    Patient Name: Adilia Rosas  MRN: [id number]  Admission Date: 11/12/2018  Hospital Length of Stay: 6 days  Attending Provider: Oksana Sow MD   Primary Care Physician: Ely Dubois MD  Principal Problem:Fracture of femur    Subjective:     HPI: Pt. Stable, no c/o eager to go to Rehab this week.    Interval History:   Pt. Doing well, denies any pain.    Review of patient's allergies indicates:   Allergen Reactions    Celebrex [celecoxib] Other (See Comments)     Pain down her spine     Current Facility-Administered Medications   Medication Frequency    albuterol inhaler 2 puff Q4H PRN    ascorbic acid (vitamin C) tablet 500 mg Daily    atorvastatin tablet 80 mg Daily    bisacodyl suppository 10 mg Daily PRN    dextrose 50% injection 12.5 g PRN    dextrose 50% injection 25 g PRN    enoxaparin injection 30 mg Daily    famotidine tablet 20 mg Daily    ferrous gluconate Tab 324 mg Daily with breakfast    furosemide injection 20 mg Once    glucagon (human recombinant) injection 1 mg PRN    glucose chewable tablet 16 g PRN    glucose chewable tablet 24 g PRN    HYDROcodone-acetaminophen  mg per tablet 1 tablet Q6H    lubiprostone capsule 24 mcg BID WM    metoprolol succinate (TOPROL-XL) 24 hr tablet 25 mg Daily    mirtazapine tablet 15 mg QHS    morphine injection 2 mg Q4H PRN    ondansetron disintegrating tablet 8 mg Q8H PRN    potassium, sodium phosphates 280-160-250 mg packet 1 packet Q4H While awake    senna-docusate 8.6-50 mg per tablet 1 tablet Daily    sodium bicarbonate tablet 1,300 mg BID    sodium chloride 0.9% flush 5 mL PRN    vitamin D 1000 units tablet 2,000 Units Daily       Objective:     Vital Signs (Most Recent):  Temp: 97.5 °F (36.4 °C) (11/18/18 1129)  Pulse: 74 (11/18/18 1129)  Resp: 20 (11/18/18 1049)  BP: (!) 155/72 (11/18/18 1129)  SpO2: 97 % (11/18/18 1049)  O2 Device (Oxygen Therapy): nasal cannula  (11/18/18 1049) Vital Signs (24h Range):  Temp:  [97.5 °F (36.4 °C)-98.9 °F (37.2 °C)] 97.5 °F (36.4 °C)  Pulse:  [65-80] 74  Resp:  [14-20] 20  SpO2:  [96 %-97 %] 97 %  BP: (112-155)/(57-81) 155/72     Weight: 67.4 kg (148 lb 9.4 oz) (11/18/18 0528)  Body mass index is 25.51 kg/m².  Body surface area is 1.74 meters squared.    I/O last 3 completed shifts:  In: -   Out: 1304 [Urine:1304]    Physical Exam   Constitutional: She is oriented to person, place, and time. She appears well-developed and well-nourished.   HENT:   Head: Normocephalic and atraumatic.   Cardiovascular: Normal rate, regular rhythm and normal heart sounds.   Pulmonary/Chest: Effort normal and breath sounds normal.   Abdominal: Soft. Bowel sounds are normal.   Musculoskeletal: She exhibits no edema.   Neurological: She is alert and oriented to person, place, and time.   Skin: Skin is warm and dry.   Psychiatric: She has a normal mood and affect. Her behavior is normal.   Vitals reviewed.      Significant Labs:  Cardiac Markers: No results for input(s): CKMB, TROPONINT, MYOGLOBIN in the last 168 hours.  CBC:   Recent Labs   Lab 11/18/18  0512   WBC 6.54   RBC 2.84*   HGB 8.1*   HCT 27.0*      MCV 95   MCH 28.5   MCHC 30.0*     CMP:   Recent Labs   Lab 11/18/18  0512   GLU 96   CALCIUM 10.1   ALBUMIN 2.3*   PROT 5.7*      K 5.3*   CO2 25      BUN 36*   CREATININE 2.1*   ALKPHOS 77   ALT 5*   AST 31   BILITOT 0.5        Significant Imaging:  Labs: Reviewed  X-Ray: Reviewed    Assessment/Plan:     Chronic kidney disease (CKD) stage G3b/A1, moderately decreased glomerular filtration rate (GFR) between 30-44 mL/min/1.73 square meter and albuminuria creatinine ratio less than 30 mg/g    Pt. Stable,labs reviewed, and creat. Overall stable.    Pt. Stable from renal standpoint- will FU with me in [place] after d/c from Rehab.         Thank you for your consult. I will follow-up with patient. Please contact us if you have any additional  questions.    Yudi Dorado MD  Nephrology  [facility]

## 2018-11-18 NOTE — PLAN OF CARE
Problem: Physical Therapy Goal  Goal: Physical Therapy Goal  Goals to be met by: 2018     Patient will increase functional independence with mobility by performin. Supine to sit with Modified [place]  2. Sit to stand transfer with Stand-by Assistance  3. Bed to chair transfer with Stand-by Assistance using Rolling Walker  4. Gait  x 50 feet with Stand-by Assistance using Rolling Walker.      Outcome: Ongoing (interventions implemented as appropriate)  Continue working toward goals.

## 2018-11-18 NOTE — PT/OT/SLP PROGRESS
Physical Therapy Treatment    Patient Name:  Adilia Rosas   MRN:  [id number]    Recommendations:     Discharge Recommendations:  nursing facility, skilled   Discharge Equipment Recommendations: bath bench   Barriers to discharge: Decreased endurance and strength    Assessment:     Adilia Rosas is a 83 y.o. female admitted with a medical diagnosis of Fracture of femur.  She presents with the following impairments/functional limitations:  weakness, impaired endurance, impaired self care skills, impaired functional mobilty, gait instability, decreased lower extremity function, decreased upper extremity function, pain, impaired skin, decreased ROM, orthopedic precautions.      Rehab Prognosis:  Fair+; patient would benefit from acute skilled PT services to address these deficits and reach maximum level of function.      Recent Surgery: Procedure(s) (LRB):  HEMIARTHROPLASTY, HIP (Right) 5 Days Post-Op    Plan:     During this hospitalization, patient to be seen BID to address the above listed problems via gait training, therapeutic activities, therapeutic exercises  · Plan of Care Expires:  12/14/18   Plan of Care Reviewed with: patient    Subjective     Communicated with RN (Cyndi) prior to session.  Patient found sitting up in b/s recliner upon PT entry to room, agreeable to treatment.        Patient comments/goals: Pt agreed to tx.  Pain/Comfort:  · Pain Rating 1: 6/10  · Location - Side 1: Right  · Location - Orientation 1: generalized  · Location 1: hip  · Pain Addressed 1: Reposition, Nurse notified  · Pain Rating Post-Intervention 1: 4/10    Patients cultural, spiritual, Sikh conflicts given the current situation:      Objective:     Patient found with:       General Precautions: Standard, fall   Orthopedic Precautions:RLE weight bearing as tolerated   Braces:       Functional Mobility:  · Bed Mobility:     · Scooting: stand by assistance and to edge of chair and back into center of bed  · Sit to Supine:  minimum assistance and RLE  · Transfers:     · Sit to Stand:  moderate assistance with rolling walker  · Gait: 30ft with RW and Julisa/CGA with vc's advance RW farther prior to taking a step.  Julisa for RW management on turns.  VC's for upright posture and sequencing.  · Balance: sitting good, standing fair RW, gait fair-fair rw      AM-PAC 6 CLICK MOBILITY  Turning over in bed (including adjusting bedclothes, sheets and blankets)?: 3  Sitting down on and standing up from a chair with arms (e.g., wheelchair, bedside commode, etc.): 2  Moving from lying on back to sitting on the side of the bed?: 3  Moving to and from a bed to a chair (including a wheelchair)?: 3  Need to walk in hospital room?: 3  Climbing 3-5 steps with a railing?: 1  Basic Mobility Total Score: 15       Therapeutic Activities and Exercises:   Supine BLE therex: AP, heelslides, hip ABd/ADD and QS.  B heels floated off pillow after tx for pressure relief.  Sit > STand requires ModA and RW and pt with increased knee extended secondary to arthritis and painful when knees are flexed.  Pt requires ModA to maintain partial standing while pt brings feet under hips then grabs Rw.    Patient left supine with all lines intact, call button in reach, bed alarm on and RN notified..    GOALS:   Multidisciplinary Problems     Physical Therapy Goals        Problem: Physical Therapy Goal    Goal Priority Disciplines Outcome Goal Variances Interventions   Physical Therapy Goal     PT, PT/OT Ongoing (interventions implemented as appropriate)     Description:  Goals to be met by: 2018     Patient will increase functional independence with mobility by performin. Supine to sit with Modified [place]  2. Sit to stand transfer with Stand-by Assistance  3. Bed to chair transfer with Stand-by Assistance using Rolling Walker  4. Gait  x 50 feet with Stand-by Assistance using Rolling Walker.                       Time Tracking:     PT Received On: 18  PT  Start Time: 1335     PT Stop Time: 1405  PT Total Time (min): 30 min     Billable Minutes: Gait Training 10 and Therapeutic Activity 20    Treatment Type: Treatment  PT/PTA: PTA     PTA Visit Number: 4     Roro Fam PTA  11/18/2018

## 2018-11-18 NOTE — PROGRESS NOTES
Progress Note                     [facility]   Admit Date: 11/12/2018   LOS: 4 days     SUBJECTIVE:     Subjective: The patient said she is feeling really good, eating and drinking, pain is tolerable. Working with PT/OT. Passed some gas, had bm. Good UOP. Feel well, lower leg edema got better. Talked with nephrology about fluid status.      HPI:  Patient is a 83 y.o. female who presents to to the ED as a transfer from [facility] 2/2 to lack of orthopedic coverage.. Patient stood up from her chair and fell on to her right side when attempting to take a step. Patient denies any dizziness or LOC prior to fall or any head trauma, Patient was found to have a right femoral neck fracture. At presentation patient denied any HA, fever, chills, chest pain, SOB. Patient now continues to endorses right hip pain, rated 7/10, does not radiate, localized to site of injury with no loss of sensation.         Scheduled Meds:   ascorbic acid (vitamin C)  500 mg Oral Daily    atorvastatin  80 mg Oral Daily    enoxaparin  30 mg Subcutaneous Daily    famotidine  20 mg Oral Daily    ferrous gluconate  324 mg Oral Daily with breakfast    furosemide  20 mg Intravenous Once    HYDROcodone-acetaminophen  1 tablet Oral Q6H    lubiprostone  24 mcg Oral BID WM    metoprolol succinate  25 mg Oral Daily    mirtazapine  15 mg Oral QHS    potassium, sodium phosphates  1 packet Oral Q4H While awake    senna-docusate 8.6-50 mg  1 tablet Oral Daily    sodium bicarbonate  1,300 mg Oral BID    vitamin D  2,000 Units Oral Daily     Continuous Infusions:  PRN Meds:albuterol, bisacodyl, dextrose 50%, dextrose 50%, glucagon (human recombinant), glucose, glucose, morphine, ondansetron, sodium chloride 0.9%    Review of patient's allergies indicates:   Allergen Reactions    Celebrex [celecoxib] Other (See Comments)     Pain down her spine       Review of Systems  See subjective    OBJECTIVE:     Vital Signs (Most  Recent)  Temp: 97.5 °F (36.4 °C) (11/18/18 1129)  Pulse: 74 (11/18/18 1129)  Resp: 20 (11/18/18 1049)  BP: (!) 155/72 (11/18/18 1129)  SpO2: 97 % (11/18/18 1049)    Vital Signs Range (Last 24H):  Temp:  [97.5 °F (36.4 °C)-98.5 °F (36.9 °C)]   Pulse:  [65-80]   Resp:  [14-20]   BP: (112-155)/(57-72)   SpO2:  [96 %-97 %]     I & O (Last 24H):    Intake/Output Summary (Last 24 hours) at 11/18/2018 1228  Last data filed at 11/18/2018 0850  Gross per 24 hour   Intake 305 ml   Output 250 ml   Net 55 ml     Wt Readings from Last 3 Encounters:   11/18/18 67.4 kg (148 lb 9.4 oz)   11/12/18 76.7 kg (169 lb)   11/02/18 72.5 kg (159 lb 14.4 oz)     Physical Exam:  Physical Exam   Constitutional: She is oriented to person, place, and time. She appears well-developed and well-nourished.   HENT:   Head: Normocephalic and atraumatic.   Eyes: Conjunctivae and EOM are normal. Pupils are equal, round, and reactive to light.   Neck: Normal range of motion. Neck supple.   Cardiovascular: Normal rate. Exam reveals s4.  Pulmonary/Chest: Effort normal and breath sounds normal. Slight wheezing at the bases rosalinda.  Abdominal: Soft. Bowel sounds are normal.   Musculoskeletal: She exhibits tenderness and deformity. She exhibits +1 pitting edema rosalinda.   Right leg packed and no sign of acute bleeding, moving toes well.  R forearm slight bleeding from IV site.  Neurological: She is alert and oriented to person, place, and time.   Skin: Skin is warm and dry. Capillary refill takes less than 2 seconds.   Psychiatric: She has a normal mood and affect.         Laboratory:  LABS  CBC  Recent Labs   Lab 11/16/18  0522     WBC 6.89     RBC 3.02*     HGB 8.8*     HCT 29.0*     *     MCV 96     MCH 29.1     MCHC 30.3*       BMP     Ref Range & Units 11/16/18   Sodium 136 - 145 mmol/L 138    Potassium 3.5 - 5.1 mmol/L 5.3 Abnormally high     Chloride 95 - 110 mmol/L 110    CO2 23 - 29 mmol/L 22 Abnormally low     Glucose 70 - 110 mg/dL 92    BUN, Bld 8  - 23 mg/dL 36 Abnormally high     Creatinine 0.5 - 1.4 mg/dL 2.5 Abnormally high     Calcium 8.7 - 10.5 mg/dL 8.7    Total Protein 6.0 - 8.4 g/dL 5.5 Abnormally low     Albumin 3.5 - 5.2 g/dL 2.3 Abnormally low     Total Bilirubin 0.1 - 1.0 mg/dL 0.3    Comment: For infants and newborns, interpretation of results should be based   on gestational age, weight and in agreement with clinical   observations.   Premature Infant recommended reference ranges:   Up to 24 hours.............<8.0 mg/dL   Up to 48 hours............<12.0 mg/dL   3-5 days..................<15.0 mg/dL   6-29 days.................<15.0 mg/dL    Alkaline Phosphatase 55 - 135 U/L 80    AST 10 - 40 U/L 25    ALT 10 - 44 U/L <5 Abnormally low     Anion Gap 8 - 16 mmol/L 6 Abnormally low     eGFR if African American >60 mL/min/1.73 m^2 20 Abnormal     eGFR if non African American >60 mL/min/1.73 m^2 17 Abnormal         POCT-Glucose  POCT Glucose   Date Value Ref Range Status                                             11/16/2018 126 (H) 70 - 110 mg/dL Final   11/16/2018 122 (H) 70 - 110 mg/dL Final   11/16/2018 98 70 - 110 mg/dL Final   11/15/2018 108 70 - 110 mg/dL Final   11/15/2018 109 70 - 110 mg/dL Final       Recent Labs   Lab 11/16/18  0522      CALCIUM 8.7      MG 1.5*      PHOS 2.0*        LFT  Recent Labs   Lab 11/16/18  0522     PROT 5.5*     ALBUMIN 2.3*     BILITOT 0.3     AST 25     ALKPHOS 80     ALT <5*           COAGS  Recent Labs   Lab 11/12/18  1342 11/13/18  1018   INR 1.1 1.1   APTT 32.2* 29.9     CE  Recent Labs   Lab 11/13/18  1005 11/13/18  1018 11/13/18  1323   TROPONINI 0.023 0.014 0.006     ABGs  No results for input(s): PH, PCO2, PO2, HCO3, POCSATURATED, BE in the last 24 hours.  BNP  Recent Labs   Lab 11/13/18  1323 11/15/18  1026   * 189*     UA  No results for input(s): COLORU, CLARITYU, SPECGRAV, PHUR, PROTEINUA, GLUCOSEU, BLOODU, WBCU, RBCU, BACTERIA, MUCUS in the last 24 hours.    Invalid input(s):   BILIRUBINCON  LAST HbA1c  Lab Results   Component Value Date    HGBA1C 4.8 11/12/2018         Diagnostic Results:  Recent Labs     11/16/18  0522     WBC 6.89     HGB 8.8*     HCT 29.0*     *     MCV 96     RDW 14.8*             Recent Labs     11/16/18  0522     MG 1.5*     PHOS 2.0*         Coags  Lab Results   Component Value Date    INR 1.1 11/13/2018    INR 1.1 11/12/2018    INR 1.1 01/06/2017    APTT 29.9 11/13/2018    APTT 32.2 (H) 11/12/2018    APTT 33.8 (H) 01/06/2017     Recent Labs   Lab 11/12/18  1342 11/13/18  1018   INR 1.1 1.1   APTT 32.2* 29.9       A1c:   Lab Results   Component Value Date    HGBA1C 4.8 11/12/2018   , Last Gluc:     Lab Results   Component Value Date    TSH 0.983 11/12/2018         Cardiac Enzymes  No results for input(s): TROPONINI, CPKMB, CPK in the last 72 hours.      Urinalysis  Urinalysis  No results for input(s): COLORU, CLARITYU, SPECGRAV, PHUR, PROTEINUA, GLUCOSEU, BILIRUBINCON, BLOODU, WBCU, RBCU, BACTERIA, MUCUS, NITRITE, LEUKOCYTESUR, UROBILINOGEN, HYALINECASTS in the last 24 hours.  No results for input(s): COLORU, CLARITYU, SPECGRAV, PHUR, PROTEINUA, GLUCOSEU, BLOODU, WBCU, RBCU, BACTERIA, MUCUS in the last 24 hours.    Invalid input(s):  BILIRUBINCON    Micro  Microbiology Results (last 7 days)     ** No results found for the last 168 hours. **             ABG  No results for input(s): PH, PCO2, PO2, HCO3, POCSATURATED, BE in the last 168 hours.      Imaging   Imaging Results    None             IP CONSULT TO NEPHROLOGY-[facility]  IP CONSULT TO ORTHOPEDIC SURGERY  IP CONSULT TO CARDIOLOGY-[facility]  IP CONSULT TO ANESTHESIOLOGY  IP CONSULT TO NEPHROLOGY-[facility]    ASSESSMENT/PLAN:   Right femoral neck fracture   -Diagnosed by xray   -Right Leg externally rotated on exam   -No notable neurologic deficits   -Popliteal and dorsalis pedis pulses intact  -Orthopedics consulted, s/p Hemiarthroplasty of Right Hip  -Continue Pain control as tolerated   working with pt/ot     Heart  Failure with preserved EF   -Previous TTE shows diastolic dysfunction. EF 55-60%. (9/2016)   -Will repeat TTE today.   -EKG today sinus  - consulted cards, f/u recs, cleared by Dr. Curiel before the surgery  - Fluid balance. Consulted L-Nephro, follow recs       HTN  -Home regimen: Hydralizine 50mg PO BID, Isosorbide Mono-nitrate 60mg PO qHs, Lisinopril 5mg PO daily, Carvedilol 6.25mg PO BID, Amlodipine 10mg PO daily. Eliquis 10mg PO BID and Atorvastatin 80mg PO BID  -Will adjust accordingly following assement of vitals and fluid status       CKD Grade 4  GFR 26   CR bump, will trend  Will continue to monitor  Will consult nephrology - Preparation for future dialysis?? not during this admission.     GERD  -Continue PPI      Arthritis   morphin 2mg prn q2hr, and norco 10 scheduled will give before pt/ot      Hypomagnesium   repleted    Hyperkalemia  Stable, continue to watch      PPx: SCDs /PENELOPE hose  Code: Full     Dc'd IVF per combined HF, pt tolerates po. Ekg sinus serjio,  Cxr, INR, PT/PTT, trops, bnp slightly higher s/p 1dose lasix, H/H stable, 2DE combined HF 35~40 with DD.      Dispo:   Working w/ PT/OT s/p Hemiarthroplasty of Right Hip, F/U with cxr, still on O2, will CXR and BNP, and + for Fluid, will gentle diuresing, and wean off O2.    Jose Mayes MD

## 2018-11-18 NOTE — ASSESSMENT & PLAN NOTE
Pt. Stable,labs reviewed, and creat. Overall stable.    Pt. Stable from renal standpoint- will FU with me in [place] after d/c from Rehab.

## 2018-11-18 NOTE — SUBJECTIVE & OBJECTIVE
Interval History:   Pt. Doing well, denies any pain.    Review of patient's allergies indicates:   Allergen Reactions    Celebrex [celecoxib] Other (See Comments)     Pain down her spine     Current Facility-Administered Medications   Medication Frequency    albuterol inhaler 2 puff Q4H PRN    ascorbic acid (vitamin C) tablet 500 mg Daily    atorvastatin tablet 80 mg Daily    bisacodyl suppository 10 mg Daily PRN    dextrose 50% injection 12.5 g PRN    dextrose 50% injection 25 g PRN    enoxaparin injection 30 mg Daily    famotidine tablet 20 mg Daily    ferrous gluconate Tab 324 mg Daily with breakfast    furosemide injection 20 mg Once    glucagon (human recombinant) injection 1 mg PRN    glucose chewable tablet 16 g PRN    glucose chewable tablet 24 g PRN    HYDROcodone-acetaminophen  mg per tablet 1 tablet Q6H    lubiprostone capsule 24 mcg BID WM    metoprolol succinate (TOPROL-XL) 24 hr tablet 25 mg Daily    mirtazapine tablet 15 mg QHS    morphine injection 2 mg Q4H PRN    ondansetron disintegrating tablet 8 mg Q8H PRN    potassium, sodium phosphates 280-160-250 mg packet 1 packet Q4H While awake    senna-docusate 8.6-50 mg per tablet 1 tablet Daily    sodium bicarbonate tablet 1,300 mg BID    sodium chloride 0.9% flush 5 mL PRN    vitamin D 1000 units tablet 2,000 Units Daily       Objective:     Vital Signs (Most Recent):  Temp: 97.5 °F (36.4 °C) (11/18/18 1129)  Pulse: 74 (11/18/18 1129)  Resp: 20 (11/18/18 1049)  BP: (!) 155/72 (11/18/18 1129)  SpO2: 97 % (11/18/18 1049)  O2 Device (Oxygen Therapy): nasal cannula (11/18/18 1049) Vital Signs (24h Range):  Temp:  [97.5 °F (36.4 °C)-98.9 °F (37.2 °C)] 97.5 °F (36.4 °C)  Pulse:  [65-80] 74  Resp:  [14-20] 20  SpO2:  [96 %-97 %] 97 %  BP: (112-155)/(57-81) 155/72     Weight: 67.4 kg (148 lb 9.4 oz) (11/18/18 0528)  Body mass index is 25.51 kg/m².  Body surface area is 1.74 meters squared.    I/O last 3 completed shifts:  In: -    Out: 1304 [Urine:1304]    Physical Exam   Constitutional: She is oriented to person, place, and time. She appears well-developed and well-nourished.   HENT:   Head: Normocephalic and atraumatic.   Cardiovascular: Normal rate, regular rhythm and normal heart sounds.   Pulmonary/Chest: Effort normal and breath sounds normal.   Abdominal: Soft. Bowel sounds are normal.   Musculoskeletal: She exhibits no edema.   Neurological: She is alert and oriented to person, place, and time.   Skin: Skin is warm and dry.   Psychiatric: She has a normal mood and affect. Her behavior is normal.   Vitals reviewed.      Significant Labs:  Cardiac Markers: No results for input(s): CKMB, TROPONINT, MYOGLOBIN in the last 168 hours.  CBC:   Recent Labs   Lab 11/18/18  0512   WBC 6.54   RBC 2.84*   HGB 8.1*   HCT 27.0*      MCV 95   MCH 28.5   MCHC 30.0*     CMP:   Recent Labs   Lab 11/18/18  0512   GLU 96   CALCIUM 10.1   ALBUMIN 2.3*   PROT 5.7*      K 5.3*   CO2 25      BUN 36*   CREATININE 2.1*   ALKPHOS 77   ALT 5*   AST 31   BILITOT 0.5        Significant Imaging:  Labs: Reviewed  X-Ray: Reviewed

## 2018-11-19 VITALS
BODY MASS INDEX: 25.41 KG/M2 | HEART RATE: 72 BPM | SYSTOLIC BLOOD PRESSURE: 114 MMHG | HEIGHT: 64 IN | RESPIRATION RATE: 18 BRPM | TEMPERATURE: 99 F | OXYGEN SATURATION: 96 % | DIASTOLIC BLOOD PRESSURE: 72 MMHG | WEIGHT: 148.81 LBS

## 2018-11-19 LAB
ALBUMIN SERPL BCP-MCNC: 2.3 G/DL
ALP SERPL-CCNC: 75 U/L
ALT SERPL W/O P-5'-P-CCNC: 7 U/L
ANION GAP SERPL CALC-SCNC: 7 MMOL/L
AST SERPL-CCNC: 30 U/L
BASOPHILS # BLD AUTO: 0.01 K/UL
BASOPHILS NFR BLD: 0.2 %
BILIRUB SERPL-MCNC: 0.5 MG/DL
BUN SERPL-MCNC: 34 MG/DL
CALCIUM SERPL-MCNC: 10.2 MG/DL
CHLORIDE SERPL-SCNC: 106 MMOL/L
CO2 SERPL-SCNC: 26 MMOL/L
CREAT SERPL-MCNC: 2.1 MG/DL
DIFFERENTIAL METHOD: ABNORMAL
EOSINOPHIL # BLD AUTO: 0.3 K/UL
EOSINOPHIL NFR BLD: 5.5 %
ERYTHROCYTE [DISTWIDTH] IN BLOOD BY AUTOMATED COUNT: 14.6 %
EST. GFR  (AFRICAN AMERICAN): 25 ML/MIN/1.73 M^2
EST. GFR  (NON AFRICAN AMERICAN): 22 ML/MIN/1.73 M^2
GLUCOSE SERPL-MCNC: 95 MG/DL
HCT VFR BLD AUTO: 28.2 %
HGB BLD-MCNC: 8.6 G/DL
LYMPHOCYTES # BLD AUTO: 1.3 K/UL
LYMPHOCYTES NFR BLD: 21.9 %
MAGNESIUM SERPL-MCNC: 1.7 MG/DL
MCH RBC QN AUTO: 29.2 PG
MCHC RBC AUTO-ENTMCNC: 30.5 G/DL
MCV RBC AUTO: 96 FL
MONOCYTES # BLD AUTO: 0.9 K/UL
MONOCYTES NFR BLD: 14.7 %
NEUTROPHILS # BLD AUTO: 3.3 K/UL
NEUTROPHILS NFR BLD: 57.5 %
PHOSPHATE SERPL-MCNC: 2.8 MG/DL
PLATELET # BLD AUTO: 207 K/UL
PMV BLD AUTO: 11.3 FL
POCT GLUCOSE: 106 MG/DL (ref 70–110)
POCT GLUCOSE: 106 MG/DL (ref 70–110)
POCT GLUCOSE: 107 MG/DL (ref 70–110)
POTASSIUM SERPL-SCNC: 5.3 MMOL/L
PROT SERPL-MCNC: 5.6 G/DL
RBC # BLD AUTO: 2.95 M/UL
SODIUM SERPL-SCNC: 139 MMOL/L
WBC # BLD AUTO: 5.79 K/UL

## 2018-11-19 PROCEDURE — 85025 COMPLETE CBC W/AUTO DIFF WBC: CPT

## 2018-11-19 PROCEDURE — 36415 COLL VENOUS BLD VENIPUNCTURE: CPT

## 2018-11-19 PROCEDURE — 25000003 PHARM REV CODE 250: Performed by: STUDENT IN AN ORGANIZED HEALTH CARE EDUCATION/TRAINING PROGRAM

## 2018-11-19 PROCEDURE — 94761 N-INVAS EAR/PLS OXIMETRY MLT: CPT

## 2018-11-19 PROCEDURE — 83735 ASSAY OF MAGNESIUM: CPT

## 2018-11-19 PROCEDURE — 25000003 PHARM REV CODE 250: Performed by: INTERNAL MEDICINE

## 2018-11-19 PROCEDURE — 80053 COMPREHEN METABOLIC PANEL: CPT

## 2018-11-19 PROCEDURE — 25000003 PHARM REV CODE 250: Performed by: FAMILY MEDICINE

## 2018-11-19 PROCEDURE — 84100 ASSAY OF PHOSPHORUS: CPT

## 2018-11-19 PROCEDURE — 99900035 HC TECH TIME PER 15 MIN (STAT)

## 2018-11-19 RX ORDER — ALBUTEROL SULFATE 2.5 MG/.5ML
5 SOLUTION RESPIRATORY (INHALATION)
Qty: 5 MG | Refills: 0 | Status: SHIPPED | OUTPATIENT
Start: 2018-11-19

## 2018-11-19 RX ORDER — FERROUS GLUCONATE 324(37.5)
325 TABLET ORAL
Qty: 30 TABLET | Refills: 11 | COMMUNITY
Start: 2018-11-20 | End: 2021-05-10

## 2018-11-19 RX ORDER — ALBUTEROL SULFATE 90 UG/1
2 AEROSOL, METERED RESPIRATORY (INHALATION) EVERY 6 HOURS PRN
Qty: 18 G | Refills: 6 | Status: SHIPPED | OUTPATIENT
Start: 2018-11-19 | End: 2019-11-14

## 2018-11-19 RX ORDER — ALBUTEROL SULFATE 2.5 MG/.5ML
5 SOLUTION RESPIRATORY (INHALATION) ONCE
Status: DISCONTINUED | OUTPATIENT
Start: 2018-11-19 | End: 2018-11-19 | Stop reason: HOSPADM

## 2018-11-19 RX ADMIN — SODIUM BICARBONATE 650 MG TABLET 1300 MG: at 08:11

## 2018-11-19 RX ADMIN — OXYCODONE HYDROCHLORIDE AND ACETAMINOPHEN 500 MG: 500 TABLET ORAL at 08:11

## 2018-11-19 RX ADMIN — VITAMIN D, TAB 1000IU (100/BT) 2000 UNITS: 25 TAB at 08:11

## 2018-11-19 RX ADMIN — STANDARDIZED SENNA CONCENTRATE AND DOCUSATE SODIUM 1 TABLET: 8.6; 5 TABLET, FILM COATED ORAL at 08:11

## 2018-11-19 RX ADMIN — LUBIPROSTONE 24 MCG: 24 CAPSULE, GELATIN COATED ORAL at 08:11

## 2018-11-19 RX ADMIN — HYDROCODONE BITARTRATE AND ACETAMINOPHEN 1 TABLET: 10; 325 TABLET ORAL at 07:11

## 2018-11-19 RX ADMIN — FAMOTIDINE 20 MG: 20 TABLET ORAL at 08:11

## 2018-11-19 RX ADMIN — METOPROLOL SUCCINATE 25 MG: 25 TABLET, EXTENDED RELEASE ORAL at 08:11

## 2018-11-19 RX ADMIN — HYDROCODONE BITARTRATE AND ACETAMINOPHEN 1 TABLET: 10; 325 TABLET ORAL at 12:11

## 2018-11-19 RX ADMIN — FERROUS GLUCONATE 324 MG: 324 TABLET ORAL at 08:11

## 2018-11-19 RX ADMIN — ATORVASTATIN CALCIUM 80 MG: 40 TABLET, FILM COATED ORAL at 08:11

## 2018-11-19 NOTE — PROGRESS NOTES
Progress Note                     [facility]   Admit Date: 11/12/2018   LOS: 7 days     SUBJECTIVE:     Subjective: Patient feels well and is with daughter this am. She is tolerating diet well, not on any oxygen. She has not been eating any banana. BP yesterday was 150s, K still high. I advised the patietn and family, I will resume lisinopril which will help with bp and hyperkalemia, they agreed.. She has been working with PT/OT. Will ask nurse to help ambulate today with assistance.  Will get albuterol 5x1 for slight hyperK, BP controlled. Cr improving.        Scheduled Meds:   albuterol sulfate  5 mg Nebulization Once    ascorbic acid (vitamin C)  500 mg Oral Daily    atorvastatin  80 mg Oral Daily    enoxaparin  30 mg Subcutaneous Daily    famotidine  20 mg Oral Daily    ferrous gluconate  324 mg Oral Daily with breakfast    furosemide  20 mg Intravenous Once    HYDROcodone-acetaminophen  1 tablet Oral Q6H    lisinopril  5 mg Oral Daily    lubiprostone  24 mcg Oral BID WM    metoprolol succinate  25 mg Oral Daily    mirtazapine  15 mg Oral QHS    senna-docusate 8.6-50 mg  1 tablet Oral Daily    sodium bicarbonate  1,300 mg Oral BID    vitamin D  2,000 Units Oral Daily     Continuous Infusions:  PRN Meds:albuterol, bisacodyl, dextrose 50%, dextrose 50%, glucagon (human recombinant), glucose, glucose, morphine, ondansetron, sodium chloride 0.9%    Review of patient's allergies indicates:   Allergen Reactions    Celebrex [celecoxib] Other (See Comments)     Pain down her spine       Review of Systems  See subjective    OBJECTIVE:     Vital Signs (Most Recent)  Temp: 98.3 °F (36.8 °C) (11/19/18 0817)  Pulse: 70 (11/19/18 0817)  Resp: 15 (11/19/18 0817)  BP: 124/74 (11/19/18 0817)  SpO2: 96 % (11/19/18 0755)    Vital Signs Range (Last 24H):  Temp:  [97.5 °F (36.4 °C)-98.7 °F (37.1 °C)]   Pulse:  [69-80]   Resp:  [15-21]   BP: (123-155)/(62-74)   SpO2:  [96 %-98 %]     I & O (Last  24H):    Intake/Output Summary (Last 24 hours) at 11/19/2018 0948  Last data filed at 11/19/2018 0416  Gross per 24 hour   Intake 818 ml   Output 634 ml   Net 184 ml     Wt Readings from Last 3 Encounters:   11/19/18 67.5 kg (148 lb 13 oz)   11/12/18 76.7 kg (169 lb)   11/02/18 72.5 kg (159 lb 14.4 oz)     Physical Exam:  Physical Exam   Constitutional: She is oriented to person, place, and time. She appears well-developed and well-nourished.   HENT:   Head: Normocephalic and atraumatic.   Eyes: Conjunctivae and EOM are normal. Pupils are equal, round, and reactive to light.   Neck: Normal range of motion. Neck supple.   Cardiovascular: Normal rate. Exam reveals s4.  Pulmonary/Chest: Effort normal and breath sounds normal. Slight wheezing at the bases rosalinda.  Abdominal: Soft. Bowel sounds are normal.   Musculoskeletal: She exhibits tenderness and deformity. She exhibits +1 pitting edema rosalinda.   Right leg packed and no sign of acute bleeding, moving toes well.  R forearm slight bleeding from IV site.  Neurological: She is alert and oriented to person, place, and time.   Skin: Skin is warm and dry. Capillary refill takes less than 2 seconds.   Psychiatric: She has a normal mood and affect.         Laboratory:  LABS  CBC  Recent Labs   Lab 11/17/18  0453 11/18/18  0512 11/19/18  0514   WBC 7.46 6.54 5.79   RBC 2.98* 2.84* 2.95*   HGB 8.8* 8.1* 8.6*   HCT 28.3* 27.0* 28.2*   * 178 207   MCV 95 95 96   MCH 29.5 28.5 29.2   MCHC 31.1* 30.0* 30.5*     BMP  Recent Labs   Lab 11/18/18  0512 11/18/18  1349 11/19/18  0514    138 139   K 5.3* 5.8* 5.3*   CO2 25 26 26    106 106   BUN 36* 35* 34*   CREATININE 2.1* 2.1* 2.1*   GLU 96 120* 95       POCT-Glucose  POCT Glucose   Date Value Ref Range Status   11/19/2018 107 70 - 110 mg/dL Final   11/18/2018 108 70 - 110 mg/dL Final   11/18/2018 105 70 - 110 mg/dL Final   11/18/2018 107 70 - 110 mg/dL Final   11/18/2018 92 70 - 110 mg/dL Final   11/17/2018 111 (H) 70 -  110 mg/dL Final   11/17/2018 115 (H) 70 - 110 mg/dL Final   11/17/2018 140 (H) 70 - 110 mg/dL Final   11/17/2018 91 70 - 110 mg/dL Final   11/16/2018 116 (H) 70 - 110 mg/dL Final   11/16/2018 126 (H) 70 - 110 mg/dL Final   11/16/2018 122 (H) 70 - 110 mg/dL Final       Recent Labs   Lab 11/17/18  0454  11/18/18  0512 11/18/18  1349 11/19/18  0514   CALCIUM 9.4   < > 10.1 10.7* 10.2   MG 2.0  --  2.0  --  1.7   PHOS 1.6*  --  2.3*  --  2.8    < > = values in this interval not displayed.     LFT  Recent Labs   Lab 11/17/18  0454 11/18/18  0512 11/19/18  0514   PROT 5.8* 5.7* 5.6*   ALBUMIN 2.4* 2.3* 2.3*   BILITOT 0.4 0.5 0.5   AST 28 31 30   ALKPHOS 78 77 75   ALT <5* 5* 7*         COAGS  Recent Labs   Lab 11/12/18  1342 11/13/18  1018   INR 1.1 1.1   APTT 32.2* 29.9     CE  Recent Labs   Lab 11/13/18  1005 11/13/18  1018 11/13/18  1323   TROPONINI 0.023 0.014 0.006     ABGs  No results for input(s): PH, PCO2, PO2, HCO3, POCSATURATED, BE in the last 24 hours.  BNP  Recent Labs   Lab 11/13/18  1323 11/15/18  1026   * 189*     UA  No results for input(s): COLORU, CLARITYU, SPECGRAV, PHUR, PROTEINUA, GLUCOSEU, BLOODU, WBCU, RBCU, BACTERIA, MUCUS in the last 24 hours.    Invalid input(s):  BILIRUBINCON  LAST HbA1c  Lab Results   Component Value Date    HGBA1C 4.8 11/12/2018         Diagnostic Results:  Recent Labs     11/17/18  0453 11/18/18  0512 11/19/18  0514   WBC 7.46 6.54 5.79   HGB 8.8* 8.1* 8.6*   HCT 28.3* 27.0* 28.2*   * 178 207   MCV 95 95 96   RDW 14.8* 14.8* 14.6*       Recent Labs     11/18/18  0512 11/18/18  1349 11/19/18  0514    138 139   K 5.3* 5.8* 5.3*    106 106   CO2 25 26 26   GLU 96 120* 95   BUN 36* 35* 34*   CREATININE 2.1* 2.1* 2.1*   CALCIUM 10.1 10.7* 10.2   PROT 5.7*  --  5.6*   ALBUMIN 2.3*  --  2.3*   BILITOT 0.5  --  0.5   ALKPHOS 77  --  75   AST 31  --  30   ALT 5*  --  7*   ANIONGAP 6* 6* 7*   ESTGFRAFRICA 25* 25* 25*   EGFRNONAA 22* 22* 22*       Recent Labs      11/17/18  0454 11/18/18  0512 11/19/18  0514   MG 2.0 2.0 1.7   PHOS 1.6* 2.3* 2.8       Coags  Lab Results   Component Value Date    INR 1.1 11/13/2018    INR 1.1 11/12/2018    INR 1.1 01/06/2017    APTT 29.9 11/13/2018    APTT 32.2 (H) 11/12/2018    APTT 33.8 (H) 01/06/2017     Recent Labs   Lab 11/12/18  1342 11/13/18  1018   INR 1.1 1.1   APTT 32.2* 29.9       A1c:   Lab Results   Component Value Date    HGBA1C 4.8 11/12/2018   , Last Gluc:   Recent Labs   Lab 11/17/18  2048 11/18/18  0620 11/18/18  1126 11/18/18  1541 11/18/18  2017 11/19/18  0617   POCTGLUCOSE 111* 92 107 105 108 107       TSH:   Lab Results   Component Value Date    TSH 0.983 11/12/2018         Cardiac Enzymes  No results for input(s): TROPONINI, CPKMB, CPK in the last 72 hours.      Urinalysis  Urinalysis  No results for input(s): COLORU, CLARITYU, SPECGRAV, PHUR, PROTEINUA, GLUCOSEU, BILIRUBINCON, BLOODU, WBCU, RBCU, BACTERIA, MUCUS, NITRITE, LEUKOCYTESUR, UROBILINOGEN, HYALINECASTS in the last 24 hours.  No results for input(s): COLORU, CLARITYU, SPECGRAV, PHUR, PROTEINUA, GLUCOSEU, BLOODU, WBCU, RBCU, BACTERIA, MUCUS in the last 24 hours.    Invalid input(s):  BILIRUBINCON    Micro  Microbiology Results (last 7 days)     ** No results found for the last 168 hours. **             ABG  No results for input(s): PH, PCO2, PO2, HCO3, POCSATURATED, BE in the last 168 hours.      Imaging   Imaging Results    None         IP CONSULT TO NEPHROLOGY-[facility]  IP CONSULT TO ORTHOPEDIC SURGERY  IP CONSULT TO CARDIOLOGY-[facility]  IP CONSULT TO ANESTHESIOLOGY  IP CONSULT TO NEPHROLOGY-[facility]    ASSESSMENT/PLAN:   Right femoral neck fracture   -Diagnosed by xray   -Right Leg externally rotated on exam   -No notable neurologic deficits   -Popliteal and dorsalis pedis pulses intact  -Orthopedics consulted, s/p Hemiarthroplasty of Right Hip  -Continue Pain control as tolerated   working with pt/ot and did well yesterday  PT OT today      Heart Failure with  preserved EF   -Previous TTE shows diastolic dysfunction. EF 55-60%. (9/2016)   -Will repeat TTE today.   -EKG today sinus  - consulted cards, f/u recs, cleared by Dr. Curiel before the surgery  - ischemic evaluation at later date after recovery     HTN  -Home regimen: Hydralizine 50mg PO BID, Isosorbide Mono-nitrate 60mg PO qHs, Lisinopril 5mg PO daily, Carvedilol 6.25mg PO BID, Amlodipine 10mg PO daily. Eliquis 10mg PO BID and Atorvastatin 80mg PO BID  -Will adjust accordingly following assement of vitals and fluid status  -BP WNL this am       CKD Grade 4  GFR 26   CR bump, will trend  Will continue to monitor  Will consult nephrology - Preparation for future dialysis?? not during this admission.  Cr improving     GERD  -dc'd PPI, and contineu famotidine      Arthritis   morphin 2mg prn q2hr, and norco 10 scheduled will give before pt/ot        Hyperkalemia    Albuterol x1  Will resume ACE for how         PPx: SCDs /PENELOPE hose  Code: Full     Dc'd IVF per combined HF, pt tolerates po. Ekg sinus serjio,  Cxr, INR, PT/PTT, trops, bnp slightly higher s/p 1dose lasix, H/H stable, 2DE combined HF 35~40 with DD.      Dispo:   Working w/ PT/OT s/p Hemiarthroplasty of Right Hip, nephro on board, BP stable 120s  Just on BB. Placement: [facility]       Jose Mayes MD

## 2018-11-19 NOTE — PROGRESS NOTES
[facility]  Progress   Note    CC:   Patient is a 83 y.o. female who presents to to the ED as a transfer from [facility] 2/2 to lack of orthopedic coverage.. Patient stood up from her chair and fell on to her right side when attempting to take a step. Patient denies any dizziness or LOC prior to fall or any head trauma, Patient was found to have a right femoral neck fracture.        Subjective:   No complains   S/p hip surgery 11/13/18   Past Medical History:   Diagnosis Date    Arthritis     Elevated cholesterol     GERD (gastroesophageal reflux disease)     History of DVT (deep vein thrombosis) 2016    RUE    Hypertension       ?  Adilia  has a past medical history of Arthritis, Elevated cholesterol, GERD (gastroesophageal reflux disease), History of DVT (deep vein thrombosis) (2016), and Hypertension.  Adilia  has a past surgical history that includes Hysterectomy; Hernia repair; ORIF femur fracture (Left, 12/2016); HEMIARTHROPLASTY, HIP (Right, 11/13/2018); REMOVAL OF EXTERNAL FIXATION DEVICE (Left, 3/10/2017); APPLICATION-CAST LONG LEG (Left, 3/10/2017); REMOVAL-SUTURE (Left, 3/10/2017); IRRIGATION AND DEBRIDEMENT LOWER EXTREMITY (Left, 1/7/2017); PLACEMENT SPACER-ANTIBIOTIC (Left, 1/7/2017); IRRIGATION AND DEBRIDEMENT LOWER EXTREMITY; placement of antibiotic beads (Left, 12/27/2016); REMOVAL-HARDWARE-LEG (Left, 12/27/2016); APPLICATION-EXTERNAL FIXATION DEVICE; knee spanning (Left, 12/27/2016); PLACEMENT SPACER-ANTIBIOTIC (Left, 12/27/2016); IRRIGATION AND DEBRIDEMENT LOWER EXTREMITY; possible antibiotic bead placement; possible wound vac (Left, 12/26/2016); OPEN REDUCTION INTERNAL FIXATION-FEMUR (Left, 12/9/2016); LAPAROSCOPY-DIAGNOSTIC (N/A, 9/19/2016); EXPLORATORY-LAPAROTOMY, possible bowel resection (N/A, 9/19/2016); and LYSIS-ADHESION (N/A, 9/19/2016).  Her family history is not on file.  Adilia  reports that  has never smoked. She does not have any smokeless tobacco history  "on file. She reports that she does not drink alcohol or use drugs.  No current facility-administered medications on file prior to encounter.      Current Outpatient Medications on File Prior to Encounter   Medication Sig Dispense Refill    amlodipine (NORVASC) 10 MG tablet Take 10 mg by mouth every evening.      apixaban (ELIQUIS) 5 mg Tab Take 5 mg by mouth once daily.      atorvastatin (LIPITOR) 80 MG tablet Take 80 mg by mouth once daily.      hydrALAZINE (APRESOLINE) 50 MG tablet Take 50 mg by mouth 2 (two) times daily.      isosorbide mononitrate (IMDUR) 60 MG 24 hr tablet Take 60 mg by mouth once daily.      lisinopril (PRINIVIL,ZESTRIL) 5 MG tablet Take 5 mg by mouth once daily.      hydrocodone-acetaminophen 5-325mg (NORCO) 5-325 mg per tablet Take 2 tablets by mouth every 4 (four) hours as needed. 50 tablet 0     Aldonia is allergic to celebrex [celecoxib].    ?    Objective:   /74   Pulse 70   Temp 98.3 °F (36.8 °C)   Resp 15   Ht 5' 4" (1.626 m)   Wt 67.5 kg (148 lb 13 oz)   LMP  (LMP Unknown)   SpO2 96%   Breastfeeding? No   BMI 25.54 kg/m²   Gen - NAD, A+Ox4, not confused  Gait - normal  HEENT/ NECK - EOMI/, NCAT, no JVD   CVS - RRR, no m/r/s3/s4  Resp - CTAB, no w/r/r  Abd - soft, NT, ND   Ext - edema + 2   Psych - normal affect and behavior  Neuro - no asterixis  Laboratory:  Recent Results (from the past 24 hour(s))   POCT glucose    Collection Time: 11/18/18 11:26 AM   Result Value Ref Range    POCT Glucose 107 70 - 110 mg/dL   Basic metabolic panel    Collection Time: 11/18/18  1:49 PM   Result Value Ref Range    Sodium 138 136 - 145 mmol/L    Potassium 5.8 (H) 3.5 - 5.1 mmol/L    Chloride 106 95 - 110 mmol/L    CO2 26 23 - 29 mmol/L    Glucose 120 (H) 70 - 110 mg/dL    BUN, Bld 35 (H) 8 - 23 mg/dL    Creatinine 2.1 (H) 0.5 - 1.4 mg/dL    Calcium 10.7 (H) 8.7 - 10.5 mg/dL    Anion Gap 6 (L) 8 - 16 mmol/L    eGFR if African American 25 (A) >60 mL/min/1.73 m^2    eGFR if non "  22 (A) >60 mL/min/1.73 m^2   POCT glucose    Collection Time: 11/18/18  3:41 PM   Result Value Ref Range    POCT Glucose 105 70 - 110 mg/dL   POCT glucose    Collection Time: 11/18/18  8:17 PM   Result Value Ref Range    POCT Glucose 108 70 - 110 mg/dL   CBC auto differential    Collection Time: 11/19/18  5:14 AM   Result Value Ref Range    WBC 5.79 3.90 - 12.70 K/uL    RBC 2.95 (L) 4.00 - 5.40 M/uL    Hemoglobin 8.6 (L) 12.0 - 16.0 g/dL    Hematocrit 28.2 (L) 37.0 - 48.5 %    MCV 96 82 - 98 fL    MCH 29.2 27.0 - 31.0 pg    MCHC 30.5 (L) 32.0 - 36.0 g/dL    RDW 14.6 (H) 11.5 - 14.5 %    Platelets 207 150 - 350 K/uL    MPV 11.3 9.2 - 12.9 fL    Gran # (ANC) 3.3 1.8 - 7.7 K/uL    Lymph # 1.3 1.0 - 4.8 K/uL    Mono # 0.9 0.3 - 1.0 K/uL    Eos # 0.3 0.0 - 0.5 K/uL    Baso # 0.01 0.00 - 0.20 K/uL    Gran% 57.5 38.0 - 73.0 %    Lymph% 21.9 18.0 - 48.0 %    Mono% 14.7 4.0 - 15.0 %    Eosinophil% 5.5 0.0 - 8.0 %    Basophil% 0.2 0.0 - 1.9 %    Differential Method Automated    Comprehensive Metabolic Panel (CMP)    Collection Time: 11/19/18  5:14 AM   Result Value Ref Range    Sodium 139 136 - 145 mmol/L    Potassium 5.3 (H) 3.5 - 5.1 mmol/L    Chloride 106 95 - 110 mmol/L    CO2 26 23 - 29 mmol/L    Glucose 95 70 - 110 mg/dL    BUN, Bld 34 (H) 8 - 23 mg/dL    Creatinine 2.1 (H) 0.5 - 1.4 mg/dL    Calcium 10.2 8.7 - 10.5 mg/dL    Total Protein 5.6 (L) 6.0 - 8.4 g/dL    Albumin 2.3 (L) 3.5 - 5.2 g/dL    Total Bilirubin 0.5 0.1 - 1.0 mg/dL    Alkaline Phosphatase 75 55 - 135 U/L    AST 30 10 - 40 U/L    ALT 7 (L) 10 - 44 U/L    Anion Gap 7 (L) 8 - 16 mmol/L    eGFR if African American 25 (A) >60 mL/min/1.73 m^2    eGFR if non African American 22 (A) >60 mL/min/1.73 m^2   Magnesium    Collection Time: 11/19/18  5:14 AM   Result Value Ref Range    Magnesium 1.7 1.6 - 2.6 mg/dL   Phosphorus    Collection Time: 11/19/18  5:14 AM   Result Value Ref Range    Phosphorus 2.8 2.7 - 4.5 mg/dL   POCT glucose     Collection Time: 11/19/18  6:17 AM   Result Value Ref Range    POCT Glucose 107 70 - 110 mg/dL     Recent Labs   Lab 11/17/18  0453 11/18/18  0512 11/19/18  0514   WBC 7.46 6.54 5.79   HGB 8.8* 8.1* 8.6*   HCT 28.3* 27.0* 28.2*   * 178 207   MCV 95 95 96     Recent Labs   Lab 11/17/18  0454  11/18/18  0512 11/18/18  1349 11/19/18  0514      < > 139 138 139   K 5.9*   < > 5.3* 5.8* 5.3*      < > 108 106 106   CO2 23   < > 25 26 26   BUN 35*   < > 36* 35* 34*      < > 96 120* 95   CALCIUM 9.4   < > 10.1 10.7* 10.2   MG 2.0  --  2.0  --  1.7   PHOS 1.6*  --  2.3*  --  2.8    < > = values in this interval not displayed.     Recent Labs   Lab 11/17/18  0454 11/18/18  0512 11/19/18  0514   PROT 5.8* 5.7* 5.6*   ALBUMIN 2.4* 2.3* 2.3*   BILITOT 0.4 0.5 0.5   AST 28 31 30   ALT <5* 5* 7*   ALKPHOS 78 77 75     Recent Labs   Lab 11/12/18  1342 11/13/18  1018   INR 1.1 1.1     Cardiac:   Recent Labs   Lab 11/13/18  1005 11/13/18  1018 11/13/18  1323 11/15/18  1026   TROPONINI 0.023 0.014 0.006  --    BNP  --   --  717* 189*     FLP:   Lab Results   Component Value Date    CHOL 129 11/29/2006    HDL 38.0 (L) 11/29/2006    LDLCALC 72.4 11/29/2006    TRIG 93 11/29/2006    CHOLHDL 29.5 11/29/2006     DM:   Lab Results   Component Value Date    HGBA1C 4.8 11/12/2018    LDLCALC 72.4 11/29/2006    CREATININE 2.1 (H) 11/19/2018     Thyroid:   Lab Results   Component Value Date    TSH 0.983 11/12/2018     Anemia: No results found for: IRON, TIBC, FERRITIN, [id number], FOLATE  Urinalysis:   Lab Results   Component Value Date    LABURIN No growth 01/13/2017    COLORU Yellow 11/14/2018    SPECGRAV 1.020 11/14/2018    NITRITE Negative 11/14/2018    KETONESU Negative 11/14/2018    UROBILINOGEN Negative 11/14/2018     Assessment:   83 to f with pmh of HFpEF, HTN, CKD 4. Patient stood up from her chair and fell on to her right side when attempting to take a step.  We were consulted in a CKD  patient stable that went  to surgery. Renal function was stable after surgery we sing off. Now we were re consulted due  to SUMI. she  receive 500 ml of nss during the sumi. She was volume overload  then receive a dose of lasix. Now patient is today euvolemic. Patient with good urine out put, not more lasix. We will follow up labs     Patient renal function near her baseline. Possible need a dose of lasix of 20  mg po bid to increase her urine output and maintain her potassium in her normal levels    RIGHT HiP FRACTURE   SUMI; not clear the cause. Getting better.   CKD 3-4 GFR 36-30 AT BASELINE possible 2/2 htn   Cr baseline 1.8-2.0   - cr 2.5   - ua trace protein / blood trace    - UA previously year consistently negative   - p/c 0.64  HTN   HFpEF   Hyperkalemia   Plan:   Renal function getting better close to baseline   Lasix 20 mg po bid   Sing off   Follow up at discharge with [facility]              Ruiz Vasques  [facility] PGY5    ?  ?

## 2018-11-19 NOTE — PT/OT/SLP PROGRESS
Physical Therapy  Visit Attempt    Patient Name:  Adilia Rosas   MRN:  [id number]    Patient not seen today secondary to pt's daughter deferring therapy at this time, reporting pt is planning on being transferred to SNF today, will get therapy there, and wants to rest before she discharges.   Educated pt and pt's family that pt may not receive therapy at SNF this date depending on what time she is admitted but pt's daughter is adamant she will receive therapy at SNF this date. Pt rating R hip pain at 4/10 and stating she is comfortable in bed and wants to rest at this time. Even supine level LE exercises deferred, pts' daughter reporting therapy can check later to see if pt is still here and wants to complete therapy.   Will follow-up as able.    Cora Daniels, PT   11/19/2018

## 2018-11-19 NOTE — MEDICAL/APP STUDENT
[facility]  Discharge Summary      Patient Name: Adilia Rosas  MRN: [id number]  Admission Date: 11/12/2018  Hospital Length of Stay: 7 days  Discharge Date and Time:  11/19/2018 12:24 PM  Attending Physician: Oksana Sow MD   Discharging Provider:   Primary Care Provider: Ely Dubois MD    Hospital Course: Adilia Rosas is a 84 yo female with PMHx of HTN, HLD, CHF, CKD stage 3b, h/o DVT (2016), who presented to the ED on 11/12/18 via EMS from [facility]. The patient reported that she had fallen on her right hip on ground level, and EMS had brought her to [facility] where x-rays revealed a right femoral neck fracture. [facility] did not have orthopedic coverage on that day, and transfer center arranged for the patient to be sent here because the patient has been seen and operated on by Dr. Dent with [facility]. Patient reported persistent right hip pain described as aching and diffuse, worse with movement and better with rest. Denied focal weakness or numbness. Denied chest pain or shortness of breath. She hadn't had anything to drink or eat since the day before because she was scheduled for a stress test that morning.     Blood pressure was elevated at 189/91. She exhibited trace pitting edema to bilateral lower extremities and tenderness. Tender to palpation on right hip, pelvis was stable to compression. EKG within normal limits, x-ray from outside hospital was consistent with right femoral neck fracture. Patient was given morphine and zofran for pain control in the ED. [facility] and [facility] were consulted and she was admitted in stable condition. Patient reports being on plavix for bilateral DVTs. Lovenox added to medication list.     Patient admitted to family medicine for surgical clearance prior to operative management. Planned for right hip hemiarthroplasty following medical optimization. NPO at midnight.     On 11/13/18,  nephrology and cardiology were consulted. Right hip pain but controlled with pain medications. H/H 11.1/36.6. K 5.5. GFR 30. Creatinine 1.8 (baseline 1.6-1.9). BP down to 130s/70s HR 50s-60s. Troponin 0.023 and BNP pending. Repeat TTE and EKG. JVD at 10 cm and bilateral lower extremity edema. Discontinued IVF due to combined HF. EKG sinus bradycardia. Cardiology cleared the patient for surgery. Right hemiarthroplasty performed without complications immediately post operation.     On 11/14/18, the patient was stable, feeling well, tolerating PO, pain well controlled on morphine and norco, gardner in place with good urine output. Had a breathing treatment for slight high K, asymptomatic EKG no peaked T waves. Started PT/OT.     On 11/15/18, patient feeling good, tolerating PO, pain tolerable. Passed some flatus but no BM. Female cath with good UOP. On breathing neb for slightly elevated K. EKG sinus. MAP was 66 and received fluid boluses overnight. Became more edematous in the legs, creatinine bump. GFR 26.     On 11/16/18, the patient reported feeling really good, tolerating PO, tolerating pain. Working with PT/OT. Passed flatus and had a BM. Good UOP. Lower leg edema improved. BNP elevated. Gentle diuresing with lasix and weaning off oxygen.     On 11/17/18, patient reports feeling well, no shortness of breath, tolerating PO. Working with PT/OT. Elevated K at 5.9, got albuterol treatment, calcium, insulin and dextrose. Held the ACE inhibitor.     On 11/18/18, patient reports feeling well, tolerating PO and nasal cannula for comfort. Working with PT/OT. Albuterol treatment for hyperkalemia. BP stable 120s. On metoprolol.     On 11/19/18, the patient remained stable with pain well controlled. Tolerating PO, ambulating, vitals stable. Lisinopril resumed. Kidney function stable. Patient placed at SNF for further work physical therapy.     Procedure(s) (LRB):  HEMIARTHROPLASTY, HIP (Right)      Indwelling Lines/Drains at  time of discharge:   Lines/Drains/Airways     Drain            Female External Urinary Catheter 11/14/18 1325 4 days                  Consults:   Consults (From admission, onward)        Status Ordering Provider     Inpatient consult to Anesthesiology  Once     Provider:  Fidelia Weston MD    Acknowledged RODRIGO ÁLVAREZ     Inpatient consult to Cardiology-[facility]  Once     Provider:  Yeimi Weber MD    Completed RODRIGO ÁLVAREZ     Inpatient consult to Nephrology-[facility]  Once     Provider:  Nilda Eller MD    Acknowledged RODRIGO ÁLVAREZ     Inpatient consult to Nephrology-[facility]  Once     Provider:  (Not yet assigned)    Acknowledged RODRIGO ÁLVAREZ     Orthopedic Surgery  Once     Provider:  (Not yet assigned)    Acknowledged MARIELLA CAMARGO          Significant Diagnostic Studies: Labs:   BMP:   Recent Labs   Lab 11/18/18  0512 11/18/18  1349 11/19/18  0514   GLU 96 120* 95    138 139   K 5.3* 5.8* 5.3*    106 106   CO2 25 26 26   BUN 36* 35* 34*   CREATININE 2.1* 2.1* 2.1*   CALCIUM 10.1 10.7* 10.2   MG 2.0  --  1.7   , CMP   Recent Labs   Lab 11/18/18  0512 11/18/18  1349 11/19/18  0514    138 139   K 5.3* 5.8* 5.3*    106 106   CO2 25 26 26   GLU 96 120* 95   BUN 36* 35* 34*   CREATININE 2.1* 2.1* 2.1*   CALCIUM 10.1 10.7* 10.2   PROT 5.7*  --  5.6*   ALBUMIN 2.3*  --  2.3*   BILITOT 0.5  --  0.5   ALKPHOS 77  --  75   AST 31  --  30   ALT 5*  --  7*   ANIONGAP 6* 6* 7*   ESTGFRAFRICA 25* 25* 25*   EGFRNONAA 22* 22* 22*   , CBC   Recent Labs   Lab 11/18/18  0512 11/19/18  0514   WBC 6.54 5.79   HGB 8.1* 8.6*   HCT 27.0* 28.2*    207   , Lipid Panel   Lab Results   Component Value Date    CHOL 129 11/29/2006    HDL 38.0 (L) 11/29/2006    LDLCALC 72.4 11/29/2006    TRIG 93 11/29/2006    CHOLHDL 29.5 11/29/2006    and Troponin   Recent Labs   Lab 11/13/18  1323   TROPONINI 0.006     Microbiology:   Urine Culture    Lab Results   Component Value Date    LABURIN No growth  01/13/2017     Radiology: X-Ray: CXR: X-Ray Chest 1 View (CXR):   Results for orders placed or performed during the hospital encounter of 11/12/18   X-Ray Chest 1 View    Narrative    EXAMINATION:  XR CHEST 1 VIEW    CLINICAL HISTORY:  pitting edema;    TECHNIQUE:  Single frontal view of the chest was performed.    FINDINGS:  The lungs are significant for mild amount of scattered interstitial prominence.  There is a mild amount of bilateral perihilar opacity as has been present on multiple prior examinations cannot exclude pulmonary vascular congestion.  There is no pneumothorax or pleural fluid.  The cardiac silhouette appears prominent to mildly enlarged.  There is calcification of the aorta which is tortuous.  The osseous structures demonstrate degenerative change.      Impression    As above.      Electronically signed by: Priyank Decker MD  Date:    11/15/2018  Time:    11:26     X-Ray Hip 2 or 3 views Right   Status: Final result   MyChart Results Release     20lines Status: Pending    PACS Images for LegiList[employer] Viewer      Show images for X-Ray Hip 2 or 3 views Right   PACS Images for ViTAL Pilger Viewer (Includes Kaseya Images)      Show images for X-Ray Hip 2 or 3 views Right   X-Ray Hip 2 or 3 views Right   Order: [id number]   Status:  Final result   Visible to patient:  No (Not Released) Next appt:  12/07/2018 at 08:30 AM in Cardiology (Yeimi Weber MD) Dx:  Closed fracture of neck of right femu...   Details     Reading Physician Reading Date Result Priority   Thad Kelly II, MD 11/14/2018       Narrative     EXAMINATION:  XR HIP 2 VIEW RIGHT    CLINICAL HISTORY:  s/p hemiarthroplasty; Fracture of unspecified part of neck of right femur, initial encounter for closed fracture    TECHNIQUE:  Multiple AP views of the right hip are submitted for interpretation.    COMPARISON:  Right femur radiographs 11/12/2018    FINDINGS:  Interval right total hip arthroplasty.  Alignment is near anatomic,  though evaluation is limited on these AP views due to technique.  Soft tissue gas around the right hip, not unexpected postop.  Parts of the pelvis in most of the sacrum are obscured by bowel gas and stool.  Advanced lower lumbar and left hip degenerative changes.      Impression       Status post right total hip arthroplasty, as above.      Electronically signed by: Rosette Chan  Date: 11/14/2018  Time: 08:16             Last Resulted: 11/14/18 08:16 Order Details View Encounter Lab and Collection Details Routing Result History            External Result Report     External Result Report   Narrative     EXAMINATION:  XR HIP 2 VIEW RIGHT    CLINICAL HISTORY:  s/p hemiarthroplasty; Fracture of unspecified part of neck of right femur, initial encounter for closed fracture    TECHNIQUE:  Multiple AP views of the right hip are submitted for interpretation.    COMPARISON:  Right femur radiographs 11/12/2018    FINDINGS:  Interval right total hip arthroplasty.  Alignment is near anatomic, though evaluation is limited on these AP views due to technique.  Soft tissue gas around the right hip, not unexpected postop.  Parts of the pelvis in most of the sacrum are obscured by bowel gas and stool.  Advanced lower lumbar and left hip degenerative changes.   Impression       Status post right total hip arthroplasty, as above.      Electronically signed by: Rosette Chan  Date: 11/14/2018  Time: 08:16    Encounter     View Encounter          Signed by     Signed Credentials Date/Time  Phone Pager   ROSETTE CHAN II, MD 11/14/2018 08:16 [phone]    Exam Details     Performed Procedure Technologist Supporting Staff Performing Physician   X-Ray Hip 2 or 3 views Right Isidro Temple, RT        Appointment Date/Status Modality Department    11/13/2018     Completed [facility] PORTXR1 [facility] XRAY IP       Begin Exam End Exam  End Exam Questionnaires   11/13/2018  7:16 PM 11/13/2018  8:19 PM  IMAGING END ALL      Reason For Exam    Priority: Routine   s/p hemiarthroplasty   Dx: Closed fracture of neck of right femur, initial encounter [S72.001A (ICD-10-CM)]   Order Report     Order Details     Pending Diagnostic Studies:     None        Final Active Diagnoses:    Diagnosis Date Noted POA    PRINCIPAL PROBLEM:  Fracture of femur [S72.90XA] 01/13/2017 Yes    Heart failure [I50.9]  Yes    Femoral neck fracture [S72.009A]  Yes    Hyperkalemia [E87.5]  Yes    Pulmonary hypertension [I27.20]  Yes    Diastolic dysfunction [I51.9]  Yes    Decreased cardiac ejection fraction [R93.1]  Yes    Fall on same level from slipping, tripping and stumbling without subsequent striking against object, initial encounter [W01.0XXA] 11/12/2018 Yes    Acute on chronic combined systolic and diastolic congestive heart failure, NYHA class 4 [I50.43] 10/16/2018 Yes    Chronic kidney disease (CKD) stage G3b/A1, moderately decreased glomerular filtration rate (GFR) between 30-44 mL/min/1.73 square meter and albuminuria creatinine ratio less than 30 mg/g [N18.3] 02/07/2017 Yes    Gastroesophageal reflux disease [K21.9] 01/10/2017 Yes    H/O myocardial ischemia [Z86.79]  Not Applicable    Essential hypertension [I10] 12/27/2016 Yes      Problems Resolved During this Admission:      Discharged Condition: stable    Disposition: Skilled Nursing Facility    Follow Up:  Follow-up Information     Ely Dubois MD In 1 week.    Specialty:  Internal Medicine  Why:  Hospital Follow up  Contact information:  [address]  [address]  [facility] [address]  [phone]             Paulo Eller DO. Schedule an appointment as soon as possible for a visit in 1 week.    Specialty:  Nephrology  Why:  For CKD hospital followup  Contact information:  [address]  [address]  [facility] [address] [phone]             Yeimi Weber MD. Schedule an appointment as soon as possible for a visit in 1 week.    Specialty:  Cardiology  Why:  Hospital  "follow up  Contact information:  [address]  [address]  [place] [address]  [phone]             Jose Antonio Franklin MD. Schedule an appointment as soon as possible for a visit in 1 week.    Specialty:  Orthopedic Surgery  Why:  For wound re-check, For suture removal s/p hip replacement  Contact information:  [address] [facility]  [address]  [place] [address]  [phone]                 Patient Instructions:      BATH/SHOWER CHAIR FOR HOME USE     Order Specific Question Answer Comments   Height: 5' 4" (1.626 m)    Weight: 67.5 kg (148 lb 13 oz)    Does patient have medical equipment at home? bedside commode    Does patient have medical equipment at home? hospital bed    Does patient have medical equipment at home? walker, rolling    Does patient have medical equipment at home? wheelchair    Length of need (1-99 months): 99    Type: With back      NEBULIZER FOR HOME USE     Order Specific Question Answer Comments   Height: 5' 4" (1.626 m)    Weight: 67.5 kg (148 lb 13 oz)    Does patient have medical equipment at home? bedside commode    Does patient have medical equipment at home? hospital bed    Does patient have medical equipment at home? walker, rolling    Does patient have medical equipment at home? wheelchair    Length of need (1-99 months): 99      Diet renal     Diet Cardiac     Notify your health care provider if you experience any of the following:  temperature >100.4     Notify your health care provider if you experience any of the following:  persistent nausea and vomiting or diarrhea     Notify your health care provider if you experience any of the following:  severe uncontrolled pain     Notify your health care provider if you experience any of the following:  redness, tenderness, or signs of infection (pain, swelling, redness, odor or green/yellow discharge around incision site)     Notify your health care provider if you experience any of the following:  difficulty breathing or increased cough "     Notify your health care provider if you experience any of the following:  severe persistent headache     Notify your health care provider if you experience any of the following:  worsening rash     Notify your health care provider if you experience any of the following:  persistent dizziness, light-headedness, or visual disturbances     Notify your health care provider if you experience any of the following:  increased confusion or weakness     Activity as tolerated     Medications:  Reconciled Home Medications:      Medication List      CHANGE how you take these medications    * albuterol 90 mcg/actuation inhaler  Commonly known as:  PROVENTIL/VENTOLIN HFA  Inhale 2 puffs into the lungs every 6 (six) hours as needed for Wheezing.  What changed:  when to take this     * albuterol sulfate 2.5 mg/0.5 mL Nebu  Take 5 mg by nebulization every 6 (six) hours while awake.  What changed:  You were already taking a medication with the same name, and this prescription was added. Make sure you understand how and when to take each.     ferrous gluconate 324 mg (37.5 mg iron) Tab  Take 1 tablet (324 mg total) by mouth daily with breakfast.  Start taking on:  11/20/2018  What changed:    · medication strength  · how much to take         * This list has 2 medication(s) that are the same as other medications prescribed for you. Read the directions carefully, and ask your doctor or other care provider to review them with you.            CONTINUE taking these medications    atorvastatin 80 MG tablet  Commonly known as:  LIPITOR  Take 80 mg by mouth once daily.     ELIQUIS 5 mg Tab  Generic drug:  apixaban  Take 5 mg by mouth once daily.     HYDROcodone-acetaminophen 5-325 mg per tablet  Commonly known as:  NORCO  Take 2 tablets by mouth every 4 (four) hours as needed.        STOP taking these medications    amLODIPine 10 MG tablet  Commonly known as:  NORVASC     benazepril 20 MG tablet  Commonly known as:  LOTENSIN     bisacodyl  10 mg Supp  Commonly known as:  DULCOLAX     clopidogrel 75 mg tablet  Commonly known as:  PLAVIX     hydrALAZINE 50 MG tablet  Commonly known as:  APRESOLINE     isosorbide mononitrate 60 MG 24 hr tablet  Commonly known as:  IMDUR     lisinopril 5 MG tablet  Commonly known as:  PRINIVIL,ZESTRIL     lubiprostone 24 MCG Cap  Commonly known as:  AMITIZA     magnesium 200 mg Tab     metoprolol tartrate 50 MG tablet  Commonly known as:  LOPRESSOR     mirtazapine 15 MG tablet  Commonly known as:  REMERON     oxyCODONE-acetaminophen 5-325 mg per tablet  Commonly known as:  PERCOCET     pantoprazole 40 MG tablet  Commonly known as:  PROTONIX     prednisoLONE acetate 1 % Drps  Commonly known as:  PRED FORTE     senna-docusate 8.6-50 mg 8.6-50 mg per tablet  Commonly known as:  PERICOLACE     VITAMIN C 500 MG tablet  Generic drug:  ascorbic acid (vitamin C)     vitamin D 1000 units Tab  Commonly known as:  VITAMIN D3          Time spent on the discharge of patient: >30 minutes         Haley Cart [facility]

## 2018-11-19 NOTE — PROGRESS NOTES
Report given to Angelina at [facility]. Patient being transported via [facility] stretcher to [facility] at this time.

## 2018-11-19 NOTE — PT/OT/SLP PROGRESS
Occupational Therapy      Patient Name:  Adilia Rosas   MRN:  [id number]    Patient not seen today secondary to pt found eating lunch in bed, all bags packed for transfer to SNF per pt's dtr. Pt declined therapy stating she wanted to rest for transfer to SNF. Informed pt that transfer may not happen until later in day, however, pt continues to refuse. Will follow-up at later time.    CARRIE Terrazas  11/19/2018

## 2018-11-19 NOTE — PLAN OF CARE
received clearance from Robby with [facility] bed stating patient can admit today for skilled level of care. Per Robby, facility orders have been reviewed and the patient will admit to Room 12. Bedside nurse Ingrid informed of discharge and can call report to [phone].    Per bedside nurse patient need stretcher transport. Transportation arranged with [facility] to  patient within the hour. Patient and daughter at bedside informed and agreeable with discharge today.    Packet placed at nurse station. Patient Choice Form and Medicare Important Message Forms signed and copy placed in chart.

## 2018-11-19 NOTE — PLAN OF CARE
[facility]    FACILITY TRANSFER ORDERS      Patient Name: Adilia Rosas  YOB: 1935    PCP: Ely Dubois MD   PCP Address: [address] [place] [address] [address] [facility] *  PCP Phone Number: [phone]  PCP Fax: [phone]    Encounter Date: 11/19/2018    Admit to: [facility]  Vital Signs:  Routine    Diagnoses:   Active Hospital Problems    Diagnosis  POA    *Fracture of femur [S72.90XA]  Yes    Heart failure [I50.9]  Yes    Femoral neck fracture [S72.009A]  Yes    Hyperkalemia [E87.5]  Yes    Pulmonary hypertension [I27.20]  Yes    Diastolic dysfunction [I51.9]  Yes    Decreased cardiac ejection fraction [R93.1]  Yes    Fall on same level from slipping, tripping and stumbling without subsequent striking against object, initial encounter [W01.0XXA]  Yes    Acute on chronic combined systolic and diastolic congestive heart failure, NYHA class 4 [I50.43]  Yes    Chronic kidney disease (CKD) stage G3b/A1, moderately decreased glomerular filtration rate (GFR) between 30-44 mL/min/1.73 square meter and albuminuria creatinine ratio less than 30 mg/g [N18.3]  Yes    Gastroesophageal reflux disease [K21.9]  Yes    H/O myocardial ischemia [Z86.79]  Not Applicable    Essential hypertension [I10]  Yes      Resolved Hospital Problems   No resolved problems to display.       Allergies:  Review of patient's allergies indicates:   Allergen Reactions    Celebrex [celecoxib] Other (See Comments)     Pain down her spine       Diet: cardiac diet and renal diet    Activities: Activity as tolerated    Nursing: NA     Labs: BMP weekly for potassium    CONSULTS:    Physical Therapy to evaluate and treat. , Occupational Therapy to evaluate and treat. and  to evaluate for community resources/long-range planning.    MISCELLANEOUS CARE:  Routine Skin for Bedridden Patients: Apply moisture barrier cream to all skin folds and wet areas in perineal  area daily and after baths and all bowel movements.    WOUND CARE ORDERS  None     Medications: Review discharge medications with patient and family and provide education.      Current Discharge Medication List      START taking these medications    Details   albuterol sulfate 2.5 mg/0.5 mL Nebu Take 5 mg by nebulization every 6 (six) hours while awake. Rescue  Qty: 5 mg, Refills: 0         CONTINUE these medications which have CHANGED    Details   albuterol (PROVENTIL/VENTOLIN HFA) 90 mcg/actuation inhaler Inhale 2 puffs into the lungs every 6 (six) hours as needed for Wheezing.  Qty: 1 Inhaler, Refills: 6      ferrous gluconate 324 mg (37.5 mg iron) Tab Take 1 tablet (324 mg total) by mouth daily with breakfast.  Qty: 30 tablet, Refills: 11         CONTINUE these medications which have NOT CHANGED    Details   apixaban (ELIQUIS) 5 mg Tab Take 5 mg by mouth once daily.      atorvastatin (LIPITOR) 80 MG tablet Take 80 mg by mouth once daily.      hydrocodone-acetaminophen 5-325mg (NORCO) 5-325 mg per tablet Take 2 tablets by mouth every 4 (four) hours as needed.  Qty: 50 tablet, Refills: 0         STOP taking these medications       amlodipine (NORVASC) 10 MG tablet Comments:   Reason for Stopping:         hydrALAZINE (APRESOLINE) 50 MG tablet Comments:   Reason for Stopping:         isosorbide mononitrate (IMDUR) 60 MG 24 hr tablet Comments:   Reason for Stopping:         lisinopril (PRINIVIL,ZESTRIL) 5 MG tablet Comments:   Reason for Stopping:         ascorbic acid, vitamin C, (VITAMIN C) 500 MG tablet Comments:   Reason for Stopping:         benazepril (LOTENSIN) 20 MG tablet Comments:   Reason for Stopping:         bisacodyl (DULCOLAX) 10 mg Supp Comments:   Reason for Stopping:         clopidogrel (PLAVIX) 75 mg tablet Comments:   Reason for Stopping:         lubiprostone (AMITIZA) 24 MCG Cap Comments:   Reason for Stopping:         magnesium 200 mg Tab Comments:   Reason for Stopping:         metoprolol  tartrate (LOPRESSOR) 50 MG tablet Comments:   Reason for Stopping:         mirtazapine (REMERON) 15 MG tablet Comments:   Reason for Stopping:         oxycodone-acetaminophen (PERCOCET) 5-325 mg per tablet Comments:   Reason for Stopping:         pantoprazole (PROTONIX) 40 MG tablet Comments:   Reason for Stopping:         prednisoLONE acetate (PRED FORTE) 1 % DrpS Comments:   Reason for Stopping:         senna-docusate 8.6-50 mg (PERICOLACE) 8.6-50 mg per tablet Comments:   Reason for Stopping:         vitamin D 1000 units Tab Comments:   Reason for Stopping:                    _________________________________  Melanie Mayes MD  11/19/2018

## 2018-11-19 NOTE — PLAN OF CARE
Problem: Patient Care Overview  Goal: Plan of Care Review  Outcome: Ongoing (interventions implemented as appropriate)  Patient aaox4, safety measures implemented, call light in reach, bed alarm set, daughter at bedside, no complaints of pain or discomfort, patient has pure wick, which was changed out on 11/19., will continue to monitor.

## 2018-11-19 NOTE — PLAN OF CARE
Yeimi Weber MD  Go on 12/7/2018  8:30am.Hospital follow up  [address]  [address]  [facility] [address]  [phone]   Jose Antonio Franklin MD  Go on 11/28/2018  Time- 11:15am.For wound re-check, For suture removal s/p hip replacement  [address]  [address]  [place] [address]  [phone]   [facility]  Go on 11/19/2018  SNF  [address] [facility] [address]  [phone]        11/19/18 1458   Final Note   Assessment Type Final Discharge Note   Anticipated Discharge Disposition SNF   What phone number can be called within the next 1-3 days to see how you are doing after discharge? [phone]   Hospital Follow Up  Appt(s) scheduled? Yes   Discharge plans and expectations educations in teach back method with documentation complete? Yes   Right Care Referral Info   Post Acute Recommendation SNF / Sub-Acute Rehab   Facility Name [facility]

## 2018-11-20 ENCOUNTER — TELEPHONE (OUTPATIENT)
Dept: CARDIOLOGY | Facility: CLINIC | Age: 83
End: 2018-11-20

## 2018-11-20 NOTE — TELEPHONE ENCOUNTER
----- Message from Aury Parker sent at 11/19/2018  2:19 PM CST -----  Contact: [phone]  Pt its requesting a hospital follow up in a week . Please advise

## 2018-12-04 NOTE — DISCHARGE SUMMARY
Discharge Summary  [facility] [place]    Attending Physician: Dr. Yaroshevsky Severyn  Resident: Melanie Mayes    Date of Admit: 11/12/2018  Date of Discharge: 11/19/2018  Condition: Stable  Disposition: Skilled Nursing Facility  Discharge Diagnoses     Patient Active Problem List   Diagnosis    Closed fracture of distal end of left femur    Postoperative infection    Essential hypertension    Staphylococcus aureus infection    Hypomagnesemia    Hypophosphatemia    Anemia    Post op infection    Fracture of femur    Gastroesophageal reflux disease    Arthritis    H/O myocardial ischemia    Chronic kidney disease (CKD) stage G3b/A1, moderately decreased glomerular filtration rate (GFR) between 30-44 mL/min/1.73 square meter and albuminuria creatinine ratio less than 30 mg/g    Acute on chronic combined systolic and diastolic congestive heart failure, NYHA class 4    Hypertensive urgency    Cardiomyopathy    Fall on same level from slipping, tripping and stumbling without subsequent striking against object, initial encounter    Femoral neck fracture    Hyperkalemia    Pulmonary hypertension    Diastolic dysfunction    Decreased cardiac ejection fraction    Heart failure     Consultants and Procedures   IP CONSULT TO [facility]    Brief History of Present Illness     Adilia Rosas is a 82 yo female with PMHx of HTN, HLD, CHF, CKD stage 3b, h/o DVT (2016), who presented to the ED on 11/12/18 via EMS from [facility]. The patient reported that she had fallen on her right hip on ground level, and EMS had brought her to [facility] where x-rays revealed a right femoral neck fracture. [facility] did not have orthopedic coverage on that day, and transfer center arranged for the patient to be sent here because the patient has been seen and operated on by Dr. Dent with [facility]. Patient reported persistent right hip pain described as  aching and diffuse, worse with movement and better with rest. Denied focal weakness or numbness. Denied chest pain or shortness of breath. She hadn't had anything to drink or eat since the day before because she was scheduled for a stress test that morning.      Blood pressure was elevated at 189/91. She exhibited trace pitting edema to bilateral lower extremities and tenderness. Tender to palpation on right hip, pelvis was stable to compression. EKG within normal limits, x-ray from outside hospital was consistent with right femoral neck fracture. Patient was given morphine and zofran for pain control in the ED. [facility] consulted and she was admitted in stable condition. Patient reports being on plavix for bilateral DVTs. Lovenox added to medication list.      Patient admitted to family medicine for surgical clearance prior to operative management. Planned for right hip hemiarthroplasty following medical optimization. NPO at midnight.          Hospital Course By Problem with Pertinent Findings     Adilia Rosas is a 82 yo female with PMHx of HTN, HLD, CHF, CKD stage 3b, h/o DVT (2016), who presented to the ED on 11/12/18 via EMS from [facility]. The patient reported that she had fallen on her right hip on ground level, and EMS had brought her to [facility] where x-rays revealed a right femoral neck fracture. [facility] did not have orthopedic coverage on that day, and transfer center arranged for the patient to be sent here because the patient has been seen and operated on by Dr. Dent with [facility]. Patient reported persistent right hip pain described as aching and diffuse, worse with movement and better with rest. Denied focal weakness or numbness. Denied chest pain or shortness of breath. She hadn't had anything to drink or eat since the day before because she was scheduled for a stress test that morning.      Blood pressure was  elevated at 189/91. She exhibited trace pitting edema to bilateral lower extremities and tenderness. Tender to palpation on right hip, pelvis was stable to compression. EKG within normal limits, x-ray from outside hospital was consistent with right femoral neck fracture. Patient was given morphine and zofran for pain control in the ED. [facility] were consulted and she was admitted in stable condition. Patient reports being on plavix for bilateral DVTs. Lovenox added to medication list.      Patient admitted to family medicine for surgical clearance prior to operative management. Planned for right hip hemiarthroplasty following medical optimization. NPO at midnight.      On 11/13/18, nephrology and cardiology were consulted. Right hip pain but controlled with pain medications. H/H 11.1/36.6. K 5.5. GFR 30. Creatinine 1.8 (baseline 1.6-1.9). BP down to 130s/70s HR 50s-60s. Troponin 0.023 and BNP pending. Repeat TTE and EKG. JVD at 10 cm and bilateral lower extremity edema. Discontinued IVF due to combined HF. EKG sinus bradycardia. Cardiology cleared the patient for surgery. Right hemiarthroplasty performed without complications immediately post operation.      On 11/14/18, the patient was stable, feeling well, tolerating PO, pain well controlled on morphine and norco, gardner in place with good urine output. Had a breathing treatment for slight high K, asymptomatic EKG no peaked T waves. Started PT/OT.      On 11/15/18, patient feeling good, tolerating PO, pain tolerable. Passed some flatus but no BM. Female cath with good UOP. On breathing neb for slightly elevated K. EKG sinus. MAP was 66 and received fluid boluses overnight. Became more edematous in the legs, creatinine bump. GFR 26.      On 11/16/18, the patient reported feeling really good, tolerating PO, tolerating pain. Working with PT/OT. Passed flatus and had a BM. Good UOP. Lower leg edema improved. BNP elevated. Gentle diuresing  with lasix and weaning off oxygen.      On 11/17/18, patient reports feeling well, no shortness of breath, tolerating PO. Working with PT/OT. Elevated K at 5.9, got albuterol treatment, calcium, insulin and dextrose. Held the ACE inhibitor.      On 11/18/18, patient reports feeling well, tolerating PO and nasal cannula for comfort. Working with PT/OT. Albuterol treatment for hyperkalemia. BP stable 120s. On metoprolol.      On 11/19/18, the patient remained stable with pain well controlled. Tolerating PO, ambulating, vitals stable. Lisinopril resumed. Kidney function stable. Patient placed at SNF for further work physical therapy.         Discharge Medications     Discharge Medication List as of 11/19/2018 12:27 PM      START taking these medications    Details   albuterol sulfate 2.5 mg/0.5 mL Nebu Take 6 mLs (5 mg total) by nebulization every 6 (six) hours while awake., Starting Mon 11/19/2018, Normal         CONTINUE these medications which have CHANGED    Details   albuterol (PROVENTIL/VENTOLIN HFA) 90 mcg/actuation inhaler Inhale 2 puffs into the lungs every 6 (six) hours as needed for Wheezing., Starting Mon 11/19/2018, Until Thu 11/14/2019, Normal      ferrous gluconate 324 mg (37.5 mg iron) Tab Take 1 tablet (324 mg total) by mouth daily with breakfast., Starting Tue 11/20/2018, Until Wed 11/20/2019, OTC         CONTINUE these medications which have NOT CHANGED    Details   apixaban (ELIQUIS) 5 mg Tab Take 5 mg by mouth once daily., Historical Med      atorvastatin (LIPITOR) 80 MG tablet Take 80 mg by mouth once daily., Until Discontinued, Historical Med      hydrocodone-acetaminophen 5-325mg (NORCO) 5-325 mg per tablet Take 2 tablets by mouth every 4 (four) hours as needed., Starting 12/12/2016, Until Discontinued, Print         STOP taking these medications       amlodipine (NORVASC) 10 MG tablet Comments:   Reason for Stopping:         hydrALAZINE (APRESOLINE) 50 MG tablet Comments:   Reason for Stopping:  "        isosorbide mononitrate (IMDUR) 60 MG 24 hr tablet Comments:   Reason for Stopping:         lisinopril (PRINIVIL,ZESTRIL) 5 MG tablet Comments:   Reason for Stopping:         ascorbic acid, vitamin C, (VITAMIN C) 500 MG tablet Comments:   Reason for Stopping:         benazepril (LOTENSIN) 20 MG tablet Comments:   Reason for Stopping:         bisacodyl (DULCOLAX) 10 mg Supp Comments:   Reason for Stopping:         clopidogrel (PLAVIX) 75 mg tablet Comments:   Reason for Stopping:         lubiprostone (AMITIZA) 24 MCG Cap Comments:   Reason for Stopping:         magnesium 200 mg Tab Comments:   Reason for Stopping:         metoprolol tartrate (LOPRESSOR) 50 MG tablet Comments:   Reason for Stopping:         mirtazapine (REMERON) 15 MG tablet Comments:   Reason for Stopping:         oxycodone-acetaminophen (PERCOCET) 5-325 mg per tablet Comments:   Reason for Stopping:         pantoprazole (PROTONIX) 40 MG tablet Comments:   Reason for Stopping:         prednisoLONE acetate (PRED FORTE) 1 % DrpS Comments:   Reason for Stopping:         senna-docusate 8.6-50 mg (PERICOLACE) 8.6-50 mg per tablet Comments:   Reason for Stopping:         vitamin D 1000 units Tab Comments:   Reason for Stopping:             Discharge Information:   Patient Instructions:   Discharge Procedure Orders   BATH/SHOWER CHAIR FOR HOME USE     Order Specific Question Answer Comments   Height: 5' 4" (1.626 m)    Weight: 67.5 kg (148 lb 13 oz)    Does patient have medical equipment at home? bedside commode    Does patient have medical equipment at home? hospital bed    Does patient have medical equipment at home? walker, rolling    Does patient have medical equipment at home? wheelchair    Length of need (1-99 months): 99    Type: With back      NEBULIZER FOR HOME USE     Order Specific Question Answer Comments   Height: 5' 4" (1.626 m)    Weight: 67.5 kg (148 lb 13 oz)    Does patient have medical equipment at home? bedside commode    Does " patient have medical equipment at home? hospital bed    Does patient have medical equipment at home? walker, rolling    Does patient have medical equipment at home? wheelchair    Length of need (1-99 months): 99      Diet renal     Diet Cardiac     Notify your health care provider if you experience any of the following:  temperature >100.4     Notify your health care provider if you experience any of the following:  persistent nausea and vomiting or diarrhea     Notify your health care provider if you experience any of the following:  severe uncontrolled pain     Notify your health care provider if you experience any of the following:  redness, tenderness, or signs of infection (pain, swelling, redness, odor or green/yellow discharge around incision site)     Notify your health care provider if you experience any of the following:  difficulty breathing or increased cough     Notify your health care provider if you experience any of the following:  severe persistent headache     Notify your health care provider if you experience any of the following:  worsening rash     Notify your health care provider if you experience any of the following:  persistent dizziness, light-headedness, or visual disturbances     Notify your health care provider if you experience any of the following:  increased confusion or weakness     Activity as tolerated       Follow-up Information     Ely Dubois MD. Call in 1 week.    Specialty:  Internal Medicine  Why:  Hospital Follow up  Contact information:  [address]  [address]  [facility] [address]  [phone]             Paulo Eller DO. Schedule an appointment as soon as possible for a visit in 1 week.    Specialty:  Nephrology  Why:  For CKD hospital followup  Contact information:  [address]  [address]  [facility] [id number] [phone]             Yeimi Weber MD. Go on 12/7/2018.    Specialty:  Cardiology  Why:  8:30am.Hospital follow  up  Contact information:  [address]  [address]  [place] [address]  [phone]             Jose Antonio Franklin MD. Go on 11/28/2018.    Specialty:  Orthopedic Surgery  Why:  Time- 11:15am.For wound re-check, For suture removal s/p hip replacement  Contact information:  [address]  [address]  [place] [address]  [phone]             [facility]. Go on 11/19/2018.    Specialty:  Physical Therapy  Why:  [facility]  Contact information:  [address] [facility] [address]  [phone]

## 2018-12-07 ENCOUNTER — OFFICE VISIT (OUTPATIENT)
Dept: CARDIOLOGY | Facility: CLINIC | Age: 83
End: 2018-12-07
Payer: MEDICARE

## 2018-12-07 VITALS
OXYGEN SATURATION: 97 % | HEIGHT: 64 IN | HEART RATE: 61 BPM | DIASTOLIC BLOOD PRESSURE: 72 MMHG | SYSTOLIC BLOOD PRESSURE: 152 MMHG | BODY MASS INDEX: 25.54 KG/M2

## 2018-12-07 DIAGNOSIS — N18.32 CHRONIC KIDNEY DISEASE (CKD) STAGE G3B/A1, MODERATELY DECREASED GLOMERULAR FILTRATION RATE (GFR) BETWEEN 30-44 ML/MIN/1.73 SQUARE METER AND ALBUMINURIA CREATININE RATIO LESS THAN 30 MG/G: ICD-10-CM

## 2018-12-07 DIAGNOSIS — S72.001S CLOSED FRACTURE OF NECK OF RIGHT FEMUR, SEQUELA: ICD-10-CM

## 2018-12-07 DIAGNOSIS — Z86.79 H/O MYOCARDIAL ISCHEMIA: Primary | ICD-10-CM

## 2018-12-07 DIAGNOSIS — I50.42 CHRONIC COMBINED SYSTOLIC AND DIASTOLIC HEART FAILURE: ICD-10-CM

## 2018-12-07 DIAGNOSIS — I10 ESSENTIAL HYPERTENSION: ICD-10-CM

## 2018-12-07 DIAGNOSIS — I51.89 DIASTOLIC DYSFUNCTION: ICD-10-CM

## 2018-12-07 DIAGNOSIS — I43 CARDIOMYOPATHY IN DISEASES CLASSIFIED ELSEWHERE: ICD-10-CM

## 2018-12-07 PROCEDURE — 99214 OFFICE O/P EST MOD 30 MIN: CPT | Mod: S$GLB,,, | Performed by: INTERNAL MEDICINE

## 2018-12-07 RX ORDER — CARVEDILOL 6.25 MG/1
TABLET ORAL
COMMUNITY
Start: 2018-10-18 | End: 2020-03-06 | Stop reason: SDUPTHER

## 2018-12-07 NOTE — PROGRESS NOTES
Subjective:   Patient ID:  Adilia Rosas is a 83 y.o. female who presents for follow-up of Follow-up      Problem List Items Addressed This Visit        Cardiac/Vascular    Essential hypertension    H/O myocardial ischemia - Primary    Diastolic dysfunction    Heart failure       Renal/    Chronic kidney disease (CKD) stage G3b/A1, moderately decreased glomerular filtration rate (GFR) between 30-44 mL/min/1.73 square meter and albuminuria creatinine ratio less than 30 mg/g       Orthopedic    Femoral neck fracture          HPI: 84 y/o AA female with the above medical problems is here for f/u. She is doing well since hospitalization with a fall. She fractured right hip requiring surgery. She was followed by Cardiology during hospitalization with stable CHF. She s doing well now with no acute complaints. No chest pain or dyspnea. No orthopnea or PND. She is tolerating medications well. She is doing rehab for right hip. BP is uncontrolled she have not taken medications as yet.     Review of Systems   Constitution: Negative.   HENT: Negative.    Eyes: Negative.    Cardiovascular: Negative.    Respiratory: Negative.    Endocrine: Negative.    Hematologic/Lymphatic: Negative.    Skin: Negative.    Musculoskeletal: Negative.    Gastrointestinal: Negative.    Neurological: Negative.    Psychiatric/Behavioral: Negative.    Allergic/Immunologic: Negative.           Medication List           Accurate as of 12/7/18  9:00 AM. If you have any questions, ask your nurse or doctor.               CONTINUE taking these medications    * albuterol 90 mcg/actuation inhaler  Commonly known as:  PROVENTIL/VENTOLIN HFA  Inhale 2 puffs into the lungs every 6 (six) hours as needed for Wheezing.     * albuterol sulfate 2.5 mg/0.5 mL Nebu  Take 5 mg by nebulization every 6 (six) hours while awake.     atorvastatin 80 MG tablet  Commonly known as:  LIPITOR     carvedilol 6.25 MG tablet  Commonly known as:  COREG     ELIQUIS 5 mg Tab  Generic  drug:  apixaban     ferrous gluconate 324 mg (37.5 mg iron) Tab  Take 1 tablet (324 mg total) by mouth daily with breakfast.     HYDROcodone-acetaminophen 5-325 mg per tablet  Commonly known as:  NORCO  Take 2 tablets by mouth every 4 (four) hours as needed.         * This list has 2 medication(s) that are the same as other medications prescribed for you. Read the directions carefully, and ask your doctor or other care provider to review them with you.                Objective:   Physical Exam   Constitutional: She is oriented to person, place, and time. She appears well-developed and well-nourished. No distress.   Examination of the digits showed no clubbing or cyanosis   HENT:   Head: Normocephalic and atraumatic.   Eyes: Conjunctivae are normal. Pupils are equal, round, and reactive to light. Right eye exhibits no discharge.   Neck: Normal range of motion. Neck supple. No JVD present. No thyromegaly present.   No carotid bruits   Cardiovascular: Normal rate, regular rhythm, S1 normal, S2 normal, normal heart sounds, intact distal pulses and normal pulses. PMI is not displaced. Exam reveals no gallop, no friction rub and no opening snap.   No murmur heard.  Pulmonary/Chest: Effort normal and breath sounds normal. No respiratory distress. She has no wheezes. She has no rales. She exhibits no tenderness.   Abdominal: Soft. Bowel sounds are normal. She exhibits no distension and no mass. There is no tenderness. There is no guarding.   No hepatosplenomegaly   Musculoskeletal: Normal range of motion. She exhibits no edema or tenderness.   Lymphadenopathy:     She has no cervical adenopathy.   Neurological: She is alert and oriented to person, place, and time.   Skin: Skin is warm. No rash noted. She is not diaphoretic. No erythema.   Psychiatric: She has a normal mood and affect.   Nursing note and vitals reviewed.      ECGs reviewed-NSR with inferior infarct  LABS reviewed  Imaging including Echoes  reviewed    Assessment:     1. H/O myocardial ischemia    2. Essential hypertension    3. Chronic combined systolic and diastolic heart failure    4. Diastolic dysfunction    5. Chronic kidney disease (CKD) stage G3b/A1, moderately decreased glomerular filtration rate (GFR) between 30-44 mL/min/1.73 square meter and albuminuria creatinine ratio less than 30 mg/g    6. Closed fracture of neck of right femur, sequela        Plan:     Patient is doing well  Continue current medications.   Low salt diet  She did not have Nuclear stress done because she was hospitalized with fall  F/u in 2  Months. Will challenge with acei

## 2019-01-11 ENCOUNTER — TELEPHONE (OUTPATIENT)
Dept: CARDIOLOGY | Facility: CLINIC | Age: 84
End: 2019-01-11

## 2019-01-11 NOTE — TELEPHONE ENCOUNTER
Granddaughter notified that patient will need to be fasting for 4 hours before test and no caffine 24 hours before test.  She expressed understanding.

## 2019-01-11 NOTE — TELEPHONE ENCOUNTER
----- Message from Malinda Daniels sent at 1/11/2019  3:51 PM CST -----  Contact: [phone]/severiano  [facility] regarding instructions for her grandmother's stress test on Monday. Please advise.

## 2019-01-14 ENCOUNTER — HOSPITAL ENCOUNTER (OUTPATIENT)
Dept: CARDIOLOGY | Facility: HOSPITAL | Age: 84
Discharge: HOME | End: 2019-01-14
Attending: INTERNAL MEDICINE
Payer: MEDICARE

## 2019-01-14 ENCOUNTER — HOSPITAL ENCOUNTER (OUTPATIENT)
Dept: RADIOLOGY | Facility: HOSPITAL | Age: 84
Discharge: HOME | End: 2019-01-14
Attending: INTERNAL MEDICINE
Payer: MEDICARE

## 2019-01-14 DIAGNOSIS — I50.42 CHRONIC COMBINED SYSTOLIC AND DIASTOLIC HEART FAILURE: ICD-10-CM

## 2019-01-14 DIAGNOSIS — I43 CARDIOMYOPATHY IN DISEASES CLASSIFIED ELSEWHERE: ICD-10-CM

## 2019-01-14 DIAGNOSIS — Z86.79 H/O MYOCARDIAL ISCHEMIA: ICD-10-CM

## 2019-01-14 LAB
CV STRESS BASE HR: 64
DIASTOLIC BLOOD PRESSURE: 116
OHS CV CPX 1 MINUTE RECOVERY HEART RATE: 82 BPM
OHS CV CPX 85 PERCENT MAX PREDICTED HEART RATE MALE: 115
OHS CV CPX MAX PREDICTED HEART RATE: 135
OHS CV CPX PATIENT IS FEMALE: 1
OHS CV CPX PATIENT IS MALE: 0
OHS CV CPX PEAK DIASTOLIC BLOOD PRESSURE: 116 MMHG
OHS CV CPX PEAK HEAR RATE: 88
OHS CV CPX PEAK RATE PRESSURE PRODUCT: NORMAL
OHS CV CPX PEAK SYSTOLIC BLOOD PRESSURE: 220
OHS CV CPX PERCENT MAX PREDICTED HEART RATE ACHIEVED: 65
OHS CV CPX RATE PRESSURE PRODUCT PRESENTING: NORMAL
SYSTOLIC BLOOD PRESSURE: 199

## 2019-01-14 PROCEDURE — 63600175 PHARM REV CODE 636 W HCPCS: Mod: PO,ER | Performed by: INTERNAL MEDICINE

## 2019-01-14 PROCEDURE — 93016 CV STRESS TEST SUPVJ ONLY: CPT | Mod: ,,, | Performed by: INTERNAL MEDICINE

## 2019-01-14 PROCEDURE — A9502 TC99M TETROFOSMIN: HCPCS | Mod: PO,ER

## 2019-01-14 PROCEDURE — 93018 STRESS TEST ONLY (CUPID ONLY): ICD-10-PCS | Mod: ,,, | Performed by: INTERNAL MEDICINE

## 2019-01-14 PROCEDURE — 93018 CV STRESS TEST I&R ONLY: CPT | Mod: ,,, | Performed by: INTERNAL MEDICINE

## 2019-01-14 PROCEDURE — 93016 STRESS TEST ONLY (CUPID ONLY): ICD-10-PCS | Mod: ,,, | Performed by: INTERNAL MEDICINE

## 2019-01-14 PROCEDURE — 93017 CV STRESS TEST TRACING ONLY: CPT | Mod: PO,ER

## 2019-01-14 RX ORDER — REGADENOSON 0.08 MG/ML
0.4 INJECTION, SOLUTION INTRAVENOUS ONCE
Status: COMPLETED | OUTPATIENT
Start: 2019-01-14 | End: 2019-01-14

## 2019-01-14 RX ORDER — AMLODIPINE BESYLATE 5 MG/1
5 TABLET ORAL DAILY
Qty: 30 TABLET | Refills: 11 | Status: SHIPPED | OUTPATIENT
Start: 2019-01-14 | End: 2019-12-23 | Stop reason: SDUPTHER

## 2019-01-14 RX ORDER — REGADENOSON 0.08 MG/ML
INJECTION, SOLUTION INTRAVENOUS
Status: DISPENSED
Start: 2019-01-14 | End: 2019-01-14

## 2019-01-14 RX ADMIN — REGADENOSON 0.4 MG: 0.08 INJECTION, SOLUTION INTRAVENOUS at 01:01

## 2019-01-15 ENCOUNTER — TELEPHONE (OUTPATIENT)
Dept: CARDIOLOGY | Facility: CLINIC | Age: 84
End: 2019-01-15

## 2019-01-15 RX ORDER — CLOPIDOGREL BISULFATE 75 MG/1
75 TABLET ORAL DAILY
Qty: 30 TABLET | Refills: 5 | Status: SHIPPED | OUTPATIENT
Start: 2019-01-15 | End: 2019-07-12 | Stop reason: SDUPTHER

## 2019-01-15 NOTE — TELEPHONE ENCOUNTER
----- Message from Samra Luis sent at 1/15/2019 11:19 AM CST -----  Contact: [phone]/Faith  Patient's DeShondra/granddaughter is requesting a call back. She wants to clarify what was told to the patient. Thanks

## 2019-01-15 NOTE — TELEPHONE ENCOUNTER
Called and inform patient of Stress results that showed some infarct with per-infarct ischemia in inferior wall. Will treat medically considering patient's kidney function and her currently being asymptomatic and euvolemic.

## 2019-01-15 NOTE — TELEPHONE ENCOUNTER
----- Message from Samra Luis sent at 1/15/2019 11:19 AM CST -----  Contact: [phone]/Faith  Patient's DeShondra/granddaughter is requesting a call back. She wants to clarify what was told to the patient. Thanks

## 2019-02-15 ENCOUNTER — OFFICE VISIT (OUTPATIENT)
Dept: CARDIOLOGY | Facility: CLINIC | Age: 84
End: 2019-02-15
Payer: MEDICARE

## 2019-02-15 VITALS
SYSTOLIC BLOOD PRESSURE: 151 MMHG | HEIGHT: 64 IN | OXYGEN SATURATION: 92 % | DIASTOLIC BLOOD PRESSURE: 72 MMHG | WEIGHT: 148.19 LBS | HEART RATE: 62 BPM | BODY MASS INDEX: 25.3 KG/M2

## 2019-02-15 DIAGNOSIS — R93.1 DECREASED CARDIAC EJECTION FRACTION: ICD-10-CM

## 2019-02-15 DIAGNOSIS — Z86.79 H/O MYOCARDIAL ISCHEMIA: ICD-10-CM

## 2019-02-15 DIAGNOSIS — I50.43 ACUTE ON CHRONIC COMBINED SYSTOLIC AND DIASTOLIC CONGESTIVE HEART FAILURE, NYHA CLASS 4: ICD-10-CM

## 2019-02-15 DIAGNOSIS — I51.89 DIASTOLIC DYSFUNCTION: ICD-10-CM

## 2019-02-15 DIAGNOSIS — I27.20 PULMONARY HYPERTENSION: ICD-10-CM

## 2019-02-15 DIAGNOSIS — I35.1 MILD AORTIC REGURGITATION: ICD-10-CM

## 2019-02-15 DIAGNOSIS — I08.0 MILD MITRAL AND AORTIC REGURGITATION: ICD-10-CM

## 2019-02-15 DIAGNOSIS — I10 ESSENTIAL HYPERTENSION: Primary | ICD-10-CM

## 2019-02-15 PROCEDURE — 99214 OFFICE O/P EST MOD 30 MIN: CPT | Mod: S$GLB,,, | Performed by: INTERNAL MEDICINE

## 2019-02-15 PROCEDURE — 99214 PR OFFICE/OUTPT VISIT, EST, LEVL IV, 30-39 MIN: ICD-10-PCS | Mod: S$GLB,,, | Performed by: INTERNAL MEDICINE

## 2019-02-15 RX ORDER — FUROSEMIDE 20 MG/1
TABLET ORAL
COMMUNITY
Start: 2019-02-12 | End: 2019-12-03 | Stop reason: ALTCHOICE

## 2019-02-15 RX ORDER — POTASSIUM CHLORIDE 750 MG/1
CAPSULE, EXTENDED RELEASE ORAL
COMMUNITY
Start: 2019-02-12

## 2019-02-15 NOTE — PROGRESS NOTES
Subjective:   Patient ID:  Adilia Rosas is a 83 y.o. female who presents for follow-up of No chief complaint on file.      Problem List Items Addressed This Visit        Pulmonary    Pulmonary hypertension       Cardiac/Vascular    Essential hypertension - Primary    H/O myocardial ischemia    Acute on chronic combined systolic and diastolic congestive heart failure, NYHA class 4    Diastolic dysfunction    Decreased cardiac ejection fraction    Mild aortic regurgitation    Mild mitral and aortic regurgitation          HPI: 84 y/o AA female with the above medical problems is here for f/u. She is doing well since previous appointment. Nuclear stress showed apical infarct with some ischemia which was small so decided to treat medically. BP is controlled at home. She denies chest pain or dyspnea or orthopnea/PND.   She is compliant with medications.     Review of Systems   Constitution: Negative.   HENT: Negative.    Eyes: Negative.    Cardiovascular: Negative.    Respiratory: Negative.    Endocrine: Negative.    Hematologic/Lymphatic: Negative.    Skin: Negative.    Musculoskeletal: Negative.    Gastrointestinal: Negative.    Neurological: Negative.    Psychiatric/Behavioral: Negative.    Allergic/Immunologic: Negative.        Patient's Medications   New Prescriptions    No medications on file   Previous Medications    ALBUTEROL (PROVENTIL/VENTOLIN HFA) 90 MCG/ACTUATION INHALER    Inhale 2 puffs into the lungs every 6 (six) hours as needed for Wheezing.    ALBUTEROL SULFATE 2.5 MG/0.5 ML NEBU    Take 5 mg by nebulization every 6 (six) hours while awake.    AMLODIPINE (NORVASC) 5 MG TABLET    Take 1 tablet (5 mg total) by mouth once daily.    APIXABAN (ELIQUIS) 5 MG TAB    Take 5 mg by mouth once daily.    ATORVASTATIN (LIPITOR) 80 MG TABLET    Take 80 mg by mouth once daily.    CARVEDILOL (COREG) 6.25 MG TABLET        CLOPIDOGREL (PLAVIX) 75 MG TABLET    Take 1 tablet (75 mg total) by mouth once daily.    FERROUS  GLUCONATE 324 MG (37.5 MG IRON) TAB    Take 1 tablet (324 mg total) by mouth daily with breakfast.    FUROSEMIDE (LASIX) 20 MG TABLET        HYDROCODONE-ACETAMINOPHEN 5-325MG (NORCO) 5-325 MG PER TABLET    Take 2 tablets by mouth every 4 (four) hours as needed.    POTASSIUM CHLORIDE (MICRO-K) 10 MEQ CPSR       Modified Medications    No medications on file   Discontinued Medications    No medications on file       Objective:   Physical Exam   Constitutional: She is oriented to person, place, and time. She appears well-developed and well-nourished. No distress.   Examination of the digits showed no clubbing or cyanosis   HENT:   Head: Normocephalic and atraumatic.   Eyes: Conjunctivae are normal. Pupils are equal, round, and reactive to light. Right eye exhibits no discharge.   Neck: Normal range of motion. Neck supple. No JVD present. No thyromegaly present.   No carotid bruits   Cardiovascular: Normal rate, regular rhythm, S1 normal, S2 normal, normal heart sounds, intact distal pulses and normal pulses. PMI is not displaced. Exam reveals no gallop, no friction rub and no opening snap.   No murmur heard.  Pulmonary/Chest: Effort normal and breath sounds normal. No respiratory distress. She has no wheezes. She has no rales. She exhibits no tenderness.   Abdominal: Soft. Bowel sounds are normal. She exhibits no distension and no mass. There is no tenderness. There is no guarding.   No hepatosplenomegaly   Musculoskeletal: Normal range of motion. She exhibits no edema or tenderness.   Lymphadenopathy:     She has no cervical adenopathy.   Neurological: She is alert and oriented to person, place, and time.   Skin: Skin is warm. No rash noted. She is not diaphoretic. No erythema.   Psychiatric: She has a normal mood and affect.   Nursing note and vitals reviewed.      ECGs reviewed-NSR with inferior infarct  LABS reviewed  Imaging including Echoes reviewed- ef 35% with DD and PH with mild MR and AI    Assessment:     1.  Essential hypertension    2. H/O myocardial ischemia    3. Diastolic dysfunction    4. Acute on chronic combined systolic and diastolic congestive heart failure, NYHA class 4    5. Decreased cardiac ejection fraction    6. Mild aortic regurgitation    7. Mild mitral and aortic regurgitation    8. Pulmonary hypertension        Plan:     Patient is doing well  Continue current medications.   Low salt diet  Activity as tolerate  F/u in 4 months

## 2019-02-22 ENCOUNTER — OUTSIDE PLACE OF SERVICE (OUTPATIENT)
Dept: CARDIOLOGY | Facility: CLINIC | Age: 84
End: 2019-02-22

## 2019-06-28 ENCOUNTER — OFFICE VISIT (OUTPATIENT)
Dept: CARDIOLOGY | Facility: CLINIC | Age: 84
End: 2019-06-28
Payer: MEDICARE

## 2019-06-28 VITALS
DIASTOLIC BLOOD PRESSURE: 60 MMHG | WEIGHT: 150 LBS | SYSTOLIC BLOOD PRESSURE: 135 MMHG | HEIGHT: 64 IN | HEART RATE: 66 BPM | BODY MASS INDEX: 25.61 KG/M2

## 2019-06-28 DIAGNOSIS — I50.43 ACUTE ON CHRONIC COMBINED SYSTOLIC AND DIASTOLIC CONGESTIVE HEART FAILURE, NYHA CLASS 4: ICD-10-CM

## 2019-06-28 DIAGNOSIS — Z86.79 H/O MYOCARDIAL ISCHEMIA: ICD-10-CM

## 2019-06-28 DIAGNOSIS — I51.89 DIASTOLIC DYSFUNCTION: ICD-10-CM

## 2019-06-28 DIAGNOSIS — I10 ESSENTIAL HYPERTENSION: Primary | ICD-10-CM

## 2019-06-28 DIAGNOSIS — I08.0 MILD MITRAL AND AORTIC REGURGITATION: ICD-10-CM

## 2019-06-28 DIAGNOSIS — I35.1 MILD AORTIC REGURGITATION: ICD-10-CM

## 2019-06-28 PROCEDURE — 99214 PR OFFICE/OUTPT VISIT, EST, LEVL IV, 30-39 MIN: ICD-10-PCS | Mod: S$GLB,,, | Performed by: INTERNAL MEDICINE

## 2019-06-28 PROCEDURE — 99214 OFFICE O/P EST MOD 30 MIN: CPT | Mod: S$GLB,,, | Performed by: INTERNAL MEDICINE

## 2019-06-28 RX ORDER — LISINOPRIL 2.5 MG/1
2.5 TABLET ORAL DAILY
Qty: 90 TABLET | Refills: 3 | Status: SHIPPED | OUTPATIENT
Start: 2019-06-28 | End: 2019-12-13 | Stop reason: ALTCHOICE

## 2019-06-28 RX ORDER — LISINOPRIL 2.5 MG/1
2.5 TABLET ORAL DAILY
Qty: 90 TABLET | Refills: 3 | Status: SHIPPED | OUTPATIENT
Start: 2019-06-28 | End: 2019-06-28 | Stop reason: SDUPTHER

## 2019-06-28 NOTE — PROGRESS NOTES
Subjective:   Patient ID:  Adilia Rosas is a 84 y.o. female who presents for follow-up of No chief complaint on file.      Problem List Items Addressed This Visit        Cardiac/Vascular    Essential hypertension - Primary    Relevant Orders    Basic metabolic panel    H/O myocardial ischemia    Relevant Orders    Basic metabolic panel    Acute on chronic combined systolic and diastolic congestive heart failure, NYHA class 4    Relevant Medications    lisinopril (PRINIVIL,ZESTRIL) 2.5 MG tablet    Other Relevant Orders    Basic metabolic panel    Diastolic dysfunction    Relevant Medications    lisinopril (PRINIVIL,ZESTRIL) 2.5 MG tablet    Mild aortic regurgitation    Relevant Medications    lisinopril (PRINIVIL,ZESTRIL) 2.5 MG tablet    Mild mitral and aortic regurgitation          HPI: 84 y/o AA female with the above medical problems is here for f/u. She is doing well since previous appointment with no changes.   Nuclear stress showed apical infarct with some ischemia which was small so decided to treat medically. BP is controlled at home.   She denies chest pain or dyspnea or orthopnea/PND.   She is compliant with medications. Weight is stable.    She is on Eliquis by IM for DVTs    Review of Systems   Constitution: Negative.   HENT: Negative.    Eyes: Negative.    Cardiovascular: Negative.    Respiratory: Negative.    Endocrine: Negative.    Hematologic/Lymphatic: Negative.    Skin: Negative.    Musculoskeletal: Negative.    Gastrointestinal: Negative.    Neurological: Negative.    Psychiatric/Behavioral: Negative.    Allergic/Immunologic: Negative.        Patient's Medications   New Prescriptions    LISINOPRIL (PRINIVIL,ZESTRIL) 2.5 MG TABLET    Take 1 tablet (2.5 mg total) by mouth once daily.   Previous Medications    ALBUTEROL (PROVENTIL/VENTOLIN HFA) 90 MCG/ACTUATION INHALER    Inhale 2 puffs into the lungs every 6 (six) hours as needed for Wheezing.    ALBUTEROL SULFATE 2.5 MG/0.5 ML NEBU    Take 5 mg by  nebulization every 6 (six) hours while awake.    AMLODIPINE (NORVASC) 5 MG TABLET    Take 1 tablet (5 mg total) by mouth once daily.    APIXABAN (ELIQUIS) 5 MG TAB    Take 5 mg by mouth once daily.    ATORVASTATIN (LIPITOR) 80 MG TABLET    Take 80 mg by mouth once daily.    CARVEDILOL (COREG) 6.25 MG TABLET        CLOPIDOGREL (PLAVIX) 75 MG TABLET    Take 1 tablet (75 mg total) by mouth once daily.    FERROUS GLUCONATE 324 MG (37.5 MG IRON) TAB    Take 1 tablet (324 mg total) by mouth daily with breakfast.    FUROSEMIDE (LASIX) 20 MG TABLET        HYDROCODONE-ACETAMINOPHEN 5-325MG (NORCO) 5-325 MG PER TABLET    Take 2 tablets by mouth every 4 (four) hours as needed.    POTASSIUM CHLORIDE (MICRO-K) 10 MEQ CPSR       Modified Medications    No medications on file   Discontinued Medications    No medications on file       Objective:   Physical Exam   Constitutional: She is oriented to person, place, and time. She appears well-developed and well-nourished. No distress.   Examination of the digits showed no clubbing or cyanosis   HENT:   Head: Normocephalic and atraumatic.   Eyes: Pupils are equal, round, and reactive to light. Conjunctivae are normal. Right eye exhibits no discharge.   Neck: Normal range of motion. Neck supple. No JVD present. No thyromegaly present.   No carotid bruits   Cardiovascular: Normal rate, regular rhythm, S1 normal, S2 normal, normal heart sounds, intact distal pulses and normal pulses. PMI is not displaced. Exam reveals no gallop, no friction rub and no opening snap.   No murmur heard.  Pulmonary/Chest: Effort normal and breath sounds normal. No respiratory distress. She has no wheezes. She has no rales. She exhibits no tenderness.   Abdominal: Soft. Bowel sounds are normal. She exhibits no distension and no mass. There is no tenderness. There is no guarding.   No hepatosplenomegaly   Musculoskeletal: Normal range of motion. She exhibits no edema or tenderness.   Lymphadenopathy:     She has  no cervical adenopathy.   Neurological: She is alert and oriented to person, place, and time.   Skin: Skin is warm. No rash noted. She is not diaphoretic. No erythema.   Psychiatric: She has a normal mood and affect.   Nursing note and vitals reviewed.      ECGs reviewed-NSR with inferior infarct  LABS reviewed  Imaging including Echoes reviewed- ef 35% with DD and PH with mild MR and AI    Assessment:     1. Essential hypertension    2. H/O myocardial ischemia    3. Acute on chronic combined systolic and diastolic congestive heart failure, NYHA class 4    4. Mild aortic regurgitation    5. Mild mitral and aortic regurgitation    6. Diastolic dysfunction        Plan:     Patient is doing well  Continue current medications. Add lisinopril 2.5 mg po daily for CM  BMP  Low salt diet  Activity as tolerate  F/u in 6 months      Orders Placed This Encounter   Procedures    Basic metabolic panel     Standing Status:   Future     Standing Expiration Date:   8/26/2020

## 2019-07-12 NOTE — TELEPHONE ENCOUNTER
----- Message from Manda Griffin sent at 7/12/2019 10:24 AM CDT -----  Contact: daughter  Refill request for:  clopidogrel (PLAVIX) 75 mg tablet  Be sent to:  [facility] - [place], [place] - [address]

## 2019-07-13 RX ORDER — CLOPIDOGREL BISULFATE 75 MG/1
75 TABLET ORAL DAILY
Qty: 90 TABLET | Refills: 3 | Status: SHIPPED | OUTPATIENT
Start: 2019-07-13 | End: 2019-07-17 | Stop reason: SDUPTHER

## 2019-07-17 RX ORDER — CLOPIDOGREL BISULFATE 75 MG/1
75 TABLET ORAL DAILY
Qty: 90 TABLET | Refills: 3 | Status: SHIPPED | OUTPATIENT
Start: 2019-07-17 | End: 2020-03-06

## 2019-07-17 RX ORDER — CLOPIDOGREL BISULFATE 75 MG/1
75 TABLET ORAL DAILY
Qty: 90 TABLET | Refills: 3 | Status: SHIPPED | OUTPATIENT
Start: 2019-07-17 | End: 2019-07-17 | Stop reason: SDUPTHER

## 2019-12-03 ENCOUNTER — OFFICE VISIT (OUTPATIENT)
Dept: CARDIOLOGY | Facility: CLINIC | Age: 84
End: 2019-12-03
Payer: MEDICARE

## 2019-12-03 VITALS
HEART RATE: 59 BPM | SYSTOLIC BLOOD PRESSURE: 134 MMHG | BODY MASS INDEX: 27.66 KG/M2 | HEIGHT: 64 IN | WEIGHT: 162 LBS | DIASTOLIC BLOOD PRESSURE: 79 MMHG

## 2019-12-03 DIAGNOSIS — I50.43 ACUTE ON CHRONIC COMBINED SYSTOLIC AND DIASTOLIC CONGESTIVE HEART FAILURE, NYHA CLASS 4: ICD-10-CM

## 2019-12-03 DIAGNOSIS — N18.32 CHRONIC KIDNEY DISEASE (CKD) STAGE G3B/A1, MODERATELY DECREASED GLOMERULAR FILTRATION RATE (GFR) BETWEEN 30-44 ML/MIN/1.73 SQUARE METER AND ALBUMINURIA CREATININE RATIO LESS THAN 30 MG/G: ICD-10-CM

## 2019-12-03 DIAGNOSIS — Z86.79 H/O MYOCARDIAL ISCHEMIA: ICD-10-CM

## 2019-12-03 DIAGNOSIS — I10 ESSENTIAL HYPERTENSION: Primary | ICD-10-CM

## 2019-12-03 DIAGNOSIS — R94.39 ABNORMAL STRESS TEST: ICD-10-CM

## 2019-12-03 PROCEDURE — 99213 OFFICE O/P EST LOW 20 MIN: CPT | Mod: PBBFAC,PO | Performed by: STUDENT IN AN ORGANIZED HEALTH CARE EDUCATION/TRAINING PROGRAM

## 2019-12-03 PROCEDURE — 1126F AMNT PAIN NOTED NONE PRSNT: CPT | Mod: ,,, | Performed by: STUDENT IN AN ORGANIZED HEALTH CARE EDUCATION/TRAINING PROGRAM

## 2019-12-03 PROCEDURE — 1159F MED LIST DOCD IN RCRD: CPT | Mod: ,,, | Performed by: STUDENT IN AN ORGANIZED HEALTH CARE EDUCATION/TRAINING PROGRAM

## 2019-12-03 PROCEDURE — 1159F PR MEDICATION LIST DOCUMENTED IN MEDICAL RECORD: ICD-10-PCS | Mod: ,,, | Performed by: STUDENT IN AN ORGANIZED HEALTH CARE EDUCATION/TRAINING PROGRAM

## 2019-12-03 PROCEDURE — 1126F PR PAIN SEVERITY QUANTIFIED, NO PAIN PRESENT: ICD-10-PCS | Mod: ,,, | Performed by: STUDENT IN AN ORGANIZED HEALTH CARE EDUCATION/TRAINING PROGRAM

## 2019-12-03 PROCEDURE — 99999 PR PBB SHADOW E&M-EST. PATIENT-LVL III: CPT | Mod: PBBFAC,,, | Performed by: STUDENT IN AN ORGANIZED HEALTH CARE EDUCATION/TRAINING PROGRAM

## 2019-12-03 PROCEDURE — 99214 PR OFFICE/OUTPT VISIT, EST, LEVL IV, 30-39 MIN: ICD-10-PCS | Mod: S$PBB,,, | Performed by: STUDENT IN AN ORGANIZED HEALTH CARE EDUCATION/TRAINING PROGRAM

## 2019-12-03 PROCEDURE — 99999 PR PBB SHADOW E&M-EST. PATIENT-LVL III: ICD-10-PCS | Mod: PBBFAC,,, | Performed by: STUDENT IN AN ORGANIZED HEALTH CARE EDUCATION/TRAINING PROGRAM

## 2019-12-03 PROCEDURE — 99214 OFFICE O/P EST MOD 30 MIN: CPT | Mod: S$PBB,,, | Performed by: STUDENT IN AN ORGANIZED HEALTH CARE EDUCATION/TRAINING PROGRAM

## 2019-12-03 RX ORDER — ACETAMINOPHEN 500 MG
500 TABLET ORAL EVERY 6 HOURS PRN
COMMUNITY

## 2019-12-03 RX ORDER — FUROSEMIDE 40 MG/1
TABLET ORAL
Qty: 58 TABLET | Refills: 1 | Status: SHIPPED | OUTPATIENT
Start: 2019-12-03 | End: 2019-12-13 | Stop reason: SDUPTHER

## 2019-12-03 RX ORDER — BENAZEPRIL HYDROCHLORIDE 20 MG/1
20 TABLET ORAL DAILY
COMMUNITY
End: 2019-12-03 | Stop reason: ALTCHOICE

## 2019-12-03 RX ORDER — ASPIRIN 81 MG
100 TABLET, DELAYED RELEASE (ENTERIC COATED) ORAL 2 TIMES DAILY
COMMUNITY

## 2019-12-03 NOTE — LETTER
December 3, 2019      Yessica Huff, FNP-C  [address] [facility]  [place] [address]           [facility] - Cardiology  [address] [place] [address]. [address]  [facility] [place] [address]  Phone: [phone]  Fax: [phone]          Patient: Adilia Rosas   MR Number: [id number]   YOB: 1935   Date of Visit: 12/3/2019       Dear Yessica Huff:    Thank you for referring Adilia Rosas to me for evaluation. Attached you will find relevant portions of my assessment and plan of care.    If you have questions, please do not hesitate to call me. I look forward to following Adilia Rosas along with you.    Sincerely,    Segundo Roberts MD    Enclosure  CC:  No Recipients    If you would like to receive this communication electronically, please contact [email or web address] or [phone] to request more information on [facility] access.    For providers and/or their staff who would like to refer a patient to [facility], please contact us through our one-stop-shop provider referral line, [facility], at [phone].    If you feel you have received this communication in error or would no longer like to receive these types of communications, please e-mail [email or web address]

## 2019-12-03 NOTE — PROGRESS NOTES
"   Cardiology Clinic note    Subjective:   Patient ID:  Adilia Rosas is a 84 y.o. female who presents for follow-up of HTN,     HPI:   Adilia Rosas  has a past medical history of Arthritis, Elevated cholesterol, GERD (gastroesophageal reflux disease), History of DVT (deep vein thrombosis) (2016), and Hypertension.    85 y/o AA female with the above medical problems is here for f/u. She is doing well since previous appointment with no changes.   Nuclear stress showed apical infarct with some ischemia which was small so decided to treat medically. BP is controlled at home.   She denies chest pain or dyspnea or orthopnea/PND.   She is compliant with medications. Weight has increase 10 pounds and she has 3+ pitting edema with venous stasis ulcer on her left leg.     She is on Eliquis by IM for DVTs. plavix for abn stress in Jan 2019. No symptomatic from her heart failure.     Vitals  Vitals:    12/03/19 1455   BP: 134/79   Pulse: (!) 59   Weight: 73.5 kg (162 lb)   Height: 5' 4" (1.626 m)       Patient Active Problem List    Diagnosis Date Noted    Abnormal stress test 12/03/2019    Mild aortic regurgitation 02/15/2019    Mild mitral and aortic regurgitation 02/15/2019    Heart failure     Femoral neck fracture     Hyperkalemia     Pulmonary hypertension     Diastolic dysfunction     Decreased cardiac ejection fraction     Fall on same level from slipping, tripping and stumbling without subsequent striking against object, initial encounter 11/12/2018    Acute on chronic combined systolic and diastolic congestive heart failure, NYHA class 4 10/16/2018    Hypertensive urgency 10/16/2018    Cardiomyopathy 10/16/2018    Chronic kidney disease (CKD) stage G3b/A1, moderately decreased glomerular filtration rate (GFR) between 30-44 mL/min/1.73 square meter and albuminuria creatinine ratio less than 30 mg/g 02/07/2017    Fracture of femur 01/13/2017    Gastroesophageal reflux disease 01/10/2017    Arthritis " 01/10/2017    H/O myocardial ischemia     Post op infection 2017    Hypomagnesemia 2016    Hypophosphatemia 2016    Anemia 2016    Essential hypertension 2016    Staphylococcus aureus infection     Postoperative infection 2016    Closed fracture of distal end of left femur 2016       Patient's Medications   New Prescriptions    No medications on file   Previous Medications    ACETAMINOPHEN (TYLENOL) 500 MG TABLET    Take 500 mg by mouth every 6 (six) hours as needed for Pain.    ALBUTEROL SULFATE 2.5 MG/0.5 ML NEBU    Take 5 mg by nebulization every 6 (six) hours while awake.    AMLODIPINE (NORVASC) 5 MG TABLET    Take 1 tablet (5 mg total) by mouth once daily.    APIXABAN (ELIQUIS) 5 MG TAB    Take 5 mg by mouth once daily.    ATORVASTATIN (LIPITOR) 80 MG TABLET    Take 80 mg by mouth once daily.    BENAZEPRIL (LOTENSIN) 20 MG TABLET    Take 20 mg by mouth once daily.    CARVEDILOL (COREG) 6.25 MG TABLET        CLOPIDOGREL (PLAVIX) 75 MG TABLET    Take 1 tablet (75 mg total) by mouth once daily.    DOCUSATE SODIUM (STOOL SOFTENER) 100 MG CAPSULE    Take 100 mg by mouth 2 (two) times daily.    FERROUS GLUCONATE 324 MG (37.5 MG IRON) TAB    Take 1 tablet (324 mg total) by mouth daily with breakfast.    FUROSEMIDE (LASIX) 20 MG TABLET        HYDROCODONE-ACETAMINOPHEN 5-325MG (NORCO) 5-325 MG PER TABLET    Take 2 tablets by mouth every 4 (four) hours as needed.    LISINOPRIL (PRINIVIL,ZESTRIL) 2.5 MG TABLET    Take 1 tablet (2.5 mg total) by mouth once daily.    POTASSIUM CHLORIDE (MICRO-K) 10 MEQ CPSR       Modified Medications    No medications on file   Discontinued Medications    No medications on file      Right Arm BP - Sittin/81  Left Arm BP - Sittin/79  Review of Systems   Constitution: Negative for chills, decreased appetite, malaise/fatigue and weight gain.   HENT: Negative for congestion and ear discharge.    Eyes: Negative for blurred vision and  double vision.   Cardiovascular: Positive for leg swelling. Negative for chest pain, cyanosis, dyspnea on exertion, irregular heartbeat, near-syncope, orthopnea, palpitations and syncope.   Respiratory: Negative for cough, shortness of breath and sleep disturbances due to breathing.    Skin: Positive for poor wound healing. Negative for color change and dry skin.   Musculoskeletal: Negative for joint pain, joint swelling and muscle cramps.   Gastrointestinal: Negative for bloating, heartburn, hematemesis and hematochezia.   Genitourinary: Negative for bladder incontinence and dysuria.   Neurological: Negative for aphonia, excessive daytime sleepiness, dizziness, focal weakness, headaches, light-headedness, loss of balance and weakness.   Psychiatric/Behavioral: Negative for altered mental status, depression and memory loss. The patient does not have insomnia and is not nervous/anxious.          Objective:   Physical Exam   Constitutional: She is oriented to person, place, and time. She appears well-developed and well-nourished.   HENT:   Head: Normocephalic and atraumatic.   Eyes: Conjunctivae and EOM are normal.   Neck: Normal range of motion. Neck supple. No JVD present.   Cardiovascular: Normal rate, regular rhythm and normal heart sounds.   No murmur heard.  Pulmonary/Chest: Effort normal and breath sounds normal. No respiratory distress. She has no wheezes. She has no rales.   Abdominal: Soft. Bowel sounds are normal. She exhibits no distension.   Musculoskeletal: Normal range of motion. She exhibits edema.   3+ pitting edema   Neurological: She is alert and oriented to person, place, and time.   Skin: Skin is warm and dry. No erythema.   Psychiatric: She has a normal mood and affect. Her behavior is normal. Judgment and thought content normal.   Nursing note and vitals reviewed.      Lab Results    Lab Results   Component Value Date     11/19/2018    K 5.3 (H) 11/19/2018     11/19/2018    CO2 26  11/19/2018    BUN 34 (H) 11/19/2018    CREATININE 2.1 (H) 11/19/2018    GLU 95 11/19/2018    HGBA1C 4.8 11/12/2018    MG 1.7 11/19/2018    AST 30 11/19/2018    ALT 7 (L) 11/19/2018    ALBUMIN 2.3 (L) 11/19/2018    PROT 5.6 (L) 11/19/2018    BILITOT 0.5 11/19/2018    WBC 5.79 11/19/2018    HGB 8.6 (L) 11/19/2018    HCT 28.2 (L) 11/19/2018    MCV 96 11/19/2018     11/19/2018    INR 1.1 11/13/2018    INR 2.0 (H) 03/07/2007    TSH 0.983 11/12/2018    CHOL 129 11/29/2006    HDL 38.0 (L) 11/29/2006    LDLCALC 72.4 11/29/2006    TRIG 93 11/29/2006    CRP 28.0 (H) 03/10/2017     (H) 11/15/2018       Lipid panel  Lab Results   Component Value Date    CHOL 129 11/29/2006     Lab Results   Component Value Date    HDL 38.0 (L) 11/29/2006     Lab Results   Component Value Date    LDLCALC 72.4 11/29/2006     Lab Results   Component Value Date    TRIG 93 11/29/2006       Cardiac Studies  Significant Imaging: Echocardiogram:   2D echo with color flow doppler:   Results for orders placed or performed during the hospital encounter of 09/19/16   2D echo with color flow doppler   Result Value Ref Range    QEF 55 55 - 65    Mitral Valve Regurgitation MILD     Diastolic Dysfunction No     Aortic Valve Regurgitation TRIVIAL     Est. PA Systolic Pressure 37.57     Tricuspid Valve Regurgitation TRIVIAL     Narrative    Date of Procedure: 09/19/2016        TEST DESCRIPTION   Technical Quality: This is a technically adequate study.     Aorta: The aortic root is normal in size, measuring 2.5 cm at sinotubular junction.     Left Atrium: The left atrial volume index is mildly enlarged, measuring 80.68 cc/m2.     Left Ventricle: The left ventricle is normal in size, with an end-diastolic diameter of 4.8 cm, and an end-systolic diameter of 3.6 cm. LV wall thickness is normal, with the septum and the posterior wall each measuring 0.9 cm across. Relative wall   thickness was normal at 0.38, and the LV mass index was 100.2 g/m2  consistent with normal left ventricular mass. Global left ventricular systolic function appears normal. Visually estimated ejection fraction is 55-60%. The LV Doppler derived stroke   volume equals 98.0 ccs.   The E/e'(lat) is 7, consistent with normal diastolic function.     Right Atrium: The right atrium is normal in size, measuring 6.2 cm in length in the apical view.     Right Ventricle: The right ventricle is normal in size measuring 2.5 cm at the base in the apical right ventricle-focused view. Global right ventricular systolic function appears normal. The estimated PA systolic pressure is 38 mmHg.     Aortic Valve:  The aortic valve is mildly sclerotic. Additionally, there is trivial aortic regurgitation.     Mitral Valve:  The mitral valve is normal in structure. There is mild mitral regurgitation.     Tricuspid Valve:  There is trivial tricuspid regurgitation.     IVC: IVC is normal in size and collapses > 50% with a sniff, suggesting normal right atrial pressure of 3 mmHg.     Intracavitary: There is no evidence of pericardial effusion, intracavity mass, thrombi, or vegetation.         CONCLUSIONS     1 - Mild left atrial enlargement.     2 - Normal left ventricular systolic function (EF 55-60%).     3 - Normal right ventricular systolic function .     4 - The estimated PA systolic pressure is 38 mmHg.     5 - Trivial aortic regurgitation.     6 - Mild mitral regurgitation.     7 - Trivial tricuspid regurgitation.     8 - Grade 1 diastolic dysfunction .             This document has been electronically    SIGNED BY: Luke Hackett MD On: 09/19/2016 13:28    and Transthoracic echo (TTE) complete (Cupid Only):   Results for orders placed or performed during the hospital encounter of 11/12/18   Transthoracic echo (TTE) complete (Cupid Only)   Result Value Ref Range    LA WIDTH 4.91 cm    PV PEAK VELOCITY 1.06 cm/s    LVIDD 5.49 3.5 - 6.0 cm    IVS 1.20 (A) 0.6 - 1.1 cm    PW 1.20 (A) 0.6 - 1.1 cm    Ao  root annulus 3.04 cm    LVIDS 3.55 2.1 - 4.0 cm    FS 35 28 - 44 %    LA volume 113.86 cm3    LV mass 271.58 g    LA size 4.35 cm    RVDD 2.52 cm    Left Ventricle Relative Wall Thickness 0.44 cm    AV mean gradient 7.07 mmHg    AV valve area 2.00 cm2    E/A ratio 0.57     E wave decelartion time 277.30 msec    Pulm vein S/D ratio 1.55     LVOT diameter 2.05 cm    LVOT area 3.30 cm2    LVOT peak VTI 25.07 cm    Ao peak joe 1.92 m/s    Ao VTI 41.40 cm    LVOT stroke volume 82.70 cm3    AV peak gradient 14.75 mmHg    MV Peak E Joe 0.64 m/s    TR Max Joe 3.32 m/s    MV Peak A Joe 1.12 m/s    PV Peak S Joe 0.45 m/s    PV Peak D Joe 0.29 m/s    LV Systolic Volume 52.70 mL    LV Diastolic Volume 147.04 mL    RA Major Axis 5.65 cm    Left Atrium Minor Axis 6.13 cm    Left Atrium Major Axis 6.42 cm    Triscuspid Valve Regurgitation Peak Gradient 44.09 mmHg    BSA 1.86 m2    LV Systolic Volume Index 28.3 mL/m2    LV Diastolic Volume Index 79.05 mL/m2    LA Volume Index 61.2 mL/m2    LV Mass Index 146.0 g/m2    Right Atrial Pressure (from IVC) 15 mmHg    TV rest pulmonary artery pressure 59.09 mmHg    Narrative    · The left ventricle cavity is moderately dilated.  · Left ventricle shows mild concentric hypertrophy.  · Left ventricle ejection fraction is moderately decreased at 35-40%  · Grade I (mild) left ventricular diastolic dysfunction consistent with   impaired relaxation.  · LA pressure is normal.  · Local segmental wall motion abnormalities. The mid inferior wall and mid   LVPW are akinetic  · Left atrium is moderately dilated.  · RV systolic function is normal.  · Right atrium is mildly dilated.  · Mild aortic regurgitation.  · Mild mitral regurgitation.  · Mild tricuspid regurgitation.  · Pulmonic valve shows mild regurgitation.  · Elevated central venous pressure (15 mm Hg).  · The estimated PA systolic pressure is 59.09 mm Hg  · Pulmonary hypertension present.  · No pericardial effusion.        ECG:  normal EKG,  normal sinus rhythm, unchanged from previous tracings, Q waves in inferior leads.    Cath study : n/a    Jan 2019    1.  There is a perfusion defect in the inferior wall of the left ventricle. There is some reversibility in the apical aspect of the inferior wall of the left ventricle.  The reversibility is characteristic of ischemia.    2.  There is mild generalized hypokinesis.    3.  The left ventricular ejection fraction was calculated to be 48%.  The normal is greater than 54%.      Assessment:     1. Essential hypertension    2. Chronic kidney disease (CKD) stage G3b/A1, moderately decreased glomerular filtration rate (GFR) between 30-44 mL/min/1.73 square meter and albuminuria creatinine ratio less than 30 mg/g    3. Acute on chronic combined systolic and diastolic congestive heart failure, NYHA class 4    4. Abnormal stress test    5. H/O myocardial ischemia        Plan:     1. Combines, ischemia/nonischemic CM  - c/w coreg/acei  - will increase lasix to 40mg BID, then 40md daily after 2 weeks  - repeat bmp in 1-2 weeks  - will follow up pedal edema in 2 weeks    2. HTN  - controlled, c/w home medications    3. CKD  - will repeat bmp    4. Hx of dvt  - c/w eliquis    5. Abnormal stress:L   Inferior wall ischemia with WMA  - c/w asa/statin/plavix    Continue with current medical plan and lifestyle changes.  Return sooner for concerns or questions. If symptoms persist go to the ED  Total duration of face to face visit time 30 minutes.  Total time spent counseling greater than fifty percent of total visit time.  Counseling included discussion regarding imaging findings, diagnosis, possibilities, treatment options, risks and benefits.      Orders Placed This Encounter   Procedures    Basic metabolic panel     Standing Status:   Future     Standing Expiration Date:   1/31/2021       Follow up as scheduled. Return to clinic in 1-2 weeks.   She expressed verbal understanding and agreed with the plan    Thank you  for the opportunity to care for this patient. Will be available for questions if needed.     Segundo Roberts MD [facility]  Interventional Cardiology  [facility]  Pager: [phone]

## 2019-12-04 ENCOUNTER — TELEPHONE (OUTPATIENT)
Dept: CARDIOLOGY | Facility: CLINIC | Age: 84
End: 2019-12-04

## 2019-12-04 NOTE — TELEPHONE ENCOUNTER
----- Message from Paul Stoll sent at 12/4/2019  1:38 PM CST -----  Contact: patient  Patient called to speak with your office about a medication that her primary care advised her to take.    It is a higher dosage of LASIX and she was told to take a potassium chloride (MICRO-K) 10 MEQ CpSR capsule along with it by her primary care.     She wants to know if she is to still continue taking them together.    Please call [phone] to discuss today.

## 2019-12-05 NOTE — TELEPHONE ENCOUNTER
I spoke to patient I let her know she should take:   Lasix 40mg BID, then 40md daily after 2 weeks.     Yes it is a higher dose. It is need due to her pedal edema. Patient understood.      Patient said she was told to take a potassium chloride (MICRO-K) 10 MEQ CpSR capsule along with the Lasix  by her primary care.      She wants to know if she is to still continue taking them together.

## 2019-12-06 ENCOUNTER — TELEPHONE (OUTPATIENT)
Dept: CARDIOLOGY | Facility: CLINIC | Age: 84
End: 2019-12-06

## 2019-12-06 NOTE — TELEPHONE ENCOUNTER
Spoke to patient this morning regarding getting her lab prior to seeing  on 12-13-19.    Patient stated when she saw  he told her it was ok to get Lab done 12-11-19.    Patient is going to [facility] to get lab done .      drew Calvo (rita).

## 2019-12-10 DIAGNOSIS — I25.5 ISCHEMIC CARDIOMYOPATHY: Primary | ICD-10-CM

## 2019-12-12 ENCOUNTER — TELEPHONE (OUTPATIENT)
Dept: CARDIOLOGY | Facility: CLINIC | Age: 84
End: 2019-12-12

## 2019-12-12 NOTE — TELEPHONE ENCOUNTER
[facility] call  Requesting  BMP lab Orders fax  To them today , Patient is there now.    Faxed to [phone]      Amanda Calvo (rita).

## 2019-12-13 ENCOUNTER — TELEPHONE (OUTPATIENT)
Dept: NEPHROLOGY | Facility: CLINIC | Age: 84
End: 2019-12-13

## 2019-12-13 ENCOUNTER — OFFICE VISIT (OUTPATIENT)
Dept: CARDIOLOGY | Facility: CLINIC | Age: 84
End: 2019-12-13
Payer: MEDICARE

## 2019-12-13 VITALS
BODY MASS INDEX: 22.93 KG/M2 | DIASTOLIC BLOOD PRESSURE: 62 MMHG | SYSTOLIC BLOOD PRESSURE: 124 MMHG | WEIGHT: 151.31 LBS | HEART RATE: 57 BPM | HEIGHT: 68 IN

## 2019-12-13 DIAGNOSIS — E87.5 HYPERKALEMIA: ICD-10-CM

## 2019-12-13 DIAGNOSIS — N18.32 CHRONIC KIDNEY DISEASE (CKD) STAGE G3B/A1, MODERATELY DECREASED GLOMERULAR FILTRATION RATE (GFR) BETWEEN 30-44 ML/MIN/1.73 SQUARE METER AND ALBUMINURIA CREATININE RATIO LESS THAN 30 MG/G: Primary | ICD-10-CM

## 2019-12-13 DIAGNOSIS — I50.43 ACUTE ON CHRONIC COMBINED SYSTOLIC AND DIASTOLIC CONGESTIVE HEART FAILURE, NYHA CLASS 4: ICD-10-CM

## 2019-12-13 DIAGNOSIS — R93.1 DECREASED CARDIAC EJECTION FRACTION: ICD-10-CM

## 2019-12-13 DIAGNOSIS — I10 ESSENTIAL HYPERTENSION: ICD-10-CM

## 2019-12-13 PROCEDURE — 1126F AMNT PAIN NOTED NONE PRSNT: CPT | Mod: S$GLB,,, | Performed by: STUDENT IN AN ORGANIZED HEALTH CARE EDUCATION/TRAINING PROGRAM

## 2019-12-13 PROCEDURE — 1159F MED LIST DOCD IN RCRD: CPT | Mod: S$GLB,,, | Performed by: STUDENT IN AN ORGANIZED HEALTH CARE EDUCATION/TRAINING PROGRAM

## 2019-12-13 PROCEDURE — 99214 PR OFFICE/OUTPT VISIT, EST, LEVL IV, 30-39 MIN: ICD-10-PCS | Mod: S$GLB,,, | Performed by: STUDENT IN AN ORGANIZED HEALTH CARE EDUCATION/TRAINING PROGRAM

## 2019-12-13 PROCEDURE — 1159F PR MEDICATION LIST DOCUMENTED IN MEDICAL RECORD: ICD-10-PCS | Mod: S$GLB,,, | Performed by: STUDENT IN AN ORGANIZED HEALTH CARE EDUCATION/TRAINING PROGRAM

## 2019-12-13 PROCEDURE — 99214 OFFICE O/P EST MOD 30 MIN: CPT | Mod: S$GLB,,, | Performed by: STUDENT IN AN ORGANIZED HEALTH CARE EDUCATION/TRAINING PROGRAM

## 2019-12-13 PROCEDURE — 1126F PR PAIN SEVERITY QUANTIFIED, NO PAIN PRESENT: ICD-10-PCS | Mod: S$GLB,,, | Performed by: STUDENT IN AN ORGANIZED HEALTH CARE EDUCATION/TRAINING PROGRAM

## 2019-12-13 RX ORDER — FUROSEMIDE 40 MG/1
40 TABLET ORAL DAILY
Qty: 30 TABLET | Refills: 5 | Status: SHIPPED | OUTPATIENT
Start: 2019-12-13 | End: 2020-03-06 | Stop reason: SDUPTHER

## 2019-12-13 NOTE — PROGRESS NOTES
"   Cardiology Clinic note    Subjective:   Patient ID:  Adilia Rosas is a 84 y.o. female who presents for follow-up of HTN,     HPI:   Adilia Rosas  has a past medical history of Arthritis, Elevated cholesterol, GERD (gastroesophageal reflux disease), History of DVT (deep vein thrombosis) (2016), and Hypertension.    85 y/o AA female with the above medical problems is here for f/u. She is doing well since previous appointment with no changes.   Nuclear stress showed apical infarct with some ischemia which was small so decided to treat medically. BP is controlled at home.   She denies chest pain or dyspnea or orthopnea/PND.   She is compliant with medications. Weight has increase 10 pounds and she has 3+ pitting edema with venous stasis ulcer on her left leg.     She is on Eliquis by IM for DVTs. plavix for abn stress in Jan 2019. No symptomatic from her heart failure.    12/13: Here for two week follow up. Pt had increase diuresis with lasix 40mg BID. Her leg edema is now trace-1+. She is down nearly 10 pounds since last visit as guille.  Review of labs: cr up to ~3 and K up to 6 from 5.3  - will now back down to lasix 40mg daily, will hold lisinopril until hyperkalemia improves. Will give 1 time dose of kayexalate   Pt is still making urine. Advised if the patient stops making urine, she should go to [facility].  Pt feels better but notes more cramping in her legs.      Vitals  Vitals:    12/13/19 0929   BP: 124/62   Pulse: (!) 57   Weight: 68.6 kg (151 lb 4.8 oz)   Height: 5' 8" (1.727 m)       Patient Active Problem List    Diagnosis Date Noted    Abnormal stress test 12/03/2019    Mild aortic regurgitation 02/15/2019    Mild mitral and aortic regurgitation 02/15/2019    Heart failure     Femoral neck fracture     Hyperkalemia     Pulmonary hypertension     Diastolic dysfunction     Decreased cardiac ejection fraction     Fall on same level from slipping, tripping and stumbling without subsequent " striking against object, initial encounter 11/12/2018    Acute on chronic combined systolic and diastolic congestive heart failure, NYHA class 4 10/16/2018    Hypertensive urgency 10/16/2018    Cardiomyopathy 10/16/2018    Chronic kidney disease (CKD) stage G3b/A1, moderately decreased glomerular filtration rate (GFR) between 30-44 mL/min/1.73 square meter and albuminuria creatinine ratio less than 30 mg/g 02/07/2017    Fracture of femur 01/13/2017    Gastroesophageal reflux disease 01/10/2017    Arthritis 01/10/2017    H/O myocardial ischemia     Post op infection 01/06/2017    Hypomagnesemia 12/30/2016    Hypophosphatemia 12/30/2016    Anemia 12/30/2016    Essential hypertension 12/27/2016    Staphylococcus aureus infection     Postoperative infection 12/26/2016    Closed fracture of distal end of left femur 12/07/2016       Patient's Medications   New Prescriptions    SODIUM POLYSTYRENE (KAYEXALATE) 15 GRAM/60 ML SUSP    Take 60 mLs (15 g total) by mouth once. for 1 dose   Previous Medications    ACETAMINOPHEN (TYLENOL) 500 MG TABLET    Take 500 mg by mouth every 6 (six) hours as needed for Pain.    ALBUTEROL SULFATE 2.5 MG/0.5 ML NEBU    Take 5 mg by nebulization every 6 (six) hours while awake.    AMLODIPINE (NORVASC) 5 MG TABLET    Take 1 tablet (5 mg total) by mouth once daily.    APIXABAN (ELIQUIS) 5 MG TAB    Take 5 mg by mouth once daily.    ATORVASTATIN (LIPITOR) 80 MG TABLET    Take 80 mg by mouth once daily.    CARVEDILOL (COREG) 6.25 MG TABLET        CLOPIDOGREL (PLAVIX) 75 MG TABLET    Take 1 tablet (75 mg total) by mouth once daily.    DOCUSATE SODIUM (STOOL SOFTENER) 100 MG CAPSULE    Take 100 mg by mouth 2 (two) times daily.    FERROUS GLUCONATE 324 MG (37.5 MG IRON) TAB    Take 1 tablet (324 mg total) by mouth daily with breakfast.    HYDROCODONE-ACETAMINOPHEN 5-325MG (NORCO) 5-325 MG PER TABLET    Take 2 tablets by mouth every 4 (four) hours as needed.    POTASSIUM CHLORIDE  (MICRO-K) 10 MEQ CPSR       Modified Medications    Modified Medication Previous Medication    FUROSEMIDE (LASIX) 40 MG TABLET furosemide (LASIX) 40 MG tablet       Take 1 tablet (40 mg total) by mouth once daily.    Take 1 tablet (40 mg total) by mouth 2 (two) times daily for 14 days, THEN 1 tablet (40 mg total) once daily.   Discontinued Medications    LISINOPRIL (PRINIVIL,ZESTRIL) 2.5 MG TABLET    Take 1 tablet (2.5 mg total) by mouth once daily.         Review of Systems   Constitution: Negative for chills, decreased appetite, malaise/fatigue and weight gain.   HENT: Negative for congestion and ear discharge.    Eyes: Negative for blurred vision and double vision.   Cardiovascular: Positive for leg swelling. Negative for chest pain, cyanosis, dyspnea on exertion, irregular heartbeat, near-syncope, orthopnea, palpitations and syncope.   Respiratory: Negative for cough, shortness of breath and sleep disturbances due to breathing.    Skin: Positive for poor wound healing. Negative for color change and dry skin.   Musculoskeletal: Negative for joint pain, joint swelling and muscle cramps.   Gastrointestinal: Negative for bloating, heartburn, hematemesis and hematochezia.   Genitourinary: Negative for bladder incontinence and dysuria.   Neurological: Negative for aphonia, excessive daytime sleepiness, dizziness, focal weakness, headaches, light-headedness, loss of balance and weakness.   Psychiatric/Behavioral: Negative for altered mental status, depression and memory loss. The patient does not have insomnia and is not nervous/anxious.          Objective:   Physical Exam   Constitutional: She is oriented to person, place, and time. She appears well-developed and well-nourished.   HENT:   Head: Normocephalic and atraumatic.   Eyes: Conjunctivae and EOM are normal.   Neck: Normal range of motion. Neck supple. No JVD present.   Cardiovascular: Normal rate, regular rhythm and normal heart sounds.   No murmur  heard.  Pulmonary/Chest: Effort normal and breath sounds normal. No respiratory distress. She has no wheezes. She has no rales.   Abdominal: Soft. Bowel sounds are normal. She exhibits no distension.   Musculoskeletal: Normal range of motion. She exhibits edema.   3+ pitting edema > 1+/trace   Neurological: She is alert and oriented to person, place, and time.   Skin: Skin is warm and dry. No erythema.   Psychiatric: She has a normal mood and affect. Her behavior is normal. Judgment and thought content normal.   Nursing note and vitals reviewed.      Lab Results    Lab Results   Component Value Date     11/19/2018    K 5.3 (H) 11/19/2018     11/19/2018    CO2 26 11/19/2018    BUN 34 (H) 11/19/2018    CREATININE 2.1 (H) 11/19/2018    GLU 95 11/19/2018    HGBA1C 4.8 11/12/2018    MG 1.7 11/19/2018    AST 30 11/19/2018    ALT 7 (L) 11/19/2018    ALBUMIN 2.3 (L) 11/19/2018    PROT 5.6 (L) 11/19/2018    BILITOT 0.5 11/19/2018    WBC 5.79 11/19/2018    HGB 8.6 (L) 11/19/2018    HCT 28.2 (L) 11/19/2018    MCV 96 11/19/2018     11/19/2018    INR 1.1 11/13/2018    INR 2.0 (H) 03/07/2007    TSH 0.983 11/12/2018    CHOL 129 11/29/2006    HDL 38.0 (L) 11/29/2006    LDLCALC 72.4 11/29/2006    TRIG 93 11/29/2006    CRP 28.0 (H) 03/10/2017     (H) 11/15/2018       Lipid panel  Lab Results   Component Value Date    CHOL 129 11/29/2006     Lab Results   Component Value Date    HDL 38.0 (L) 11/29/2006     Lab Results   Component Value Date    LDLCALC 72.4 11/29/2006     Lab Results   Component Value Date    TRIG 93 11/29/2006       K is up to 6.1 by 12/12 labs  - cr up to 3 from 2.    Cardiac Studies  Significant Imaging: Echocardiogram:   2D echo with color flow doppler:   Results for orders placed or performed during the hospital encounter of 09/19/16   2D echo with color flow doppler   Result Value Ref Range    QEF 55 55 - 65    Mitral Valve Regurgitation MILD     Diastolic Dysfunction No     Aortic Valve  Regurgitation TRIVIAL     Est. PA Systolic Pressure 37.57     Tricuspid Valve Regurgitation TRIVIAL     Narrative    Date of Procedure: 09/19/2016        TEST DESCRIPTION   Technical Quality: This is a technically adequate study.     Aorta: The aortic root is normal in size, measuring 2.5 cm at sinotubular junction.     Left Atrium: The left atrial volume index is mildly enlarged, measuring 80.68 cc/m2.     Left Ventricle: The left ventricle is normal in size, with an end-diastolic diameter of 4.8 cm, and an end-systolic diameter of 3.6 cm. LV wall thickness is normal, with the septum and the posterior wall each measuring 0.9 cm across. Relative wall   thickness was normal at 0.38, and the LV mass index was 100.2 g/m2 consistent with normal left ventricular mass. Global left ventricular systolic function appears normal. Visually estimated ejection fraction is 55-60%. The LV Doppler derived stroke   volume equals 98.0 ccs.   The E/e'(lat) is 7, consistent with normal diastolic function.     Right Atrium: The right atrium is normal in size, measuring 6.2 cm in length in the apical view.     Right Ventricle: The right ventricle is normal in size measuring 2.5 cm at the base in the apical right ventricle-focused view. Global right ventricular systolic function appears normal. The estimated PA systolic pressure is 38 mmHg.     Aortic Valve:  The aortic valve is mildly sclerotic. Additionally, there is trivial aortic regurgitation.     Mitral Valve:  The mitral valve is normal in structure. There is mild mitral regurgitation.     Tricuspid Valve:  There is trivial tricuspid regurgitation.     IVC: IVC is normal in size and collapses > 50% with a sniff, suggesting normal right atrial pressure of 3 mmHg.     Intracavitary: There is no evidence of pericardial effusion, intracavity mass, thrombi, or vegetation.         CONCLUSIONS     1 - Mild left atrial enlargement.     2 - Normal left ventricular systolic function (EF  55-60%).     3 - Normal right ventricular systolic function .     4 - The estimated PA systolic pressure is 38 mmHg.     5 - Trivial aortic regurgitation.     6 - Mild mitral regurgitation.     7 - Trivial tricuspid regurgitation.     8 - Grade 1 diastolic dysfunction .             This document has been electronically    SIGNED BY: Luke Hackett MD On: 09/19/2016 13:28    and Transthoracic echo (TTE) complete (Cupid Only):   Results for orders placed or performed during the hospital encounter of 11/12/18   Transthoracic echo (TTE) complete (Cupid Only)   Result Value Ref Range    LA WIDTH 4.91 cm    PV PEAK VELOCITY 1.06 cm/s    LVIDD 5.49 3.5 - 6.0 cm    IVS 1.20 (A) 0.6 - 1.1 cm    PW 1.20 (A) 0.6 - 1.1 cm    Ao root annulus 3.04 cm    LVIDS 3.55 2.1 - 4.0 cm    FS 35 28 - 44 %    LA volume 113.86 cm3    LV mass 271.58 g    LA size 4.35 cm    RVDD 2.52 cm    Left Ventricle Relative Wall Thickness 0.44 cm    AV mean gradient 7.07 mmHg    AV valve area 2.00 cm2    E/A ratio 0.57     E wave decelartion time 277.30 msec    Pulm vein S/D ratio 1.55     LVOT diameter 2.05 cm    LVOT area 3.30 cm2    LVOT peak VTI 25.07 cm    Ao peak alejandra 1.92 m/s    Ao VTI 41.40 cm    LVOT stroke volume 82.70 cm3    AV peak gradient 14.75 mmHg    MV Peak E Alejandra 0.64 m/s    TR Max Alejandra 3.32 m/s    MV Peak A Alejandra 1.12 m/s    PV Peak S Alejandra 0.45 m/s    PV Peak D Alejandra 0.29 m/s    LV Systolic Volume 52.70 mL    LV Diastolic Volume 147.04 mL    RA Major Axis 5.65 cm    Left Atrium Minor Axis 6.13 cm    Left Atrium Major Axis 6.42 cm    Triscuspid Valve Regurgitation Peak Gradient 44.09 mmHg    BSA 1.86 m2    LV Systolic Volume Index 28.3 mL/m2    LV Diastolic Volume Index 79.05 mL/m2    LA Volume Index 61.2 mL/m2    LV Mass Index 146.0 g/m2    Right Atrial Pressure (from IVC) 15 mmHg    TV rest pulmonary artery pressure 59.09 mmHg    Narrative    · The left ventricle cavity is moderately dilated.  · Left ventricle shows mild concentric  hypertrophy.  · Left ventricle ejection fraction is moderately decreased at 35-40%  · Grade I (mild) left ventricular diastolic dysfunction consistent with   impaired relaxation.  · LA pressure is normal.  · Local segmental wall motion abnormalities. The mid inferior wall and mid   LVPW are akinetic  · Left atrium is moderately dilated.  · RV systolic function is normal.  · Right atrium is mildly dilated.  · Mild aortic regurgitation.  · Mild mitral regurgitation.  · Mild tricuspid regurgitation.  · Pulmonic valve shows mild regurgitation.  · Elevated central venous pressure (15 mm Hg).  · The estimated PA systolic pressure is 59.09 mm Hg  · Pulmonary hypertension present.  · No pericardial effusion.        ECG:  normal EKG, normal sinus rhythm, unchanged from previous tracings, Q waves in inferior leads.    Cath study : n/a    Jan 2019    1.  There is a perfusion defect in the inferior wall of the left ventricle. There is some reversibility in the apical aspect of the inferior wall of the left ventricle.  The reversibility is characteristic of ischemia.    2.  There is mild generalized hypokinesis.    3.  The left ventricular ejection fraction was calculated to be 48%.  The normal is greater than 54%.      Assessment:     1. Chronic kidney disease (CKD) stage G3b/A1, moderately decreased glomerular filtration rate (GFR) between 30-44 mL/min/1.73 square meter and albuminuria creatinine ratio less than 30 mg/g    2. Acute on chronic combined systolic and diastolic congestive heart failure, NYHA class 4    3. Decreased cardiac ejection fraction    4. Essential hypertension    5. Hyperkalemia        Plan:     1. Combines, ischemia/nonischemic CM  - c/w coreg/acei  - will decrease lasix to 40mg daily   - repeat bmp in 1-2 weeks again  - edema is stable now    2. HTN  - controlled, c/w home medications    3. Acute on chronic kidney disease  - ok for her to drink WATER as needed for thirst  - hyperkalemia seems to be a  chronic issue for her.  - will refer to nephrology, ? Na bicarb?  - will repeat bmp in 1-2 weeks  - will - back down to lasix 40mg daily, will hold lisinopril until hyperkalemia improves. Will give 1 time dose of kayexalate now    4. Hx of dvt  - c/w eliquis    5. Abnormal stress:L   Inferior wall ischemia with WMA  - c/w asa/statin/plavix    Continue with current medical plan and lifestyle changes.  Return sooner for concerns or questions. If symptoms persist go to the ED  Total duration of face to face visit time 30 minutes.  Total time spent counseling greater than fifty percent of total visit time.  Counseling included discussion regarding imaging findings, diagnosis, possibilities, treatment options, risks and benefits.      Orders Placed This Encounter   Procedures    Basic metabolic panel     Standing Status:   Future     Standing Expiration Date:   2/10/2021    Ambulatory Referral to Nephrology     Referral Priority:   Routine     Referral Type:   Consultation     Referral Reason:   Specialty Services Required     Requested Specialty:   Nephrology     Number of Visits Requested:   1       Follow up as scheduled. Return to clinic in 3 months, nephrology referal. 1-2 weeks repeat bmp.  She expressed verbal understanding and agreed with the plan    Thank you for the opportunity to care for this patient. Will be available for questions if needed.     Segundo Roberts MD [facility]  Interventional Cardiology  [facility]  Pager: [phone]

## 2019-12-26 RX ORDER — AMLODIPINE BESYLATE 5 MG/1
5 TABLET ORAL DAILY
Qty: 30 TABLET | Refills: 11 | Status: SHIPPED | OUTPATIENT
Start: 2019-12-26 | End: 2020-03-06 | Stop reason: SDUPTHER

## 2019-12-30 ENCOUNTER — TELEPHONE (OUTPATIENT)
Dept: NEPHROLOGY | Facility: CLINIC | Age: 84
End: 2019-12-30

## 2019-12-30 DIAGNOSIS — N18.30 CHRONIC KIDNEY DISEASE, STAGE III (MODERATE): Primary | ICD-10-CM

## 2020-01-02 ENCOUNTER — TELEPHONE (OUTPATIENT)
Dept: NEPHROLOGY | Facility: CLINIC | Age: 85
End: 2020-01-02

## 2020-02-12 NOTE — TELEPHONE ENCOUNTER
I called Pati from [facility] back and informed her of the message from Dr. Roberts in regards to the pt's medications.     ND

## 2020-02-12 NOTE — TELEPHONE ENCOUNTER
----- Message from Segundo Roberts MD sent at 2/12/2020 10:26 AM CST -----  Yes, the patient should be on both plavix and eliquis    Ok to decrease coreg to 3.125 BID and norvasc to 2.5 daily.    VR  ----- Message -----  From: Serena Phoenix MA  Sent: 2/11/2020  11:57 AM CST  To: MD Pati Serna, with [facility] called in regards to the patient's medication.  She is wondering if the pt should be on both Plavix and Eliquis.    She stated the pt's BP on Saturday 02- was 100/60 and the pt was feeling weak and dizzy.  Today's BP is 110/60 and the pt does not feel weak or dizzy, and has been drinking more water.    Pati from [facility] also stated the pt is taking Coreg 6.25 mg BID, Norvasc 5 mg, and lotensin 20 mg BID.    I informed the pt has a follow up appt on March 6, 2020 and that is the earliest available appointment in [place].    Please advise.  ND

## 2020-03-06 ENCOUNTER — OFFICE VISIT (OUTPATIENT)
Dept: CARDIOLOGY | Facility: CLINIC | Age: 85
End: 2020-03-06
Payer: MEDICARE

## 2020-03-06 ENCOUNTER — TELEPHONE (OUTPATIENT)
Dept: HOME HEALTH SERVICES | Facility: HOSPITAL | Age: 85
End: 2020-03-06

## 2020-03-06 VITALS
HEART RATE: 61 BPM | DIASTOLIC BLOOD PRESSURE: 54 MMHG | WEIGHT: 149.19 LBS | BODY MASS INDEX: 25.47 KG/M2 | SYSTOLIC BLOOD PRESSURE: 110 MMHG | HEIGHT: 64 IN

## 2020-03-06 DIAGNOSIS — I35.1 MILD AORTIC REGURGITATION: ICD-10-CM

## 2020-03-06 DIAGNOSIS — R93.1 DECREASED CARDIAC EJECTION FRACTION: ICD-10-CM

## 2020-03-06 DIAGNOSIS — I50.43 ACUTE ON CHRONIC COMBINED SYSTOLIC AND DIASTOLIC CONGESTIVE HEART FAILURE, NYHA CLASS 4: Primary | ICD-10-CM

## 2020-03-06 DIAGNOSIS — I08.0 MILD MITRAL AND AORTIC REGURGITATION: ICD-10-CM

## 2020-03-06 DIAGNOSIS — I51.89 DIASTOLIC DYSFUNCTION: ICD-10-CM

## 2020-03-06 DIAGNOSIS — N18.32 CHRONIC KIDNEY DISEASE (CKD) STAGE G3B/A1, MODERATELY DECREASED GLOMERULAR FILTRATION RATE (GFR) BETWEEN 30-44 ML/MIN/1.73 SQUARE METER AND ALBUMINURIA CREATININE RATIO LESS THAN 30 MG/G: ICD-10-CM

## 2020-03-06 PROCEDURE — 99214 PR OFFICE/OUTPT VISIT, EST, LEVL IV, 30-39 MIN: ICD-10-PCS | Mod: S$GLB,,, | Performed by: STUDENT IN AN ORGANIZED HEALTH CARE EDUCATION/TRAINING PROGRAM

## 2020-03-06 PROCEDURE — 99214 OFFICE O/P EST MOD 30 MIN: CPT | Mod: S$GLB,,, | Performed by: STUDENT IN AN ORGANIZED HEALTH CARE EDUCATION/TRAINING PROGRAM

## 2020-03-06 RX ORDER — CLOPIDOGREL BISULFATE 75 MG/1
75 TABLET ORAL DAILY
Qty: 90 TABLET | Refills: 3 | Status: SHIPPED | OUTPATIENT
Start: 2020-03-06 | End: 2021-05-10

## 2020-03-06 RX ORDER — BENAZEPRIL HYDROCHLORIDE 20 MG/1
20 TABLET ORAL DAILY
Qty: 90 TABLET | Refills: 3 | Status: SHIPPED | OUTPATIENT
Start: 2020-03-06

## 2020-03-06 RX ORDER — CIPROFLOXACIN 500 MG/1
500 TABLET ORAL 2 TIMES DAILY
COMMUNITY

## 2020-03-06 RX ORDER — AMLODIPINE BESYLATE 5 MG/1
5 TABLET ORAL DAILY
Qty: 90 TABLET | Refills: 3 | Status: SHIPPED | OUTPATIENT
Start: 2020-03-06 | End: 2021-05-10

## 2020-03-06 RX ORDER — CARVEDILOL 6.25 MG/1
6.25 TABLET ORAL 2 TIMES DAILY
Qty: 180 TABLET | Refills: 3 | Status: SHIPPED | OUTPATIENT
Start: 2020-03-06 | End: 2020-03-11 | Stop reason: SDUPTHER

## 2020-03-06 RX ORDER — FUROSEMIDE 40 MG/1
40 TABLET ORAL DAILY
Qty: 30 TABLET | Refills: 5 | Status: SHIPPED | OUTPATIENT
Start: 2020-03-06 | End: 2021-05-10

## 2020-03-06 RX ORDER — BENAZEPRIL HYDROCHLORIDE 20 MG/1
20 TABLET ORAL DAILY
COMMUNITY
End: 2020-03-06 | Stop reason: SDUPTHER

## 2020-03-06 RX ORDER — ATORVASTATIN CALCIUM 20 MG/1
20 TABLET, FILM COATED ORAL DAILY
Qty: 90 TABLET | Refills: 3 | Status: SHIPPED | OUTPATIENT
Start: 2020-03-06

## 2020-03-06 NOTE — PROGRESS NOTES
"   Cardiology Clinic note    Subjective:   Patient ID:  Adilia Rosas is a 84 y.o. female who presents for follow-up of HTN,     HPI:   Adilia Rosas  has a past medical history of Anemia, Arthritis, CKD (chronic kidney disease) stage 4, GFR 15-29 ml/min, Edema, Elevated cholesterol, GERD (gastroesophageal reflux disease), History of DVT (deep vein thrombosis) (2016), Hyperkalemia, Hypertension, and Proteinuria.    85 y/o AA female with the above medical problems is here for f/u. She is doing well since previous appointment with no changes.   Nuclear stress showed apical infarct with some ischemia which was small so decided to treat medically. BP is controlled at home.   She denies chest pain or dyspnea or orthopnea/PND.   She is compliant with medications. Weight has increase 10 pounds and she has 3+ pitting edema with venous stasis ulcer on her left leg.     She is on Eliquis by IM for DVTs. plavix for abn stress in Jan 2019. No symptomatic from her heart failure.    12/13: Here for two week follow up. Pt had increase diuresis with lasix 40mg BID. Her leg edema is now trace-1+. She is down nearly 10 pounds since last visit as guille.  Review of labs: cr up to ~3 and K up to 6 from 5.3    3/6/2020: Pt seen and examined. Now following up with nephrology. Cant see renal lab work.     Vitals  Vitals:    03/06/20 1036   BP: (!) 110/54   Pulse: 61   Weight: 67.7 kg (149 lb 3.2 oz)   Height: 5' 4" (1.626 m)       Patient Active Problem List    Diagnosis Date Noted    Abnormal stress test 12/03/2019    Mild aortic regurgitation 02/15/2019    Mild mitral and aortic regurgitation 02/15/2019    Heart failure     Femoral neck fracture     Hyperkalemia     Pulmonary hypertension     Diastolic dysfunction     Decreased cardiac ejection fraction     Fall on same level from slipping, tripping and stumbling without subsequent striking against object, initial encounter 11/12/2018    Acute on chronic combined systolic and " diastolic congestive heart failure, NYHA class 4 10/16/2018    Hypertensive urgency 10/16/2018    Cardiomyopathy 10/16/2018    Chronic kidney disease (CKD) stage G3b/A1, moderately decreased glomerular filtration rate (GFR) between 30-44 mL/min/1.73 square meter and albuminuria creatinine ratio less than 30 mg/g 02/07/2017    Fracture of femur 01/13/2017    Gastroesophageal reflux disease 01/10/2017    Arthritis 01/10/2017    H/O myocardial ischemia     Post op infection 01/06/2017    Hypomagnesemia 12/30/2016    Hypophosphatemia 12/30/2016    Anemia 12/30/2016    Essential hypertension 12/27/2016    Staphylococcus aureus infection     Postoperative infection 12/26/2016    Closed fracture of distal end of left femur 12/07/2016       Patient's Medications   New Prescriptions    ATORVASTATIN (LIPITOR) 20 MG TABLET    Take 1 tablet (20 mg total) by mouth once daily.   Previous Medications    ACETAMINOPHEN (TYLENOL) 500 MG TABLET    Take 500 mg by mouth every 6 (six) hours as needed for Pain.    ALBUTEROL SULFATE 2.5 MG/0.5 ML NEBU    Take 5 mg by nebulization every 6 (six) hours while awake.    ALENDRONATE-VITAMIN D3 (FOSAMAX PLUS D) 70 MG- 2,800 UNIT PER TABLET    Take 1 tablet by mouth every 7 days.    APIXABAN (ELIQUIS) 5 MG TAB    Take 5 mg by mouth once daily.    CIPROFLOXACIN HCL (CIPRO) 500 MG TABLET    Take 500 mg by mouth 2 (two) times daily.    DOCUSATE SODIUM (STOOL SOFTENER) 100 MG CAPSULE    Take 100 mg by mouth 2 (two) times daily.    FERROUS GLUCONATE 324 MG (37.5 MG IRON) TAB    Take 1 tablet (324 mg total) by mouth daily with breakfast.    HYDROCODONE-ACETAMINOPHEN 5-325MG (NORCO) 5-325 MG PER TABLET    Take 2 tablets by mouth every 4 (four) hours as needed.    POTASSIUM CHLORIDE (MICRO-K) 10 MEQ CPSR       Modified Medications    Modified Medication Previous Medication    AMLODIPINE (NORVASC) 5 MG TABLET amLODIPine (NORVASC) 5 MG tablet       Take 1 tablet (5 mg total) by mouth once  daily.    Take 1 tablet (5 mg total) by mouth once daily.    BENAZEPRIL (LOTENSIN) 20 MG TABLET benazepriL (LOTENSIN) 20 MG tablet       Take 1 tablet (20 mg total) by mouth once daily.    Take 20 mg by mouth once daily.    CARVEDILOL (COREG) 6.25 MG TABLET carvedilol (COREG) 6.25 MG tablet       Take 1 tablet (6.25 mg total) by mouth 2 (two) times daily.        CLOPIDOGREL (PLAVIX) 75 MG TABLET clopidogrel (PLAVIX) 75 mg tablet       Take 1 tablet (75 mg total) by mouth once daily.    Take 1 tablet (75 mg total) by mouth once daily.    FUROSEMIDE (LASIX) 40 MG TABLET furosemide (LASIX) 40 MG tablet       Take 1 tablet (40 mg total) by mouth once daily.    Take 1 tablet (40 mg total) by mouth once daily.   Discontinued Medications    ATORVASTATIN (LIPITOR) 80 MG TABLET    Take 80 mg by mouth once daily.         Review of Systems   Constitution: Negative for chills, decreased appetite, malaise/fatigue and weight gain.   HENT: Negative for congestion and ear discharge.    Eyes: Negative for blurred vision and double vision.   Cardiovascular: Positive for leg swelling. Negative for chest pain, cyanosis, dyspnea on exertion, irregular heartbeat, near-syncope, orthopnea, palpitations and syncope.   Respiratory: Negative for cough, shortness of breath and sleep disturbances due to breathing.    Skin: Positive for poor wound healing. Negative for color change and dry skin.   Musculoskeletal: Negative for joint pain, joint swelling and muscle cramps.   Gastrointestinal: Negative for bloating, heartburn, hematemesis and hematochezia.   Genitourinary: Negative for bladder incontinence and dysuria.   Neurological: Negative for aphonia, excessive daytime sleepiness, dizziness, focal weakness, headaches, light-headedness, loss of balance and weakness.   Psychiatric/Behavioral: Negative for altered mental status, depression and memory loss. The patient does not have insomnia and is not nervous/anxious.    Left venous ulcer.  Healing. Ongoing on and off for 10-15yrs      Objective:   Physical Exam   Constitutional: She is oriented to person, place, and time. She appears well-developed and well-nourished.   HENT:   Head: Normocephalic and atraumatic.   Eyes: Conjunctivae and EOM are normal.   Neck: Normal range of motion. Neck supple. No JVD present.   Cardiovascular: Normal rate, regular rhythm and normal heart sounds.   No murmur heard.  Pulmonary/Chest: Effort normal and breath sounds normal. No respiratory distress. She has no wheezes. She has no rales.   Abdominal: Soft. Bowel sounds are normal. She exhibits no distension.   Musculoskeletal: Normal range of motion. She exhibits edema.   3+ pitting edema > 1+/trace   Neurological: She is alert and oriented to person, place, and time.   Skin: Skin is warm and dry. No erythema.   Psychiatric: She has a normal mood and affect. Her behavior is normal. Judgment and thought content normal.   Nursing note and vitals reviewed.      Lab Results    Lab Results   Component Value Date     11/19/2018    K 5.3 (H) 11/19/2018     11/19/2018    CO2 26 11/19/2018    BUN 34 (H) 11/19/2018    CREATININE 2.1 (H) 11/19/2018    GLU 95 11/19/2018    HGBA1C 4.8 11/12/2018    MG 1.7 11/19/2018    AST 30 11/19/2018    ALT 7 (L) 11/19/2018    ALBUMIN 2.3 (L) 11/19/2018    PROT 5.6 (L) 11/19/2018    BILITOT 0.5 11/19/2018    WBC 5.79 11/19/2018    HGB 8.6 (L) 11/19/2018    HCT 28.2 (L) 11/19/2018    MCV 96 11/19/2018     11/19/2018    INR 1.1 11/13/2018    INR 2.0 (H) 03/07/2007    TSH 0.983 11/12/2018    CHOL 129 11/29/2006    HDL 38.0 (L) 11/29/2006    LDLCALC 72.4 11/29/2006    TRIG 93 11/29/2006    CRP 28.0 (H) 03/10/2017     (H) 11/15/2018       Lipid panel  Lab Results   Component Value Date    CHOL 129 11/29/2006     Lab Results   Component Value Date    HDL 38.0 (L) 11/29/2006     Lab Results   Component Value Date    LDLCALC 72.4 11/29/2006     Lab Results   Component Value  Date    TRIG 93 11/29/2006       K is up to 6.1 by 12/12 labs  - cr up to 3 from 2.    Cardiac Studies  Significant Imaging: Echocardiogram:   2D echo with color flow doppler:   Results for orders placed or performed during the hospital encounter of 09/19/16   2D echo with color flow doppler   Result Value Ref Range    QEF 55 55 - 65    Mitral Valve Regurgitation MILD     Diastolic Dysfunction No     Aortic Valve Regurgitation TRIVIAL     Est. PA Systolic Pressure 37.57     Tricuspid Valve Regurgitation TRIVIAL     Narrative    Date of Procedure: 09/19/2016        TEST DESCRIPTION   Technical Quality: This is a technically adequate study.     Aorta: The aortic root is normal in size, measuring 2.5 cm at sinotubular junction.     Left Atrium: The left atrial volume index is mildly enlarged, measuring 80.68 cc/m2.     Left Ventricle: The left ventricle is normal in size, with an end-diastolic diameter of 4.8 cm, and an end-systolic diameter of 3.6 cm. LV wall thickness is normal, with the septum and the posterior wall each measuring 0.9 cm across. Relative wall   thickness was normal at 0.38, and the LV mass index was 100.2 g/m2 consistent with normal left ventricular mass. Global left ventricular systolic function appears normal. Visually estimated ejection fraction is 55-60%. The LV Doppler derived stroke   volume equals 98.0 ccs.   The E/e'(lat) is 7, consistent with normal diastolic function.     Right Atrium: The right atrium is normal in size, measuring 6.2 cm in length in the apical view.     Right Ventricle: The right ventricle is normal in size measuring 2.5 cm at the base in the apical right ventricle-focused view. Global right ventricular systolic function appears normal. The estimated PA systolic pressure is 38 mmHg.     Aortic Valve:  The aortic valve is mildly sclerotic. Additionally, there is trivial aortic regurgitation.     Mitral Valve:  The mitral valve is normal in structure. There is mild mitral  regurgitation.     Tricuspid Valve:  There is trivial tricuspid regurgitation.     IVC: IVC is normal in size and collapses > 50% with a sniff, suggesting normal right atrial pressure of 3 mmHg.     Intracavitary: There is no evidence of pericardial effusion, intracavity mass, thrombi, or vegetation.         CONCLUSIONS     1 - Mild left atrial enlargement.     2 - Normal left ventricular systolic function (EF 55-60%).     3 - Normal right ventricular systolic function .     4 - The estimated PA systolic pressure is 38 mmHg.     5 - Trivial aortic regurgitation.     6 - Mild mitral regurgitation.     7 - Trivial tricuspid regurgitation.     8 - Grade 1 diastolic dysfunction .             This document has been electronically    SIGNED BY: Luke Hackett MD On: 09/19/2016 13:28    and Transthoracic echo (TTE) complete (Cupid Only):   Results for orders placed or performed during the hospital encounter of 11/12/18   Transthoracic echo (TTE) complete (Cupid Only)   Result Value Ref Range    LA WIDTH 4.91 cm    PV PEAK VELOCITY 1.06 cm/s    LVIDD 5.49 3.5 - 6.0 cm    IVS 1.20 (A) 0.6 - 1.1 cm    PW 1.20 (A) 0.6 - 1.1 cm    Ao root annulus 3.04 cm    LVIDS 3.55 2.1 - 4.0 cm    FS 35 28 - 44 %    LA volume 113.86 cm3    LV mass 271.58 g    LA size 4.35 cm    RVDD 2.52 cm    Left Ventricle Relative Wall Thickness 0.44 cm    AV mean gradient 7.07 mmHg    AV valve area 2.00 cm2    E/A ratio 0.57     E wave decelartion time 277.30 msec    Pulm vein S/D ratio 1.55     LVOT diameter 2.05 cm    LVOT area 3.30 cm2    LVOT peak VTI 25.07 cm    Ao peak alejandra 1.92 m/s    Ao VTI 41.40 cm    LVOT stroke volume 82.70 cm3    AV peak gradient 14.75 mmHg    MV Peak E Alejandra 0.64 m/s    TR Max Alejandra 3.32 m/s    MV Peak A Alejandra 1.12 m/s    PV Peak S Alejandra 0.45 m/s    PV Peak D Alejandra 0.29 m/s    LV Systolic Volume 52.70 mL    LV Diastolic Volume 147.04 mL    RA Major Axis 5.65 cm    Left Atrium Minor Axis 6.13 cm    Left Atrium Major Axis 6.42 cm     Triscuspid Valve Regurgitation Peak Gradient 44.09 mmHg    BSA 1.86 m2    LV Systolic Volume Index 28.3 mL/m2    LV Diastolic Volume Index 79.05 mL/m2    LA Volume Index 61.2 mL/m2    LV Mass Index 146.0 g/m2    Right Atrial Pressure (from IVC) 15 mmHg    TV rest pulmonary artery pressure 59.09 mmHg    Narrative    · The left ventricle cavity is moderately dilated.  · Left ventricle shows mild concentric hypertrophy.  · Left ventricle ejection fraction is moderately decreased at 35-40%  · Grade I (mild) left ventricular diastolic dysfunction consistent with   impaired relaxation.  · LA pressure is normal.  · Local segmental wall motion abnormalities. The mid inferior wall and mid   LVPW are akinetic  · Left atrium is moderately dilated.  · RV systolic function is normal.  · Right atrium is mildly dilated.  · Mild aortic regurgitation.  · Mild mitral regurgitation.  · Mild tricuspid regurgitation.  · Pulmonic valve shows mild regurgitation.  · Elevated central venous pressure (15 mm Hg).  · The estimated PA systolic pressure is 59.09 mm Hg  · Pulmonary hypertension present.  · No pericardial effusion.        ECG:  normal EKG, normal sinus rhythm, unchanged from previous tracings, Q waves in inferior leads.    Cath study : n/a    Jan 2019    1.  There is a perfusion defect in the inferior wall of the left ventricle. There is some reversibility in the apical aspect of the inferior wall of the left ventricle.  The reversibility is characteristic of ischemia.    2.  There is mild generalized hypokinesis.    3.  The left ventricular ejection fraction was calculated to be 48%.  The normal is greater than 54%.      Assessment:     1. Acute on chronic combined systolic and diastolic congestive heart failure, NYHA class 4    2. Diastolic dysfunction    3. Decreased cardiac ejection fraction    4. Mild aortic regurgitation    5. Mild mitral and aortic regurgitation    6. Chronic kidney disease (CKD) stage G3b/A1, moderately  decreased glomerular filtration rate (GFR) between 30-44 mL/min/1.73 square meter and albuminuria creatinine ratio less than 30 mg/g        Plan:     1. Combines, ischemia/nonischemic CM  - c/w coreg/acei  - will c/w lasix 40mg daily   - edema is stable now  On plavix, statin, no aspirin    2. HTN  - controlled, c/w home medications    3. Acute on chronic kidney disease  - ok for her to drink WATER as needed for thirst  - mx per nephrology    4. Hx of dvt  - c/w eliquis    5. Abnormal stress:L   Inferior wall ischemia with WMA  - c/w asa/statin/plavix    Continue with current medical plan and lifestyle changes.  Return sooner for concerns or questions. If symptoms persist go to the ED  Total duration of face to face visit time 30 minutes.  Total time spent counseling greater than fifty percent of total visit time.  Counseling included discussion regarding imaging findings, diagnosis, possibilities, treatment options, risks and benefits.      No orders of the defined types were placed in this encounter.      Follow up as scheduled. Return to clinic in 6 months, if wound isnt healed, will consider peripheral work up. Want renal function to be stable first  She expressed verbal understanding and agreed with the plan    Thank you for the opportunity to care for this patient. Will be available for questions if needed.     Segundo Roberts MD [facility]  Interventional Cardiology  [facility]  Pager: [phone]

## 2020-03-11 RX ORDER — CARVEDILOL 6.25 MG/1
6.25 TABLET ORAL 2 TIMES DAILY
Qty: 180 TABLET | Refills: 3 | Status: SHIPPED | OUTPATIENT
Start: 2020-03-11 | End: 2020-03-25 | Stop reason: SDUPTHER

## 2020-03-16 ENCOUNTER — TELEPHONE (OUTPATIENT)
Dept: CARDIOLOGY | Facility: CLINIC | Age: 85
End: 2020-03-16

## 2020-03-16 NOTE — TELEPHONE ENCOUNTER
Reached out to pt granddaughter Agnieszka, in regards to message     Pt granddaughter was informed that it is too soon for the pt to  her carvedilol prescription, per pharmacist    Pt last picked up her rx on 2/24 and will be able to obtain her refills next week     Requested a call back if any questions

## 2020-03-16 NOTE — TELEPHONE ENCOUNTER
----- Message from Chris Cornelius sent at 3/16/2020  3:40 PM CDT -----  Contact: [phone] Agnieszka  Patient's granddaughter is saying her grandmother doesn't have any refills left and needs her meds recalled in. Please call and advise/

## 2020-03-17 NOTE — TELEPHONE ENCOUNTER
If the patient is feeling fine, nothing to change. If the patient is feeling lightheaded or dizziness, then change norvasc 2.5 to every other day.    VR

## 2020-03-17 NOTE — TELEPHONE ENCOUNTER
I called Pati back in regards to the message from Dr. Roberts.   She did not answer, but I left a detailed voice message as well as a call back number.     ND

## 2020-03-17 NOTE — TELEPHONE ENCOUNTER
Pati from the pt's home health called stating the pt's BP this morning was 90/52 and has been 90/58 or 102/60 lately.   The pt's HR has been within normal limits: 68, 78 and 82.    The home health nurse stated she is concerned about the pt's BP dropping too low and is asking if the pt may need a dosage change.  The pt is currently taking Norvasc 2.5 mg QD and Coreg 3.125 mg BID.    She also stated the pt's Nephrologist stopped the pt's potassium medication and Lotensin.    Please advise    ND

## 2020-03-18 ENCOUNTER — TELEPHONE (OUTPATIENT)
Dept: CARDIOLOGY | Facility: CLINIC | Age: 85
End: 2020-03-18

## 2020-03-18 NOTE — TELEPHONE ENCOUNTER
----- Message from Toyin Cordova sent at 3/18/2020 12:02 PM CDT -----  Contact: [phone]'s Pati with [employer] [facility]  Pati with [employer]  called in returning your call. Please advise.

## 2020-03-18 NOTE — TELEPHONE ENCOUNTER
Pati from [facility] called in regards to the pt's BP.Dhe stated the pt took her Bp this morning it was 72/40. HR 68.  She also informed me the pt has decided to follow with Dr. Roberts's message from yesterday to take Norvasc every other day. Therefore she did not take the medication today.  She was feeling lightheaded then took her BP and is concerned it is too low.    I asked Pati if the pt only took her BP once and in one arm. She said she will call the pt and ask her to take her BP in both arms again and call me back to follow up while I get this message to Dr. Roberts.    ND

## 2020-03-18 NOTE — TELEPHONE ENCOUNTER
I called the pt to follow up and see how she is feeling.  The pt stated she is feeling much better and is fine now.     Pati stated the patients BP in the right arm was: 84/40 and in the left was: 84/45  Pulse: 68/72     I informed her I will let Dr. Roberts know of this update and I had spoke to the pt.    ND

## 2020-03-18 NOTE — TELEPHONE ENCOUNTER
I called Pati from home health and informed her Dr. Roberts advises to discontinue the pt's Norvasc.    ND

## 2020-03-24 RX ORDER — CARVEDILOL 6.25 MG/1
6.25 TABLET ORAL 2 TIMES DAILY
Qty: 180 TABLET | Refills: 3 | Status: CANCELLED | OUTPATIENT
Start: 2020-03-24

## 2020-03-24 NOTE — TELEPHONE ENCOUNTER
I called Pati from [facility] back in regards to this message.  She stated the pt's Rx Coreg is supposed to be 3.125 mg BID. However, the prescription keeps getting refilled to 6.25 mg BID.   Please send in a new prescription.  ND

## 2020-03-24 NOTE — TELEPHONE ENCOUNTER
----- Message from Deep Dobbins sent at 3/24/2020  9:38 AM CDT -----  Contact: [phone]/ [facility]  Pati requesting to speak with you regarding the patient medication and dosage.   Please call back to assist at [phone]

## 2020-03-25 RX ORDER — CARVEDILOL 3.12 MG/1
3.12 TABLET ORAL 2 TIMES DAILY WITH MEALS
Qty: 180 TABLET | Refills: 3 | Status: SHIPPED | OUTPATIENT
Start: 2020-03-25 | End: 2020-05-11 | Stop reason: SDUPTHER

## 2020-03-26 ENCOUNTER — OUTSIDE PLACE OF SERVICE (OUTPATIENT)
Dept: CARDIOLOGY | Facility: CLINIC | Age: 85
End: 2020-03-26
Payer: MEDICARE

## 2020-03-26 PROCEDURE — 93010 ELECTROCARDIOGRAM REPORT: CPT | Mod: ,,, | Performed by: INTERNAL MEDICINE

## 2020-03-26 PROCEDURE — 93010 PR ELECTROCARDIOGRAM REPORT: ICD-10-PCS | Mod: ,,, | Performed by: INTERNAL MEDICINE

## 2020-04-06 ENCOUNTER — TELEPHONE (OUTPATIENT)
Dept: HOME HEALTH SERVICES | Facility: HOSPITAL | Age: 85
End: 2020-04-06

## 2020-04-24 ENCOUNTER — DOCUMENT SCAN (OUTPATIENT)
Dept: HOME HEALTH SERVICES | Facility: HOSPITAL | Age: 85
End: 2020-04-24

## 2020-04-27 ENCOUNTER — TELEPHONE (OUTPATIENT)
Dept: CARDIOLOGY | Facility: CLINIC | Age: 85
End: 2020-04-27

## 2020-04-27 NOTE — TELEPHONE ENCOUNTER
----- Message from Paul Stoll sent at 4/27/2020 12:35 PM CDT -----  Contact: [facility]/[phone]  Hilda called to speak with your about unsigned orders for the patient that was given verbally.  She has faxed this request to your office and no response was received.    Please call [phone] to discuss.

## 2020-04-27 NOTE — TELEPHONE ENCOUNTER
I called Hilda back in regards to this message.   She stated she has sent the order forms to Dr. Roberts through the [facility].   I informed her Dr. Roberts has moved and I can get Dr. Schofieldsef to sign them tomorrow then fax them back.     She agreed to fax to our office: [phone] nd

## 2020-04-28 ENCOUNTER — TELEPHONE (OUTPATIENT)
Dept: CARDIOLOGY | Facility: CLINIC | Age: 85
End: 2020-04-28

## 2020-04-28 NOTE — TELEPHONE ENCOUNTER
----- Message from Paul Stoll sent at 4/28/2020 12:24 PM CDT -----  Contact: Hilda from [employer]/[phone]  Hilda called to check the status of the orders that were sent to your office on yesterday.    Please call [phone] to discuss today.

## 2020-04-28 NOTE — TELEPHONE ENCOUNTER
Reached out to Hilda perry/ [employer]     Advised that forms were faxed over to her office today, confirmed that they were received on her end

## 2020-04-30 ENCOUNTER — DOCUMENT SCAN (OUTPATIENT)
Dept: HOME HEALTH SERVICES | Facility: HOSPITAL | Age: 85
End: 2020-04-30
Payer: MEDICAID

## 2020-05-11 RX ORDER — CARVEDILOL 3.12 MG/1
3.12 TABLET ORAL 2 TIMES DAILY WITH MEALS
Qty: 180 TABLET | Refills: 3 | Status: SHIPPED | OUTPATIENT
Start: 2020-05-11 | End: 2020-07-15

## 2020-05-26 NOTE — PROGRESS NOTES
Bladder scan done on patient, greatest urine amount showed 113ml, will continue to monitor   Bi-Rhombic Flap Text: The defect edges were debeveled with a #15 scalpel blade.  Given the location of the defect and the proximity to free margins a bi-rhombic flap was deemed most appropriate.  Using a sterile surgical marker, an appropriate rhombic flap was drawn incorporating the defect. The area thus outlined was incised deep to adipose tissue with a #15 scalpel blade.  The skin margins were undermined to an appropriate distance in all directions utilizing iris scissors.

## 2020-07-24 ENCOUNTER — TELEPHONE (OUTPATIENT)
Dept: CARDIOLOGY | Facility: CLINIC | Age: 85
End: 2020-07-24

## 2020-07-24 NOTE — TELEPHONE ENCOUNTER
Called the pt back in regards to this message.  Scheduled a follow up at [facility] with Dr. Hackett for the pt to est care on Wednesday Sept 9, 2020 at 10 am.  Mailed pt appt reminder    ND

## 2020-07-24 NOTE — TELEPHONE ENCOUNTER
----- Message from Melanie Barron sent at 7/24/2020  1:51 PM CDT -----  Contact: Manny ( Granddaughter)-[phone]  Type:  Needs Medical Advice    Who Called: Tali ( Granddaughter  Reason for Call: regarding scheduling a appt for a Follow-up, pt was a pt of Dr Roberts  Would the patient rather a call back or a response via [facility]? Call back  Best Call Back Number: [phone]

## 2020-10-05 ENCOUNTER — OFFICE VISIT (OUTPATIENT)
Dept: CARDIOLOGY | Facility: CLINIC | Age: 85
End: 2020-10-05
Payer: MEDICARE

## 2020-10-05 VITALS
SYSTOLIC BLOOD PRESSURE: 160 MMHG | BODY MASS INDEX: 25.43 KG/M2 | HEIGHT: 65 IN | HEART RATE: 55 BPM | DIASTOLIC BLOOD PRESSURE: 77 MMHG | WEIGHT: 152.63 LBS | OXYGEN SATURATION: 98 %

## 2020-10-05 DIAGNOSIS — I35.1 MILD AORTIC REGURGITATION: ICD-10-CM

## 2020-10-05 DIAGNOSIS — I27.20 PULMONARY HYPERTENSION: ICD-10-CM

## 2020-10-05 DIAGNOSIS — N18.32 CHRONIC KIDNEY DISEASE (CKD) STAGE G3B/A1, MODERATELY DECREASED GLOMERULAR FILTRATION RATE (GFR) BETWEEN 30-44 ML/MIN/1.73 SQUARE METER AND ALBUMINURIA CREATININE RATIO LESS THAN 30 MG/G: ICD-10-CM

## 2020-10-05 DIAGNOSIS — I10 ESSENTIAL HYPERTENSION: ICD-10-CM

## 2020-10-05 DIAGNOSIS — I08.0 MILD MITRAL AND AORTIC REGURGITATION: ICD-10-CM

## 2020-10-05 DIAGNOSIS — R94.39 ABNORMAL STRESS TEST: ICD-10-CM

## 2020-10-05 DIAGNOSIS — I25.5 ISCHEMIC CARDIOMYOPATHY: Primary | ICD-10-CM

## 2020-10-05 DIAGNOSIS — I51.89 DIASTOLIC DYSFUNCTION: ICD-10-CM

## 2020-10-05 DIAGNOSIS — R93.1 DECREASED CARDIAC EJECTION FRACTION: ICD-10-CM

## 2020-10-05 PROCEDURE — 99214 PR OFFICE/OUTPT VISIT, EST, LEVL IV, 30-39 MIN: ICD-10-PCS | Mod: S$GLB,,, | Performed by: INTERNAL MEDICINE

## 2020-10-05 PROCEDURE — 99214 OFFICE O/P EST MOD 30 MIN: CPT | Mod: S$GLB,,, | Performed by: INTERNAL MEDICINE

## 2020-10-05 NOTE — PATIENT INSTRUCTIONS
Aerobic Exercise for a Healthy Heart  Exercise is a lot more than an energy booster and a stress reliever. It also strengthens your heart muscle, lowers your blood pressure and cholesterol, and burns calories. It can also improve your resting muscle tone, and your mood.     Remember, some activity is better than none.    Choose an aerobic activity  Choose an activity that makes your heart and lungs work harder than they do when you rest or walk normally. This aerobic exercise can improve the way your heart and other muscles use oxygen. Make it fun by exercising with a friend and choosing an activity you enjoy. Here are some ideas:  · Walking  · Swimming  · Bicycling  · Stair climbing  · Dancing  · Jogging  · Gardening  Exercise regularly  If you havent been exercising regularly,  get your doctors OK first. Then start slowly.  Here are some tips:  · Begin exercising 3 times a week for 5 to 10 minutes at a time.  · When you feel comfortable, add a few minutes each session.  · Slowly build up to exercising 3 to 4 times each week. Each session should last for 40 minutes, on average, and involve moderate- to vigorous-intensity physical activity.  · If you have been given nitroglycerin, be sure to carry it when you exercise.  · If you get chest pain (angina) when youre exercising, stop what youre doing, take your nitroglycerin, and call your doctor.  Date Last Reviewed: 6/2/2016  © 5377-1390 [employer]. [address] [place] [address]. All rights reserved. This information is not intended as a substitute for professional medical care. Always follow your healthcare professional's instructions.          Eating Heart-Healthy Foods  Eating has a big impact on your heart health. In fact, eating healthier can improve several of your heart risks at once. For instance, it helps you manage weight, cholesterol, and blood pressure. Here are ideas to help you make heart-healthy changes without giving up  all the foods and flavors you love.  Getting started  · Talk with your health care provider about eating plans, such as the DASH or Mediterranean diet. You may also be referred to a dietitian.  · Change a few things at a time. Give yourself time to get used to a few eating changes before adding more.  · Work to create a tasty, healthy eating plan that you can stick to for the rest of your life.    Goals for healthy eating  Below are some tips to improve your eating habits:  · Limit saturated fats and trans fats. Saturated fats raise your levels of cholesterol, so keep these fats to a minimum. They are found in foods such as fatty meats, whole milk, cheese, and palm and coconut oils. Avoid trans fats because they lower good cholesterol as well as raise bad cholesterol. Trans fats are most often found in processed foods.  · Reduce sodium (salt) intake. Eating too much salt may increase your blood pressure. Limit your sodium intake to 2,300 milligrams (mg) per day, or less if your health care provider recommends it. Dining out less often and eating fewer processed foods are two great ways to decrease the amount of salt you consume.  · Managing calories. A calorie is a unit of energy. Your body burns calories for fuel, but if you eat more calories than your body burns, the extras are stored as fat. Your health care provider can help you create a diet plan to manage your calories. This will likely include eating healthier foods as well as exercising regularly. To help you track your progress, keep a diary to record what you eat and how often you exercise.  Choose the right foods  Aim to make these foods staples of your diet. If you have diabetes, you may have different recommendations than what is listed here:  · Fruits and vegetable provide plenty of nutrients without a lot of calories. At meals, fill half your plate with these foods. Split the other half of your plate between whole grains and lean protein.  · Whole  grains are high in fiber and rich in vitamins and nutrients. Good choices include whole-wheat bread, pasta, and brown rice.  · Lean proteins give you nutrition with less fat. Good choices include fish, skinless chicken, and beans.  · Low-fat or nonfat dairy provides nutrients without a lot of fat. Try low-fat or nonfat milk, cheese, or yogurt.  · Healthy fats can be good for you in small amounts. These are unsaturated fats, such as olive oil, nuts, and fish. Try to have at least 2 servings per week of fatty fish such as salmon, sardines, mackerel, rainbow trout, and albacore tuna. These contain omega-3 fatty acids, which are good for your heart. Flaxseed is another source of a heart-healthy fat.  More on heart healthy eating    Read food labels  Healthy eating starts at the grocery store. Be sure to pay attention to food labels on packaged foods. Look for products that are high in fiber and protein, and low in saturated fat, cholesterol, and sodium. Avoid products that contain trans fat. And pay close attention to serving size. For instance, if you plan to eat two servings, double all the numbers on the label.  Prepare food right  A key part of healthy cooking is cutting down on added fat and salt. Look on the internet for lower-fat, lower-sodium recipes. Also, try these tips:  · Remove fat from meat and skin from poultry before cooking.  · Skim fat from the surface of soups and sauces.  · Broil, boil, bake, steam, grill, and microwave food without added fats.  · Choose ingredients that spice up your food without adding calories, fat, or sodium. Try these items: horseradish, hot sauce, lemon, mustard, nonfat salad dressings, and vinegar. For salt-free herbs and spices, try basil, cilantro, cinnamon, pepper, and rosemary.  Date Last Reviewed: 6/25/2015  © 3177-8198 [employer]. [address] [place] [address], [place] [address]. All rights reserved. This information is not intended as a substitute for  professional medical care. Always follow your healthcare professional's instructions.          Living with Cardiomyopathy  Your doctor will outline a treatment plan to help you live better with cardiomyopathy. This will stop your condition from getting worse and possibly causing serious problems for your heart and lungs. Be sure to follow your healthcare provider's instructions. You can also make some lifestyle changes that will help your heart.     Weigh yourself daily and write down your results.   Follow your treatment plan  Be sure to visit your healthcare provider regularly. Mention any problems you are having with your treatment plan. Be honest if you are not doing something your provider has suggested. He or she may be able to make some changes to help your plan work better for you. Not following your provider's advice could result in a serious or life-threatening complication. You would need to stay in the hospital.  Balance activity and rest  Having cardiomyopathy may mean you get tired more quickly because your heart doesn't work as well as it should. But this shouldnt keep you from being active. In fact, being active may help you feel better. Talk with your healthcare provider about how much activity is right for you.  Take steps to help your heart  · Stop smoking. Smoking damages your heart muscle and blood vessels. It t also causes changes to your lungs that can make it more difficult to breathe and for your lungs to work. Smoking reduces the oxygen in your blood. Having less oxygen in your blood will cause your heart to work harder and beat faster. This can cause a heart attack if your heart can't handle this extra work. This kind of heart attack is known as an acute myocardial infarction (AMI).  · Lose any excess weight. The more extra pounds you have, the harder your heart has to work to pump blood through your body. Extra weight can also raise your risk for high blood pressure and diabetes. These  diseases can further damage your blood vessels and heart.  · Don't drink alcohol. Drinking alcohol may make your cardiomyopathy worse. Alcohol breaks down the heart tissue. This affects how well your heart pumps. This can be very serious in people with alcoholism.  · Eat less salt. Salt is the main source of sodium in our diet. Too much sodium can make the symptoms of cardiomyopathy worse. Salt causes your body to retain water. This extra fluid makes your heart work harder. Your healthcare provider may tell you to limit how much sodium you have to to less than 1,500 mg a day. Thats about half a teaspoon of salt.  Keep track of your weight  Rapid weight gain may mean that you are retaining fluid. This is one of the signs of heart failure. Keeping track of your weight helps you notice this weight gain early and prevent further damage to your heart. To keep track of your weight:  · Weigh yourself at the same time each day, after you urinate. Wear the same thing each time. Write down your weight each day.  · Dont stop weighing yourself. If you forget one day, weigh again the next morning.  · Call your healthcare provider if you gain more than 2 pounds in 1 day, more than 5 pounds in 1 week, or whatever weight gain you were told to report by your provider.  Call your healthcare provider  Contact your healthcare provider if you:   · Faint or have dizzy spells  · Notice new symptoms from your medicine  · Have a new onset of coughing. This is especially true if the sputum is frothy or foamy.  · Have trouble breathing, especially if it occurs while at rest or lying down. Or if you have trouble breathing during exercise or even while walking short distances.  · Get tired faster  · Begin urinating less often  · Find that your feet or ankles swell more than usual. Or if you have swelling in your legs or abdomen, or if the veins in your neck stick out more than usual. You may notice it is harder to get your shoes or pants  because of swelling and bloating.  · Have tightness or pain in your chest, arm, jaw, or back  Date Last Reviewed: 2/1/2017  © 3568-7105 [employer]. [address] [place] [address], [place] [address]. All rights reserved. This information is not intended as a substitute for professional medical care. Always follow your healthcare professional's instructions.

## 2020-10-05 NOTE — PROGRESS NOTES
Subjective:   Patient ID:  Adilia Rosas is a 85 y.o. female who presents for follow-up of HTN,     HPI:   Adilia Rosas  has a past medical history of Anemia, Arthritis, CKD (chronic kidney disease) stage 4, GFR 15-29 ml/min, Edema, Elevated cholesterol, GERD (gastroesophageal reflux disease), History of DVT (deep vein thrombosis) (2016), Hyperkalemia, Hypertension, and Proteinuria.         10/5/2020 Patient used to follow up with Dr. Roberts and Dr. Weber   She is accompanied by her granddaughter. She has been doing very well since last visit. No chest pain, no palpitations. No Oliveira more than usual. She is compliant with medication and home health nurse checks on her once weekly. Had  Cardiomyopathy and abnormal stress test  For the last 2 years and the decision was made to treat medically. She has CKD as well. BP is elevated today. She stated that when home health checks it it is usually well controlled.     She is on Eliquis by IM for DVTs. plavix for abn stress in Jan 2019.    Historically, had cardiomyopathy, stress test with small ischemia burden the decision was made to treat medically:     Prior cardiac work up    Stress MPI 1/2019   1.  There is a perfusion defect in the inferior wall of the left ventricle. There is some reversibility in the apical aspect of the inferior wall of the left ventricle.  The reversibility is characteristic of ischemia.     2.  There is mild generalized hypokinesis.     3.  The left ventricular ejection fraction was calculated to be 48%.  The normal is greater than 54%.      Echo 11/2018   · The left ventricle cavity is moderately dilated.  · Left ventricle shows mild concentric hypertrophy.  · Left ventricle ejection fraction is moderately decreased at 35-40%  · Grade I (mild) left ventricular diastolic dysfunction consistent with impaired relaxation.  · LA pressure is normal.  · Local segmental wall motion abnormalities. The mid inferior wall and mid LVPW are  "akinetic  · Left atrium is moderately dilated.  · RV systolic function is normal.  · Right atrium is mildly dilated.  · Mild aortic regurgitation.  · Mild mitral regurgitation.  · Mild tricuspid regurgitation.  · Pulmonic valve shows mild regurgitation.  · Elevated central venous pressure (15 mm Hg).  · The estimated PA systolic pressure is 59.09 mm Hg  · Pulmonary hypertension present.  · No pericardial effusion.    Vitals:    10/05/20 1052   BP: (!) 160/77   Pulse: (!) 55   SpO2: 98%   Weight: 69.2 kg (152 lb 9.6 oz)   Height: 5' 4.5" (1.638 m)       Patient Active Problem List    Diagnosis Date Noted    Abnormal stress test 12/03/2019    Mild aortic regurgitation 02/15/2019    Mild mitral and aortic regurgitation 02/15/2019    Heart failure     Femoral neck fracture     Hyperkalemia     Pulmonary hypertension     Diastolic dysfunction     Decreased cardiac ejection fraction     Fall on same level from slipping, tripping and stumbling without subsequent striking against object, initial encounter 11/12/2018    Acute on chronic combined systolic and diastolic congestive heart failure, NYHA class 4 10/16/2018    Hypertensive urgency 10/16/2018    Cardiomyopathy 10/16/2018    Chronic kidney disease (CKD) stage G3b/A1, moderately decreased glomerular filtration rate (GFR) between 30-44 mL/min/1.73 square meter and albuminuria creatinine ratio less than 30 mg/g 02/07/2017    Fracture of femur 01/13/2017    Gastroesophageal reflux disease 01/10/2017    Arthritis 01/10/2017    H/O myocardial ischemia     Post op infection 01/06/2017    Hypomagnesemia 12/30/2016    Hypophosphatemia 12/30/2016    Anemia 12/30/2016    Essential hypertension 12/27/2016    Staphylococcus aureus infection     Postoperative infection 12/26/2016    Closed fracture of distal end of left femur 12/07/2016       Patient's Medications   New Prescriptions    No medications on file   Previous Medications    ACETAMINOPHEN " (TYLENOL) 500 MG TABLET    Take 500 mg by mouth every 6 (six) hours as needed for Pain.    ALBUTEROL SULFATE 2.5 MG/0.5 ML NEBU    Take 5 mg by nebulization every 6 (six) hours while awake.    ALENDRONATE-VITAMIN D3 (FOSAMAX PLUS D) 70 MG- 2,800 UNIT PER TABLET    Take 1 tablet by mouth every 7 days.    AMLODIPINE (NORVASC) 5 MG TABLET    Take 1 tablet (5 mg total) by mouth once daily.    APIXABAN (ELIQUIS) 5 MG TAB    Take 5 mg by mouth once daily.    ATORVASTATIN (LIPITOR) 20 MG TABLET    Take 1 tablet (20 mg total) by mouth once daily.    BENAZEPRIL (LOTENSIN) 20 MG TABLET    Take 1 tablet (20 mg total) by mouth once daily.    CARVEDILOL (COREG) 3.125 MG TABLET    TAKE 1 TABLET BY MOUTH TWICE DAILY    CIPROFLOXACIN HCL (CIPRO) 500 MG TABLET    Take 500 mg by mouth 2 (two) times daily.    CLOPIDOGREL (PLAVIX) 75 MG TABLET    Take 1 tablet (75 mg total) by mouth once daily.    DOCUSATE SODIUM (STOOL SOFTENER) 100 MG CAPSULE    Take 100 mg by mouth 2 (two) times daily.    FERROUS GLUCONATE 324 MG (37.5 MG IRON) TAB    Take 1 tablet (324 mg total) by mouth daily with breakfast.    FUROSEMIDE (LASIX) 40 MG TABLET    Take 1 tablet (40 mg total) by mouth once daily.    HYDROCODONE-ACETAMINOPHEN 5-325MG (NORCO) 5-325 MG PER TABLET    Take 2 tablets by mouth every 4 (four) hours as needed.    POTASSIUM CHLORIDE (MICRO-K) 10 MEQ CPSR       Modified Medications    No medications on file   Discontinued Medications    No medications on file         Review of Systems   Constitution: Negative for chills and fever.   HENT: Negative for hearing loss and nosebleeds.    Eyes: Negative for blurred vision.   Cardiovascular: Positive for dyspnea on exertion and leg swelling. Negative for chest pain and palpitations.   Respiratory: Negative for hemoptysis and shortness of breath.    Hematologic/Lymphatic: Negative for bleeding problem.   Skin: Negative for itching.   Musculoskeletal: Positive for arthritis. Negative for falls.    Gastrointestinal: Negative for abdominal pain and hematochezia.   Genitourinary: Negative for hematuria.   Neurological: Negative for dizziness and loss of balance.   Psychiatric/Behavioral: Negative for altered mental status and depression.   Left venous ulcer. Healing. Ongoing on and off for 10-15yrs      Objective:   Physical Exam   Constitutional: She is oriented to person, place, and time. She appears well-developed and well-nourished.   HENT:   Head: Normocephalic and atraumatic.   Eyes: Conjunctivae are normal.   Neck: Neck supple. No JVD present. Carotid bruit is not present.   Cardiovascular: Normal rate and regular rhythm. Exam reveals no gallop (grade II systolic ) and no friction rub.   Murmur heard.  Pulses:       Carotid pulses are 2+ on the right side and 2+ on the left side.       Radial pulses are 2+ on the right side and 2+ on the left side.   Pulmonary/Chest: Effort normal and breath sounds normal. No stridor. No respiratory distress. She has no wheezes.   Musculoskeletal:         General: Edema (1+) present.   Neurological: She is alert and oriented to person, place, and time.   Skin: Skin is warm and dry.   Psychiatric: She has a normal mood and affect. Her behavior is normal.       Lab Results    Lab Results   Component Value Date     11/19/2018    K 5.3 (H) 11/19/2018     11/19/2018    CO2 26 11/19/2018    BUN 34 (H) 11/19/2018    CREATININE 2.1 (H) 11/19/2018    GLU 95 11/19/2018    HGBA1C 4.8 11/12/2018    MG 1.7 11/19/2018    AST 30 11/19/2018    ALT 7 (L) 11/19/2018    ALBUMIN 2.3 (L) 11/19/2018    PROT 5.6 (L) 11/19/2018    BILITOT 0.5 11/19/2018    WBC 5.79 11/19/2018    HGB 8.6 (L) 11/19/2018    HCT 28.2 (L) 11/19/2018    MCV 96 11/19/2018     11/19/2018    INR 1.1 11/13/2018    INR 2.0 (H) 03/07/2007    TSH 0.983 11/12/2018    CHOL 129 11/29/2006    HDL 38.0 (L) 11/29/2006    LDLCALC 72.4 11/29/2006    TRIG 93 11/29/2006    CRP 28.0 (H) 03/10/2017     (H)  11/15/2018       Lipid panel  Lab Results   Component Value Date    CHOL 129 11/29/2006     Lab Results   Component Value Date    HDL 38.0 (L) 11/29/2006     Lab Results   Component Value Date    LDLCALC 72.4 11/29/2006     Lab Results   Component Value Date    TRIG 93 11/29/2006         Results for orders placed or performed during the hospital encounter of 09/19/16   2D echo with color flow doppler   Result Value Ref Range    QEF 55 55 - 65    Mitral Valve Regurgitation MILD     Diastolic Dysfunction No     Aortic Valve Regurgitation TRIVIAL     Est. PA Systolic Pressure 37.57     Tricuspid Valve Regurgitation TRIVIAL     Narrative    Date of Procedure: 09/19/2016        TEST DESCRIPTION   Technical Quality: This is a technically adequate study.     Aorta: The aortic root is normal in size, measuring 2.5 cm at sinotubular junction.     Left Atrium: The left atrial volume index is mildly enlarged, measuring 80.68 cc/m2.     Left Ventricle: The left ventricle is normal in size, with an end-diastolic diameter of 4.8 cm, and an end-systolic diameter of 3.6 cm. LV wall thickness is normal, with the septum and the posterior wall each measuring 0.9 cm across. Relative wall   thickness was normal at 0.38, and the LV mass index was 100.2 g/m2 consistent with normal left ventricular mass. Global left ventricular systolic function appears normal. Visually estimated ejection fraction is 55-60%. The LV Doppler derived stroke   volume equals 98.0 ccs.   The E/e'(lat) is 7, consistent with normal diastolic function.     Right Atrium: The right atrium is normal in size, measuring 6.2 cm in length in the apical view.     Right Ventricle: The right ventricle is normal in size measuring 2.5 cm at the base in the apical right ventricle-focused view. Global right ventricular systolic function appears normal. The estimated PA systolic pressure is 38 mmHg.     Aortic Valve:  The aortic valve is mildly sclerotic. Additionally, there is  trivial aortic regurgitation.     Mitral Valve:  The mitral valve is normal in structure. There is mild mitral regurgitation.     Tricuspid Valve:  There is trivial tricuspid regurgitation.     IVC: IVC is normal in size and collapses > 50% with a sniff, suggesting normal right atrial pressure of 3 mmHg.     Intracavitary: There is no evidence of pericardial effusion, intracavity mass, thrombi, or vegetation.         CONCLUSIONS     1 - Mild left atrial enlargement.     2 - Normal left ventricular systolic function (EF 55-60%).     3 - Normal right ventricular systolic function .     4 - The estimated PA systolic pressure is 38 mmHg.     5 - Trivial aortic regurgitation.     6 - Mild mitral regurgitation.     7 - Trivial tricuspid regurgitation.     8 - Grade 1 diastolic dysfunction .             This document has been electronically    SIGNED BY: Luke Hackett MD On: 09/19/2016 13:28    and Transthoracic echo (TTE) complete (Cupid Only):   Results for orders placed or performed during the hospital encounter of 11/12/18   Transthoracic echo (TTE) complete (Cupid Only)   Result Value Ref Range    LA WIDTH 4.91 cm    PV PEAK VELOCITY 1.06 cm/s    LVIDd 5.49 3.5 - 6.0 cm    IVS 1.20 (A) 0.6 - 1.1 cm    Posterior Wall 1.20 (A) 0.6 - 1.1 cm    Ao root annulus 3.04 cm    LVIDs 3.55 2.1 - 4.0 cm    FS 35 28 - 44 %    LA volume 113.86 cm3    LV mass 271.58 g    LA size 4.35 cm    RVDD 2.52 cm    Left Ventricle Relative Wall Thickness 0.44 cm    AV mean gradient 7.07 mmHg    AV valve area 2.00 cm2    E/A ratio 0.57     E wave decelartion time 277.30 msec    Pulm vein S/D ratio 1.55     LVOT diameter 2.05 cm    LVOT area 3.30 cm2    LVOT peak VTI 25.07 cm    Ao peak alejandra 1.92 m/s    Ao VTI 41.40 cm    LVOT stroke volume 82.70 cm3    AV peak gradient 14.75 mmHg    MV Peak E Alejandra 0.64 m/s    TR Max Alejandra 3.32 m/s    MV Peak A Alejandra 1.12 m/s    PV Peak S Alejandra 0.45 m/s    PV Peak D Alejandra 0.29 m/s    LV Systolic Volume 52.70 mL     LV Diastolic Volume 147.04 mL    RA Major Axis 5.65 cm    Left Atrium Minor Axis 6.13 cm    Left Atrium Major Axis 6.42 cm    Triscuspid Valve Regurgitation Peak Gradient 44.09 mmHg    BSA 1.86 m2    LV Systolic Volume Index 28.3 mL/m2    LV Diastolic Volume Index 79.05 mL/m2    LA Volume Index 61.2 mL/m2    LV Mass Index 146.0 g/m2    Right Atrial Pressure (from IVC) 15 mmHg    TV rest pulmonary artery pressure 59.09 mmHg    Narrative    · The left ventricle cavity is moderately dilated.  · Left ventricle shows mild concentric hypertrophy.  · Left ventricle ejection fraction is moderately decreased at 35-40%  · Grade I (mild) left ventricular diastolic dysfunction consistent with   impaired relaxation.  · LA pressure is normal.  · Local segmental wall motion abnormalities. The mid inferior wall and mid   LVPW are akinetic  · Left atrium is moderately dilated.  · RV systolic function is normal.  · Right atrium is mildly dilated.  · Mild aortic regurgitation.  · Mild mitral regurgitation.  · Mild tricuspid regurgitation.  · Pulmonic valve shows mild regurgitation.  · Elevated central venous pressure (15 mm Hg).  · The estimated PA systolic pressure is 59.09 mm Hg  · Pulmonary hypertension present.  · No pericardial effusion.        ECG:  normal EKG, normal sinus rhythm, unchanged from previous tracings, Q waves in inferior leads.    Cath study : n/a    Jan 2019    1.  There is a perfusion defect in the inferior wall of the left ventricle. There is some reversibility in the apical aspect of the inferior wall of the left ventricle.  The reversibility is characteristic of ischemia.    2.  There is mild generalized hypokinesis.    3.  The left ventricular ejection fraction was calculated to be 48%.  The normal is greater than 54%.      Assessment:     1. Ischemic cardiomyopathy    2. Essential hypertension    3. Pulmonary hypertension    4. Mild aortic regurgitation    5. Mild mitral and aortic regurgitation    6.  Decreased cardiac ejection fraction    7. Diastolic dysfunction    8. Abnormal stress test    9. Chronic kidney disease (CKD) stage G3b/A1, moderately decreased glomerular filtration rate (GFR) between 30-44 mL/min/1.73 square meter and albuminuria creatinine ratio less than 30 mg/g        Plan:   - The decision was made to treat medically  - Will continue GDMT BB/ACE  - Monitor BP at home, she had issues with hypotension in the past. Would avoid  Adjusting based on one reading in the office. Instructed to keep BP log and update us  - Currently she is stable, stable symptoms, hence would continue medically tx. Especially with her underlying sig CKD.   - Continue with plavix/statin   - c/w eliquis for hx of DVT     I spent 5-10 minutes asking, assessing, assisting, arranging and advising heart healthy diet improvements. This included low-salt meals, portion control and health food alternatives. I also encourage 30 minutes of moderate exercise 3-4x a week.       Continue with current medical plan and lifestyle changes.  Return sooner for concerns or questions. If symptoms persist go to the ED  Total duration of face to face visit time 30 minutes.  Total time spent counseling greater than fifty percent of total visit time.  Counseling included discussion regarding imaging findings, diagnosis, possibilities, treatment options, risks and benefits.      No orders of the defined types were placed in this encounter.      Follow up as scheduled. Return to clinic in 6 months.    .She expressed verbal understanding and agreed with the plan    Thank you for the opportunity to care for this patient. Will be available for questions if needed.

## 2021-04-17 NOTE — PLAN OF CARE
Problem: Fall Risk (Adult)  Goal: Absence of Falls  Patient will demonstrate the desired outcomes by discharge/transition of care.  Outcome: Ongoing (interventions implemented as appropriate)  Pt.understands that she is on strict fall precautions.Call light and personal items are placed within pt.s reach.Bed alarm activated and pt.s room is close to the nurses station.       0 = understands/communicates without difficulty

## 2021-05-10 ENCOUNTER — OFFICE VISIT (OUTPATIENT)
Dept: CARDIOLOGY | Facility: CLINIC | Age: 86
End: 2021-05-10
Payer: MEDICARE

## 2021-05-10 VITALS
SYSTOLIC BLOOD PRESSURE: 150 MMHG | DIASTOLIC BLOOD PRESSURE: 96 MMHG | BODY MASS INDEX: 27.26 KG/M2 | WEIGHT: 163.63 LBS | OXYGEN SATURATION: 98 % | HEART RATE: 66 BPM | HEIGHT: 65 IN

## 2021-05-10 DIAGNOSIS — I50.42 CHRONIC COMBINED SYSTOLIC AND DIASTOLIC CONGESTIVE HEART FAILURE, NYHA CLASS 4: ICD-10-CM

## 2021-05-10 DIAGNOSIS — R94.39 ABNORMAL STRESS TEST: ICD-10-CM

## 2021-05-10 DIAGNOSIS — R93.1 DECREASED CARDIAC EJECTION FRACTION: ICD-10-CM

## 2021-05-10 DIAGNOSIS — I10 ESSENTIAL HYPERTENSION: Primary | ICD-10-CM

## 2021-05-10 DIAGNOSIS — N18.32 CHRONIC KIDNEY DISEASE (CKD) STAGE G3B/A1, MODERATELY DECREASED GLOMERULAR FILTRATION RATE (GFR) BETWEEN 30-44 ML/MIN/1.73 SQUARE METER AND ALBUMINURIA CREATININE RATIO LESS THAN 30 MG/G: ICD-10-CM

## 2021-05-10 DIAGNOSIS — I25.5 ISCHEMIC CARDIOMYOPATHY: ICD-10-CM

## 2021-05-10 DIAGNOSIS — I35.1 MILD AORTIC REGURGITATION: ICD-10-CM

## 2021-05-10 DIAGNOSIS — I27.20 PULMONARY HYPERTENSION: ICD-10-CM

## 2021-05-10 DIAGNOSIS — I08.0 MILD MITRAL AND AORTIC REGURGITATION: ICD-10-CM

## 2021-05-10 PROCEDURE — 99214 PR OFFICE/OUTPT VISIT, EST, LEVL IV, 30-39 MIN: ICD-10-PCS | Mod: S$GLB,,, | Performed by: INTERNAL MEDICINE

## 2021-05-10 PROCEDURE — 99214 OFFICE O/P EST MOD 30 MIN: CPT | Mod: S$GLB,,, | Performed by: INTERNAL MEDICINE

## 2021-05-10 RX ORDER — CINACALCET 30 MG/1
TABLET, FILM COATED ORAL
COMMUNITY
Start: 2021-04-27

## 2021-05-10 RX ORDER — CARVEDILOL 3.12 MG/1
3.12 TABLET ORAL 2 TIMES DAILY
Qty: 180 TABLET | Refills: 2 | Status: SHIPPED | OUTPATIENT
Start: 2021-05-10

## 2021-05-10 RX ORDER — FUROSEMIDE 80 MG/1
80 TABLET ORAL 2 TIMES DAILY
Qty: 60 TABLET | Refills: 11 | Status: SHIPPED | OUTPATIENT
Start: 2021-05-10 | End: 2021-06-09

## 2021-06-15 NOTE — ASSESSMENT & PLAN NOTE
-BP stable this AM; noted reports of SBP down to 90s overnight with NS boluses given  -continue CCB, BB and ACEI for now  -monitor BP; if H&H continues to trend down and recurrent hypotension occurs then would hold antihypertensive regimen and recommend PRBC transfusion      Patient follows up with Dr Alexandra Feldman as an outpatient

## 2021-12-07 ENCOUNTER — OUTSIDE PLACE OF SERVICE (OUTPATIENT)
Dept: CARDIOLOGY | Facility: CLINIC | Age: 86
End: 2021-12-07
Payer: MEDICARE

## 2021-12-07 PROCEDURE — 93010 PR ELECTROCARDIOGRAM REPORT: ICD-10-PCS | Mod: ,,, | Performed by: INTERNAL MEDICINE

## 2021-12-07 PROCEDURE — 93010 ELECTROCARDIOGRAM REPORT: CPT | Mod: ,,, | Performed by: INTERNAL MEDICINE

## 2021-12-13 ENCOUNTER — HOSPITAL ENCOUNTER (OUTPATIENT)
Dept: CARDIOLOGY | Facility: HOSPITAL | Age: 86
Discharge: HOME | End: 2021-12-13
Attending: INTERNAL MEDICINE
Payer: MEDICARE

## 2021-12-13 VITALS — HEIGHT: 64 IN | WEIGHT: 163 LBS | BODY MASS INDEX: 27.83 KG/M2

## 2021-12-13 DIAGNOSIS — R93.1 DECREASED CARDIAC EJECTION FRACTION: ICD-10-CM

## 2021-12-13 LAB
AORTIC ROOT ANNULUS: 3.19 CM
AORTIC VALVE CUSP SEPERATION: 1.8 CM
AV INDEX (PROSTH): 0.76
AV MEAN GRADIENT: 5 MMHG
AV PEAK GRADIENT: 8 MMHG
AV VALVE AREA: 2.17 CM2
AV VELOCITY RATIO: 0.68
BSA FOR ECHO PROCEDURE: 1.83 M2
CV ECHO LV RWT: 0.57 CM
DOP CALC AO PEAK VEL: 1.45 M/S
DOP CALC AO VTI: 25.1 CM
DOP CALC LVOT AREA: 2.8 CM2
DOP CALC LVOT DIAMETER: 1.9 CM
DOP CALC LVOT PEAK VEL: 0.99 M/S
DOP CALC LVOT STROKE VOLUME: 54.41 CM3
DOP CALC MV VTI: 22.16 CM
DOP CALCLVOT PEAK VEL VTI: 19.2 CM
E WAVE DECELERATION TIME: 223.35 MSEC
E/A RATIO: 0.56
E/E' RATIO: 12.25 M/S
ECHO LV POSTERIOR WALL: 1.18 CM (ref 0.6–1.1)
EJECTION FRACTION: 45 %
FRACTIONAL SHORTENING: 31 % (ref 28–44)
INTERVENTRICULAR SEPTUM: 1.31 CM (ref 0.6–1.1)
IVC PROX: 0.8 CM
LA MAJOR: 4.55 CM
LA MINOR: 4.99 CM
LA WIDTH: 4.04 CM
LEFT ATRIUM SIZE: 3.68 CM
LEFT ATRIUM VOLUME INDEX MOD: 22.7 ML/M2
LEFT ATRIUM VOLUME INDEX: 33.6 ML/M2
LEFT ATRIUM VOLUME MOD: 40.67 CM3
LEFT ATRIUM VOLUME: 60.15 CM3
LEFT INTERNAL DIMENSION IN SYSTOLE: 2.85 CM (ref 2.1–4)
LEFT VENTRICLE DIASTOLIC VOLUME INDEX: 42.11 ML/M2
LEFT VENTRICLE DIASTOLIC VOLUME: 75.38 ML
LEFT VENTRICLE MASS INDEX: 102 G/M2
LEFT VENTRICLE SYSTOLIC VOLUME INDEX: 17.3 ML/M2
LEFT VENTRICLE SYSTOLIC VOLUME: 31 ML
LEFT VENTRICULAR INTERNAL DIMENSION IN DIASTOLE: 4.13 CM (ref 3.5–6)
LEFT VENTRICULAR MASS: 183.37 G
LV LATERAL E/E' RATIO: 12.25 M/S
LV SEPTAL E/E' RATIO: 12.25 M/S
MV A" WAVE DURATION": 10.38 MSEC
MV MEAN GRADIENT: 1 MMHG
MV PEAK A VEL: 0.87 M/S
MV PEAK E VEL: 0.49 M/S
MV PEAK GRADIENT: 3 MMHG
MV STENOSIS PRESSURE HALF TIME: 64.77 MS
MV VALVE AREA BY CONTINUITY EQUATION: 2.46 CM2
MV VALVE AREA P 1/2 METHOD: 3.4 CM2
PISA MRMAX VEL: 0.04 M/S
PISA TR MAX VEL: 2.12 M/S
PULM VEIN S/D RATIO: 2.38
PV PEAK D VEL: 0.24 M/S
PV PEAK S VEL: 0.57 M/S
PV PEAK VELOCITY: 1.15 CM/S
RA MAJOR: 4.57 CM
RA WIDTH: 3.46 CM
RIGHT VENTRICULAR END-DIASTOLIC DIMENSION: 2.51 CM
RV TISSUE DOPPLER FREE WALL SYSTOLIC VELOCITY 1 (APICAL 4 CHAMBER VIEW): 9.62 CM/S
TDI LATERAL: 0.04 M/S
TDI SEPTAL: 0.04 M/S
TDI: 0.04 M/S
TR MAX PG: 18 MMHG
TRICUSPID ANNULAR PLANE SYSTOLIC EXCURSION: 1.43 CM

## 2021-12-13 PROCEDURE — 93306 TTE W/DOPPLER COMPLETE: CPT | Mod: 26,,, | Performed by: INTERNAL MEDICINE

## 2021-12-13 PROCEDURE — 93306 TTE W/DOPPLER COMPLETE: CPT | Mod: PO

## 2021-12-13 PROCEDURE — 93306 ECHO (CUPID ONLY): ICD-10-PCS | Mod: 26,,, | Performed by: INTERNAL MEDICINE

## 2021-12-20 ENCOUNTER — TELEPHONE (OUTPATIENT)
Dept: CARDIOLOGY | Facility: CLINIC | Age: 86
End: 2021-12-20
Payer: MEDICAID

## 2022-01-24 DIAGNOSIS — I63.9 STROKE: Primary | ICD-10-CM

## 2022-10-14 ENCOUNTER — OUTSIDE PLACE OF SERVICE (OUTPATIENT)
Dept: CARDIOLOGY | Facility: CLINIC | Age: 87
End: 2022-10-14
Payer: MEDICARE

## 2022-10-14 PROCEDURE — 93010 PR ELECTROCARDIOGRAM REPORT: ICD-10-PCS | Mod: ,,, | Performed by: INTERNAL MEDICINE

## 2022-10-14 PROCEDURE — 93010 ELECTROCARDIOGRAM REPORT: CPT | Mod: ,,, | Performed by: INTERNAL MEDICINE

## 2023-05-01 ENCOUNTER — OUTSIDE PLACE OF SERVICE (OUTPATIENT)
Dept: CARDIOLOGY | Facility: CLINIC | Age: 88
End: 2023-05-01
Payer: MEDICARE

## 2023-05-01 PROCEDURE — 93010 ELECTROCARDIOGRAM REPORT: CPT | Mod: ,,, | Performed by: INTERNAL MEDICINE

## 2023-05-01 PROCEDURE — 93010 PR ELECTROCARDIOGRAM REPORT: ICD-10-PCS | Mod: ,,, | Performed by: INTERNAL MEDICINE

## (undated) DEVICE — SEE MEDLINE ITEM 146292

## (undated) DEVICE — SEE MEDLINE ITEM 152622

## (undated) DEVICE — GOWN SURGICAL XX LARGE X LONG

## (undated) DEVICE — SEE MEDLINE ITEM 156955

## (undated) DEVICE — SPONGE GAUZE 16PLY 4X4

## (undated) DEVICE — APPLICATOR CHLORAPREP ORN 26ML

## (undated) DEVICE — STOCKINET 4INX48

## (undated) DEVICE — ALCOHOL 70% ISOP W/GREEN 16OZ

## (undated) DEVICE — COVER OVERHEAD SURG LT BLUE

## (undated) DEVICE — PACK BASIC

## (undated) DEVICE — TAPE ADH MEDIPORE 4 X 10YDS

## (undated) DEVICE — PAD ABDOMINAL 5X9 STERILE

## (undated) DEVICE — SEE MEDLINE ITEM 157131

## (undated) DEVICE — SOL BETADINE 5%

## (undated) DEVICE — ELECTRODE REM PLYHSV RETURN 9

## (undated) DEVICE — GLOVE 8 PROTEXIS PI ORTHO

## (undated) DEVICE — SEE MEDLINE ITEM 152530

## (undated) DEVICE — DRAPE HIP TIBURON 87X115X134

## (undated) DEVICE — GLOVE BIOGEL ORTHOPEDIC 8

## (undated) DEVICE — PULSAVAC ZIMMER

## (undated) DEVICE — KIT BONE PREP

## (undated) DEVICE — STOCKINET TUBULAR 1 PLY 6X60IN

## (undated) DEVICE — SPONGE LAP 18X18 PREWASHED

## (undated) DEVICE — UNDERGLOVES BIOGEL PI SIZE 8

## (undated) DEVICE — DRESSING SPONGE 8PLY 4X4 STRL

## (undated) DEVICE — MANIFOLD 4 PORT

## (undated) DEVICE — DURAPREP SURG SCRUB 26ML

## (undated) DEVICE — CLOSURE SKIN STERI STRIP 1/4X4

## (undated) DEVICE — SEE MEDLINE ITEM 154981

## (undated) DEVICE — PADDING CAST SPECIALIST 6X4YD

## (undated) DEVICE — PADDING CAST 4IN

## (undated) DEVICE — TAPE SURG MEDIPORE 6X72IN

## (undated) DEVICE — SEE MEDLINE ITEM 146345

## (undated) DEVICE — DRAPE PLASTIC U 60X72

## (undated) DEVICE — PADDING CAST 6IN

## (undated) DEVICE — SEE MEDLINE ITEM 157125

## (undated) DEVICE — BLADE SURG CARBON STEEL #10

## (undated) DEVICE — PAD PREP 50/CA

## (undated) DEVICE — GAUZE SPONGE 4X4 12PLY

## (undated) DEVICE — DRESSING AQUACEL AG ADV 3.5X12

## (undated) DEVICE — DRAPE STERI U-SHAPED 47X51IN

## (undated) DEVICE — PAD CAST 6X4YD SPECIALISTIC

## (undated) DEVICE — TAPE CASTING 5 X 4YDS WHT

## (undated) DEVICE — DRAPE STERI-DRAPE 83X125 IOBAN

## (undated) DEVICE — KIT MX BNE CEM REVOLTN W/BRKWY

## (undated) DEVICE — DRESSING XEROFORM FOIL PK 1X8

## (undated) DEVICE — DRESSING XEROFORM GAUZE 5X9

## (undated) DEVICE — GLOVE 8 PROTEXIS PI BLUE

## (undated) DEVICE — SUT MONOCRYL 3-0 PS-2 UND

## (undated) DEVICE — SUPPORT ULNA NERVE PROTECTOR

## (undated) DEVICE — DRESSING XEROFORM 1X8IN

## (undated) DEVICE — TAPE CASTING 4 X 4YDS WHT

## (undated) DEVICE — DRAPE STERI-DRAPE 1000 17X11IN

## (undated) DEVICE — SEE MEDLINE ITEM 157216

## (undated) DEVICE — SEE MEDLINE ITEM 157117

## (undated) DEVICE — SEE MEDLINE ITEM 157116